# Patient Record
Sex: FEMALE | Race: WHITE | NOT HISPANIC OR LATINO | Employment: OTHER | URBAN - NONMETROPOLITAN AREA
[De-identification: names, ages, dates, MRNs, and addresses within clinical notes are randomized per-mention and may not be internally consistent; named-entity substitution may affect disease eponyms.]

---

## 2018-07-12 ENCOUNTER — OFFICE VISIT (OUTPATIENT)
Dept: CARDIOLOGY | Facility: CLINIC | Age: 73
End: 2018-07-12

## 2018-07-12 VITALS
OXYGEN SATURATION: 98 % | BODY MASS INDEX: 31.73 KG/M2 | DIASTOLIC BLOOD PRESSURE: 80 MMHG | HEART RATE: 86 BPM | WEIGHT: 172.4 LBS | SYSTOLIC BLOOD PRESSURE: 146 MMHG | HEIGHT: 62 IN

## 2018-07-12 DIAGNOSIS — B36.9 FUNGAL RASH OF TORSO: ICD-10-CM

## 2018-07-12 DIAGNOSIS — E78.2 MIXED HYPERLIPIDEMIA: ICD-10-CM

## 2018-07-12 DIAGNOSIS — R07.2 PRECORDIAL PAIN: ICD-10-CM

## 2018-07-12 DIAGNOSIS — R06.02 SHORTNESS OF BREATH: ICD-10-CM

## 2018-07-12 DIAGNOSIS — I10 ESSENTIAL HYPERTENSION: ICD-10-CM

## 2018-07-12 DIAGNOSIS — I25.119 CORONARY ARTERY DISEASE INVOLVING NATIVE CORONARY ARTERY OF NATIVE HEART WITH ANGINA PECTORIS (HCC): Primary | ICD-10-CM

## 2018-07-12 PROCEDURE — 93000 ELECTROCARDIOGRAM COMPLETE: CPT | Performed by: INTERNAL MEDICINE

## 2018-07-12 PROCEDURE — 99204 OFFICE O/P NEW MOD 45 MIN: CPT | Performed by: INTERNAL MEDICINE

## 2018-07-12 RX ORDER — ROSUVASTATIN CALCIUM 5 MG/1
5 TABLET, COATED ORAL EVERY OTHER DAY
COMMUNITY
End: 2018-07-12 | Stop reason: DRUGHIGH

## 2018-07-12 RX ORDER — AZITHROMYCIN 250 MG/1
TABLET, FILM COATED ORAL
Qty: 6 TABLET | Refills: 0 | Status: SHIPPED | OUTPATIENT
Start: 2018-07-12 | End: 2018-08-09

## 2018-07-12 RX ORDER — ROSUVASTATIN CALCIUM 20 MG/1
20 TABLET, COATED ORAL DAILY
Qty: 30 TABLET | Refills: 11 | Status: SHIPPED | OUTPATIENT
Start: 2018-07-12 | End: 2020-01-02 | Stop reason: SDUPTHER

## 2018-07-12 RX ORDER — AMLODIPINE BESYLATE 5 MG/1
5 TABLET ORAL DAILY
Qty: 30 TABLET | Refills: 11 | Status: SHIPPED | OUTPATIENT
Start: 2018-07-12 | End: 2019-05-21 | Stop reason: SDUPTHER

## 2018-07-12 RX ORDER — NYSTATIN 100000 [USP'U]/G
POWDER TOPICAL 4 TIMES DAILY
Qty: 1 EACH | Refills: 6 | Status: SHIPPED | OUTPATIENT
Start: 2018-07-12 | End: 2018-08-09

## 2018-07-12 RX ORDER — NITROGLYCERIN 0.4 MG/1
0.4 TABLET SUBLINGUAL
COMMUNITY
End: 2018-07-12 | Stop reason: SDUPTHER

## 2018-07-12 RX ORDER — LISINOPRIL 20 MG/1
20 TABLET ORAL DAILY
COMMUNITY
End: 2018-10-19 | Stop reason: SDUPTHER

## 2018-07-12 RX ORDER — NITROGLYCERIN 0.4 MG/1
0.4 TABLET SUBLINGUAL
Qty: 25 TABLET | Refills: 6 | Status: SHIPPED | OUTPATIENT
Start: 2018-07-12

## 2018-07-12 RX ORDER — DIMENHYDRINATE 50 MG
1 TABLET ORAL 2 TIMES DAILY
Qty: 60 CAPSULE | Refills: 0 | Status: SHIPPED | OUTPATIENT
Start: 2018-07-12 | End: 2021-01-08 | Stop reason: SDUPTHER

## 2018-07-12 RX ORDER — CLOPIDOGREL BISULFATE 75 MG/1
75 TABLET ORAL DAILY
COMMUNITY
Start: 2014-01-21 | End: 2019-02-25 | Stop reason: SDUPTHER

## 2018-07-12 NOTE — PATIENT INSTRUCTIONS
Steps to Quit Smoking  Smoking tobacco can be bad for your health. It can also affect almost every organ in your body. Smoking puts you and people around you at risk for many serious long-lasting (chronic) diseases. Quitting smoking is hard, but it is one of the best things that you can do for your health. It is never too late to quit.  What are the benefits of quitting smoking?  When you quit smoking, you lower your risk for getting serious diseases and conditions. They can include:  · Lung cancer or lung disease.  · Heart disease.  · Stroke.  · Heart attack.  · Not being able to have children (infertility).  · Weak bones (osteoporosis) and broken bones (fractures).    If you have coughing, wheezing, and shortness of breath, those symptoms may get better when you quit. You may also get sick less often. If you are pregnant, quitting smoking can help to lower your chances of having a baby of low birth weight.  What can I do to help me quit smoking?  Talk with your doctor about what can help you quit smoking. Some things you can do (strategies) include:  · Quitting smoking totally, instead of slowly cutting back how much you smoke over a period of time.  · Going to in-person counseling. You are more likely to quit if you go to many counseling sessions.  · Using resources and support systems, such as:  ? Online chats with a counselor.  ? Phone quitlines.  ? Printed self-help materials.  ? Support groups or group counseling.  ? Text messaging programs.  ? Mobile phone apps or applications.  · Taking medicines. Some of these medicines may have nicotine in them. If you are pregnant or breastfeeding, do not take any medicines to quit smoking unless your doctor says it is okay. Talk with your doctor about counseling or other things that can help you.    Talk with your doctor about using more than one strategy at the same time, such as taking medicines while you are also going to in-person counseling. This can help make  quitting easier.  What things can I do to make it easier to quit?  Quitting smoking might feel very hard at first, but there is a lot that you can do to make it easier. Take these steps:  · Talk to your family and friends. Ask them to support and encourage you.  · Call phone quitlines, reach out to support groups, or work with a counselor.  · Ask people who smoke to not smoke around you.  · Avoid places that make you want (trigger) to smoke, such as:  ? Bars.  ? Parties.  ? Smoke-break areas at work.  · Spend time with people who do not smoke.  · Lower the stress in your life. Stress can make you want to smoke. Try these things to help your stress:  ? Getting regular exercise.  ? Deep-breathing exercises.  ? Yoga.  ? Meditating.  ? Doing a body scan. To do this, close your eyes, focus on one area of your body at a time from head to toe, and notice which parts of your body are tense. Try to relax the muscles in those areas.  · Download or buy apps on your mobile phone or tablet that can help you stick to your quit plan. There are many free apps, such as QuitGuide from the CDC (Centers for Disease Control and Prevention). You can find more support from smokefree.gov and other websites.    This information is not intended to replace advice given to you by your health care provider. Make sure you discuss any questions you have with your health care provider.  Document Released: 10/14/2010 Document Revised: 08/15/2017 Document Reviewed: 05/03/2016  [employer] Interactive Patient Education © 2018 [employer]  Fat and Cholesterol Restricted Diet  Getting too much fat and cholesterol in your diet may cause health problems. Following this diet helps keep your fat and cholesterol at normal levels. This can keep you from getting sick.  What types of fat should I choose?  · Choose monosaturated and polyunsaturated fats. These are found in foods such as olive oil, canola oil, flaxseeds, walnuts, almonds, and seeds.  · Eat more  "omega-3 fats. Good choices include salmon, mackerel, sardines, tuna, flaxseed oil, and ground flaxseeds.  · Limit saturated fats. These are in animal products such as meats, butter, and cream. They can also be in plant products such as palm oil, palm kernel oil, and coconut oil.  · Avoid foods with partially hydrogenated oils in them. These contain trans fats. Examples of foods that have trans fats are stick margarine, some tub margarines, cookies, crackers, and other baked goods.  What general guidelines do I need to follow?  · Check food labels. Look for the words \"trans fat\" and \"saturated fat.\"  · When preparing a meal:  ? Fill half of your plate with vegetables and green salads.  ? Fill one fourth of your plate with whole grains. Look for the word \"whole\" as the first word in the ingredient list.  ? Fill one fourth of your plate with lean protein foods.  · Eat more foods that have fiber, like apples, carrots, beans, peas, and barley.  · Eat more home-cooked foods. Eat less at restaurants and buffets.  · Limit or avoid alcohol.  · Limit foods high in starch and sugar.  · Limit fried foods.  · Cook foods without frying them. Baking, boiling, grilling, and broiling are all great options.  · Lose weight if you are overweight. Losing even a small amount of weight can help your overall health. It can also help prevent diseases such as diabetes and heart disease.  What foods can I eat?  Grains  Whole grains, such as whole wheat or whole grain breads, crackers, cereals, and pasta. Unsweetened oatmeal, bulgur, barley, quinoa, or brown rice. Corn or whole wheat flour tortillas.  Vegetables  Fresh or frozen vegetables (raw, steamed, roasted, or grilled). Green salads.  Fruits  All fresh, canned (in natural juice), or frozen fruits.  Meat and Other Protein Products  Ground beef (85% or leaner), grass-fed beef, or beef trimmed of fat. Skinless chicken or turkey. Ground chicken or turkey. Pork trimmed of fat. All fish and " seafood. Eggs. Dried beans, peas, or lentils. Unsalted nuts or seeds. Unsalted canned or dry beans.  Dairy  Low-fat dairy products, such as skim or 1% milk, 2% or reduced-fat cheeses, low-fat ricotta or cottage cheese, or plain low-fat yogurt.  Fats and Oils  Tub margarines without trans fats. Light or reduced-fat mayonnaise and salad dressings. Avocado. Olive, canola, sesame, or safflower oils. Natural peanut or almond butter (choose ones without added sugar and oil).  The items listed above may not be a complete list of recommended foods or beverages. Contact your dietitian for more options.  What foods are not recommended?  Grains  White bread. White pasta. White rice. Cornbread. Bagels, pastries, and croissants. Crackers that contain trans fat.  Vegetables  White potatoes. Corn. Creamed or fried vegetables. Vegetables in a cheese sauce.  Fruits  Dried fruits. Canned fruit in light or heavy syrup. Fruit juice.  Meat and Other Protein Products  Fatty cuts of meat. Ribs, chicken wings, hartmann, sausage, bologna, salami, chitterlings, fatback, hot dogs, bratwurst, and packaged luncheon meats. Liver and organ meats.  Dairy  Whole or 2% milk, cream, half-and-half, and cream cheese. Whole milk cheeses. Whole-fat or sweetened yogurt. Full-fat cheeses. Nondairy creamers and whipped toppings. Processed cheese, cheese spreads, or cheese curds.  Sweets and Desserts  Corn syrup, sugars, honey, and molasses. Candy. Jam and jelly. Syrup. Sweetened cereals. Cookies, pies, cakes, donuts, muffins, and ice cream.  Fats and Oils  Butter, stick margarine, lard, shortening, ghee, or hartmann fat. Coconut, palm kernel, or palm oils.  Beverages  Alcohol. Sweetened drinks (such as sodas, lemonade, and fruit drinks or punches).  The items listed above may not be a complete list of foods and beverages to avoid. Contact your dietitian for more information.  This information is not intended to replace advice given to you by your health care  provider. Make sure you discuss any questions you have with your health care provider.  Document Released: 06/18/2013 Document Revised: 08/24/2017 Document Reviewed: 03/19/2015  [employer] Interactive Patient Education © 2018 [employer]  BMI for Adults  Body mass index (BMI) is a number that is calculated from a person's weight and height. In most adults, the number is used to find how much of an adult's weight is made up of fat. BMI is not as accurate as a direct measure of body fat.  How is BMI calculated?  BMI is calculated by dividing weight in kilograms by height in meters squared. It can also be calculated by dividing weight in pounds by height in inches squared, then multiplying the resulting number by 703. Charts are available to help you find your BMI quickly and easily without doing this calculation.  How is BMI interpreted?  Health care professionals use BMI charts to identify whether an adult is underweight, at a normal weight, or overweight based on the following guidelines:  · Underweight: BMI less than 18.5.  · Normal weight: BMI between 18.5 and 24.9.  · Overweight: BMI between 25 and 29.9.  · Obese: BMI of 30 and above.    BMI is usually interpreted the same for males and females.  Weight includes both fat and muscle, so someone with a muscular build, such as an athlete, may have a BMI that is higher than 24.9. In cases like these, BMI may not accurately depict body fat. To determine if excess body fat is the cause of a BMI of 25 or higher, further assessments may need to be done by a health care provider.  Why is BMI a useful tool?  BMI is used to identify a possible weight problem that may be related to a medical problem or may increase the risk for medical problems. BMI can also be used to promote changes to reach a healthy weight.  This information is not intended to replace advice given to you by your health care provider. Make sure you discuss any questions you have with your health care  provider.  Document Released: 08/29/2005 Document Revised: 04/27/2017 Document Reviewed: 05/15/2015  [employer] Interactive Patient Education © 2018 [employer]      Tonic Water 6 ounces daily

## 2018-07-12 NOTE — PROGRESS NOTES
"Tori Olvera is a 72 y.o. female     Chief Complaint   Patient presents with   • Establish Care     Presents to establish cardiac care.    • Coronary Artery Disease   • Shortness of Breath   • Hyperlipidemia   • Chest Pain       PROBLEM LIST:     Specialty Problems     None          1. CAD.  1.1 Cardiac Cath. \"Failed\" PCI of the RCA. [facility]. Winter 2014- Intraprocedural closure of vessel. ILUMIEN-1 stent mid-LAD, 01/20/2014. [place], [place].  2. Chest Pain.  3. Shortness of Breath.  4. Hyperlipidemia.     HPI:    Ms. Cassy Olvera is a 72-year-old white female referred by Dr. Joseph for evaluation of recurrent chest pain and progressive dyspnea.    Ms. Jackson has known atherosclerotic coronary artery disease and underwent stenting of the right coronary artery and a proximally 2014.  Apparently she had moderate disease in the LAD at that time which was not stented after FFR evaluation.    Since that time the patient has had mildly progressive exertional dyspnea until approximately 2-3 months ago when she began to notice recurrent chest discomfort.  She describes a deep retrosternal and left precordial pain radiates toward the back which occurs when she walks less than 100 yards.  She has a sense of increased shortness of breath when she has discomfort.  She never has rest chest pain, and her discomfort is not pleuritic.  It is not been progressive since that time.  However, Ms. Olvera is severely limited by exertional dyspnea.  Class III levels of activity produces clinically significant shortness of breath.  She has no orthopnea or PND.  She has severe nocturnal leg cramps but no claudication.  She describes no palpitations, dizziness, presyncope or syncope.  She describes no symptoms of arterial embolic events in particular, she has no symptoms of TIA or stroke.    CURRENT MEDICATION:    Current Outpatient Prescriptions   Medication Sig Dispense Refill   • clopidogrel (PLAVIX) 75 MG " tablet Take  by mouth Daily.     • lisinopril (PRINIVIL,ZESTRIL) 20 MG tablet Take 20 mg by mouth Daily.     • nitroglycerin (NITROSTAT) 0.4 MG SL tablet Place 1 tablet under the tongue Every 5 (Five) Minutes As Needed for Chest Pain. Take no more than 3 doses in 15 minutes. 25 tablet 6   • aclidinium bromide (TUDORZA PRESSAIR) 400 MCG/ACT aerosol powder  powder for inhalation Inhale 1 puff 2 (Two) Times a Day. 1 each 11   • amLODIPine (NORVASC) 5 MG tablet Take 1 tablet by mouth Daily. 30 tablet 11   • azithromycin (ZITHROMAX Z-LIZZIE) 250 MG tablet Take 2 tablets the first day, then 1 tablet daily for 4 days. 6 tablet 0   • Coenzyme Q10 (CO Q-10) 100 MG capsule Take 100 mg by mouth 2 (Two) Times a Day. 60 capsule 0   • Fluticasone Furoate-Vilanterol (BREO ELLIPTA) 100-25 MCG/INH aerosol powder  Inhale 1 puff Daily. 1 each 11   • nystatin (MYCOSTATIN) 494561 UNIT/GM powder Apply  topically 4 (Four) Times a Day. 1 each 6   • rosuvastatin (CRESTOR) 20 MG tablet Take 1 tablet by mouth Daily. 30 tablet 11     No current facility-administered medications for this visit.        ALLERGIES:    Patient has no known allergies.    PAST MEDICAL HISTORY:    Past Medical History:   Diagnosis Date   • Coronary artery disease    • Hyperlipidemia    • Hypertension        SURGICAL HISTORY:    Past Surgical History:   Procedure Laterality Date   • CARDIAC CATHETERIZATION     • COLONOSCOPY W/ BIOPSIES     • CORONARY STENT PLACEMENT  01/20/2014    Stent x1 01/20/14   • CORONARY STENT PLACEMENT  2014    Failed PCI to RCA       SOCIAL HISTORY:    Social History     Social History   • Marital status:      Spouse name: N/A   • Number of children: N/A   • Years of education: N/A     Occupational History   • Not on file.     Social History Main Topics   • Smoking status: Current Every Day Smoker     Packs/day: 1.50     Years: 50.00     Types: Cigarettes   • Smokeless tobacco: Never Used   • Alcohol use No   • Drug use: No   • Sexual  "activity: Defer     Other Topics Concern   • Not on file     Social History Narrative   • No narrative on file       FAMILY HISTORY:    Family History   Problem Relation Age of Onset   • Hypertension Mother    • Heart disease Mother    • Diabetes Mother    • Heart attack Father        Review of Systems   Constitutional: Positive for fatigue. Negative for chills, diaphoresis and fever.   HENT: Positive for sore throat.    Eyes: Positive for visual disturbance (Wears reading glasses).   Respiratory: Positive for shortness of breath (With minimal exertion).    Cardiovascular: Positive for chest pain (Intermittent with activity. \"pressure\". Midsternal, non-radiating. ). Negative for palpitations and leg swelling.   Gastrointestinal: Positive for constipation (Chronic). Negative for abdominal pain, blood in stool and diarrhea.   Endocrine: Negative.    Genitourinary: Negative.  Negative for difficulty urinating and hematuria.   Musculoskeletal: Positive for myalgias (To BLE). Negative for arthralgias.   Skin: Negative.    Allergic/Immunologic: Negative.    Neurological: Negative for dizziness, syncope, weakness, light-headedness and headaches.   Hematological: Bruises/bleeds easily (Both).   Psychiatric/Behavioral: Negative for sleep disturbance (Denies waking at night SOA. ).       VISIT VITALS:  Vitals:    07/12/18 1052   BP: 146/80   BP Location: Left arm   Patient Position: Sitting   Pulse: 86   SpO2: 98%   Weight: 78.2 kg (172 lb 6.4 oz)   Height: 157.5 cm (62\")      /80 (BP Location: Left arm, Patient Position: Sitting)   Pulse 86   Ht 157.5 cm (62\")   Wt 78.2 kg (172 lb 6.4 oz)   SpO2 98%   BMI 31.53 kg/m²     RECENT LABS:    Objective       Physical Exam   Constitutional: She is oriented to person, place, and time. She appears well-developed and well-nourished. She is cooperative.   HENT:   Head: Normocephalic and atraumatic.   Mouth/Throat: Uvula is midline and mucous membranes are normal. No oral " lesions.   Tonsillar and Palate redness noted   Eyes: Conjunctivae, EOM and lids are normal. Pupils are equal, round, and reactive to light.   Negative Lid Lag  Negative Ptosis   Neck: Normal range of motion and full passive range of motion without pain. Neck supple. Normal carotid pulses, no hepatojugular reflux and no JVD present. Carotid bruit is not present. No thyromegaly present.   Cardiovascular: Normal rate and regular rhythm.  Exam reveals gallop and S4 (Soft). Exam reveals no S3 and no friction rub.    No murmur heard.  Pulses:       Carotid pulses are 2+ on the right side, and 2+ on the left side.       Radial pulses are 2+ on the right side, and 2+ on the left side.        Dorsalis pedis pulses are 2+ on the left side. Right dorsalis pedis pulse not accessible.   Distant S1 and S2   Pulmonary/Chest: Effort normal. No respiratory distress. She has decreased breath sounds (Mildly decreased breath sounds throughout). She has wheezes in the left upper field. She has no rhonchi. She has no rales.   Abdominal: Soft. Normal appearance and bowel sounds are normal. She exhibits no distension, no abdominal bruit and no mass. There is no hepatomegaly. There is no tenderness. There is no rebound and no guarding.   Musculoskeletal: Normal range of motion. She exhibits edema (Trace edema to BLE. ).   Neurological: She is alert and oriented to person, place, and time.   Skin: Skin is warm, dry and intact. Rash (Fungal rash noted to skin fold of abdomen) noted.   Cap refill >6 seconds to RLE   Psychiatric: She has a normal mood and affect. Her behavior is normal. Judgment and thought content normal.   Nursing note and vitals reviewed.        ECG 12 Lead  Date/Time: 7/12/2018 11:09 AM  Performed by: DORYS SHAFFER  Authorized by: DORYS SHAFFER   Comments: Indications: CP. SOA. CAD.               Assessment/Plan      #1.  Recurrent low level angina in a patient with known coronary artery disease.  Risk  stratification needs to be pursued on an expedited basis.    #2.  Therefore, we will perform pharmacologic stress testing as an ischemia assessment, and echocardiography to assess LV systolic and diastolic performance, LV filling pressures, and pulmonary pressures given the patient's profound dyspnea.    #3.  As empiric therapy for ischemia we will start amlodipine 5 mg a day and avoid beta blocker at least temporarily because the patient's chronic lung disease.  We we'll also give the patient prescription for sublingual nitroglycerin and she was given instructions in its use.    #4.  The patient would benefit from high-dose statin therapy.  Therefore, we will increase rosuvastatin to 20 mg daily and start the patient on coenzyme Q10 100 mg twice daily and tonic water 6 ounces to mitigate arthralgias and myalgias.    #5.  Ms. Olvera describes purulent sputum which did not improve with a penicillin derivative prescribed by Dr. Joseph.  As she has marked pharyngeal injection today we will empirically start a Z-Kaiden and Ms. Olvera will follow-up with Dr. Joseph she does not have improvement in symptoms in several days.    #6.  I would like to see how much of the patient's exertional dyspnea is related to chronic lung disease.  Therefore we will start 2 doors and debris oh inhalers and the patient will continue to use her rescue albuterol.    #7.  Ms. Olvera will follow-up with Dr. Joseph as instructed, and in our office after testing, on a when necessary basis for symptoms, rest discussed for medication intolerance.  The patient understands to report to the emergency room are activated emergency medical services for any chest pain not rapidly relieved by rest and/or nitroglycerin.     Diagnosis Plan   1. Coronary artery disease involving native coronary artery of native heart with angina pectoris (CMS/[facility])  ECG 12 Lead    Adult Transthoracic Echo Complete W/ Cont if Necessary Per Protocol    Stress Test With  Myocardial Perfusion One Day    rosuvastatin (CRESTOR) 20 MG tablet    amLODIPine (NORVASC) 5 MG tablet    nitroglycerin (NITROSTAT) 0.4 MG SL tablet    Adult Transthoracic Echo Complete W/ Cont if Necessary Per Protocol    Stress Test With Myocardial Perfusion One Day   2. Precordial pain  ECG 12 Lead    Adult Transthoracic Echo Complete W/ Cont if Necessary Per Protocol    Stress Test With Myocardial Perfusion One Day    rosuvastatin (CRESTOR) 20 MG tablet    amLODIPine (NORVASC) 5 MG tablet    nitroglycerin (NITROSTAT) 0.4 MG SL tablet    Adult Transthoracic Echo Complete W/ Cont if Necessary Per Protocol    Stress Test With Myocardial Perfusion One Day   3. Shortness of breath  ECG 12 Lead    Adult Transthoracic Echo Complete W/ Cont if Necessary Per Protocol    Stress Test With Myocardial Perfusion One Day    rosuvastatin (CRESTOR) 20 MG tablet    amLODIPine (NORVASC) 5 MG tablet    aclidinium bromide (TUDORZA PRESSAIR) 400 MCG/ACT aerosol powder  powder for inhalation    Fluticasone Furoate-Vilanterol (BREO ELLIPTA) 100-25 MCG/INH aerosol powder     azithromycin (ZITHROMAX Z-LIZZIE) 250 MG tablet    nitroglycerin (NITROSTAT) 0.4 MG SL tablet    Adult Transthoracic Echo Complete W/ Cont if Necessary Per Protocol    Stress Test With Myocardial Perfusion One Day   4. Mixed hyperlipidemia  Adult Transthoracic Echo Complete W/ Cont if Necessary Per Protocol    Stress Test With Myocardial Perfusion One Day    rosuvastatin (CRESTOR) 20 MG tablet    amLODIPine (NORVASC) 5 MG tablet    Coenzyme Q10 (CO Q-10) 100 MG capsule    Adult Transthoracic Echo Complete W/ Cont if Necessary Per Protocol    Stress Test With Myocardial Perfusion One Day   5. Essential hypertension  amLODIPine (NORVASC) 5 MG tablet   6. Fungal rash of torso  nystatin (MYCOSTATIN) 438360 UNIT/GM powder       Return in about 6 weeks (around 8/23/2018).         I advised Cassy of the risks of continuing to use tobacco, and I provided her with tobacco  cessation educational materials in the After Visit Summary.     During this visit, I spent less than 3 minutes counseling the patient regarding tobacco cessation.      Shiraz Byrne MD        Electronically signed by:        Scribed for Dr. Shiraz Byrne by Pita Fortune LPN July 17, 2018 9:18 AM     This note is dictated utilizing voice recognition software.  Although this record has been proof read, transcriptional errors may still be present. If questions occur regarding the content of this record please do not hesitate to call our office.

## 2018-07-17 ENCOUNTER — DOCUMENTATION (OUTPATIENT)
Dept: CARDIOLOGY | Facility: CLINIC | Age: 73
End: 2018-07-17

## 2018-08-07 ENCOUNTER — HOSPITAL ENCOUNTER (OUTPATIENT)
Dept: CARDIOLOGY | Facility: HOSPITAL | Age: 73
Discharge: HOME | End: 2018-08-07
Attending: INTERNAL MEDICINE

## 2018-08-07 ENCOUNTER — OUTSIDE FACILITY SERVICE (OUTPATIENT)
Dept: CARDIOLOGY | Facility: CLINIC | Age: 73
End: 2018-08-07

## 2018-08-07 LAB
MAXIMAL PREDICTED HEART RATE: 148 BPM
MAXIMAL PREDICTED HEART RATE: 148 BPM
STRESS TARGET HR: 126 BPM
STRESS TARGET HR: 126 BPM

## 2018-08-07 PROCEDURE — 93306 TTE W/DOPPLER COMPLETE: CPT | Performed by: INTERNAL MEDICINE

## 2018-08-07 PROCEDURE — 93306 TTE W/DOPPLER COMPLETE: CPT

## 2018-08-07 PROCEDURE — 78452 HT MUSCLE IMAGE SPECT MULT: CPT | Performed by: INTERNAL MEDICINE

## 2018-08-07 PROCEDURE — 93018 CV STRESS TEST I&R ONLY: CPT | Performed by: INTERNAL MEDICINE

## 2018-08-07 PROCEDURE — 25010000002 REGADENOSON 0.4 MG/5ML SOLUTION: Performed by: INTERNAL MEDICINE

## 2018-08-07 PROCEDURE — 93017 CV STRESS TEST TRACING ONLY: CPT

## 2018-08-07 PROCEDURE — A9500 TC99M SESTAMIBI: HCPCS | Performed by: INTERNAL MEDICINE

## 2018-08-07 PROCEDURE — 78452 HT MUSCLE IMAGE SPECT MULT: CPT

## 2018-08-07 PROCEDURE — 0 TECHNETIUM SESTAMIBI: Performed by: INTERNAL MEDICINE

## 2018-08-07 RX ADMIN — TECHNETIUM TC 99M SESTAMIBI 1 DOSE: 1 INJECTION INTRAVENOUS at 08:25

## 2018-08-07 RX ADMIN — TECHNETIUM TC 99M SESTAMIBI 1 DOSE: 1 INJECTION INTRAVENOUS at 08:24

## 2018-08-07 RX ADMIN — REGADENOSON 0.4 MG: 0.08 INJECTION, SOLUTION INTRAVENOUS at 08:24

## 2018-08-09 ENCOUNTER — OFFICE VISIT (OUTPATIENT)
Dept: CARDIOLOGY | Facility: CLINIC | Age: 73
End: 2018-08-09

## 2018-08-09 VITALS
SYSTOLIC BLOOD PRESSURE: 152 MMHG | HEART RATE: 73 BPM | OXYGEN SATURATION: 95 % | HEIGHT: 62 IN | DIASTOLIC BLOOD PRESSURE: 70 MMHG | BODY MASS INDEX: 32.02 KG/M2 | WEIGHT: 174 LBS

## 2018-08-09 DIAGNOSIS — R06.02 SHORTNESS OF BREATH: ICD-10-CM

## 2018-08-09 DIAGNOSIS — R07.2 PRECORDIAL PAIN: ICD-10-CM

## 2018-08-09 DIAGNOSIS — I25.119 CORONARY ARTERY DISEASE INVOLVING NATIVE CORONARY ARTERY OF NATIVE HEART WITH ANGINA PECTORIS (HCC): Primary | ICD-10-CM

## 2018-08-09 DIAGNOSIS — E78.2 MIXED HYPERLIPIDEMIA: ICD-10-CM

## 2018-08-09 DIAGNOSIS — R94.39 ABNORMAL STRESS TEST: ICD-10-CM

## 2018-08-09 DIAGNOSIS — I10 ESSENTIAL HYPERTENSION: ICD-10-CM

## 2018-08-09 PROCEDURE — 99213 OFFICE O/P EST LOW 20 MIN: CPT | Performed by: INTERNAL MEDICINE

## 2018-08-09 NOTE — PROGRESS NOTES
"Subjective   Cassy SARA Olvera is a 72 y.o. female     Chief Complaint   Patient presents with   • Coronary Artery Disease     Here for f/u on testing   • Hyperlipidemia   • Hypertension       PROBLEM LIST:       1. CAD.  1.1 Cardiac Cath. \"Failed\" PCI of the RCA. [facility]. Winter 2014- Intraprocedural closure of vessel. ILUMIEN-1 stent mid-LAD, 01/20/2014. [place], [place].  2. Chest Pain.  2.1 Nuclear stress test 8-7-18 with lateral wall ischemia  2.2 Echo 8-7-18 with Ef > 65%, mind DD, trace MR, trace TR, pulm. Pressures 30-35 mmHg, no pericardial effusion  3. Shortness of Breath.  4. Hyperlipidemia.          Specialty Problems        Cardiology Problems    Coronary artery disease involving native coronary artery of native heart with angina pectoris (CMS/HCC)        Mixed hyperlipidemia                HPI:  Ms. Olvera returns for follow-up on test results and to assess response to therapy.    She has had several episodes of less of beer chest pain and that with her initial presentation but of a similar nature.  She continues to be without heart failure dysrhythmic symptoms.    Echo demonstrated preserved LV systolic function, mild diastolic dysfunction, high normal to mildly elevated pulmonary pressures, and no significant valve, pericardial, or great vessel pathology.    Stress test demonstrated a relatively large and dense lateral wall ischemic defect with preserved LV systolic function.          CURRENT MEDICATION:    Current Outpatient Prescriptions   Medication Sig Dispense Refill   • amLODIPine (NORVASC) 5 MG tablet Take 1 tablet by mouth Daily. 30 tablet 11   • clopidogrel (PLAVIX) 75 MG tablet Take  by mouth Daily.     • Coenzyme Q10 (CO Q-10) 100 MG capsule Take 100 mg by mouth 2 (Two) Times a Day. 60 capsule 0   • lisinopril (PRINIVIL,ZESTRIL) 20 MG tablet Take 20 mg by mouth Daily.     • rosuvastatin (CRESTOR) 20 MG tablet Take 1 tablet by mouth Daily. 30 tablet 11   • nitroglycerin (NITROSTAT) " 0.4 MG SL tablet Place 1 tablet under the tongue Every 5 (Five) Minutes As Needed for Chest Pain. Take no more than 3 doses in 15 minutes. 25 tablet 6     No current facility-administered medications for this visit.        ALLERGIES:    Patient has no known allergies.    PAST MEDICAL HISTORY:    Past Medical History:   Diagnosis Date   • Coronary artery disease    • Hyperlipidemia    • Hypertension        SURGICAL HISTORY:    Past Surgical History:   Procedure Laterality Date   • CARDIAC CATHETERIZATION     • COLONOSCOPY W/ BIOPSIES     • CORONARY STENT PLACEMENT  01/20/2014    Stent x1 01/20/14   • CORONARY STENT PLACEMENT  2014    Failed PCI to RCA       SOCIAL HISTORY:    Social History     Social History   • Marital status:      Spouse name: N/A   • Number of children: N/A   • Years of education: N/A     Occupational History   • Not on file.     Social History Main Topics   • Smoking status: Current Every Day Smoker     Packs/day: 1.50     Years: 50.00     Types: Cigarettes   • Smokeless tobacco: Never Used   • Alcohol use No   • Drug use: No   • Sexual activity: Defer     Other Topics Concern   • Not on file     Social History Narrative   • No narrative on file       FAMILY HISTORY:    Family History   Problem Relation Age of Onset   • Hypertension Mother    • Heart disease Mother    • Diabetes Mother    • Heart attack Father        Review of Systems   Constitutional: Positive for fatigue.   HENT: Negative.    Eyes: Positive for visual disturbance (glasses prn).   Respiratory: Positive for cough and shortness of breath.    Cardiovascular: Negative.    Gastrointestinal: Negative.  Negative for blood in stool (no melena, hematochezia,hematuria,hemoptysis).   Endocrine: Negative.    Genitourinary: Negative.    Musculoskeletal: Negative.    Skin: Negative.    Allergic/Immunologic: Negative.    Neurological: Positive for dizziness (occa.s with getting up).   Hematological: Bruises/bleeds easily (bruise).  "  Psychiatric/Behavioral: Negative.        VISIT VITALS:  Vitals:    08/09/18 1049   BP: 152/70   BP Location: Left arm   Patient Position: Sitting   Pulse: 73   SpO2: 95%   Weight: 78.9 kg (174 lb)   Height: 157.5 cm (62.01\")      /70 (BP Location: Left arm, Patient Position: Sitting)   Pulse 73   Ht 157.5 cm (62.01\")   Wt 78.9 kg (174 lb)   SpO2 95%   BMI 31.82 kg/m²     RECENT LABS:    Objective       Physical Exam    Procedures      Assessment/Plan   #1.  Persistent angina despite guideline directed therapy in a patient with known coronary artery disease, previously stented right coronary artery, moderate residual disease in the LAD at the time of cardiac catheterization, and now with a new lateral wall ischemic defect.  I feel that invasive assessment was is indicated for both diagnostic and prognostic as well as, potentially, therapeutic purposes.    #2.  In anticipation of cardiac catheterization we will add aspirin to the patient's current regimen.    #3.  Other medications will be continued.  The patient was given appropriate precautions about persistent chest pain unrelieved by nitroglycerin.  She understands to activate emergency medical services or report to the emergency room in that case.  Elvia #4.  Ms. Olvera will follow-up with Dr. naveed luu as instructed, with other recommendations from us pending findings at the time of cardiac catheterization.   Diagnosis Plan   1. Coronary artery disease involving native coronary artery of native heart with angina pectoris (CMS/HCC)     2. Mixed hyperlipidemia     3. Shortness of breath     4. Precordial pain     5. Abnormal stress test     6. Essential hypertension         No Follow-up on file.         I advised Cassy of the risks of continuing to use tobacco, and I provided her with tobacco cessation educational materials in the After Visit Summary.     During this visit, I spent <3 minutes counseling the patient regarding tobacco " cessation.     Patient's Body mass index is 31.82 kg/m². BMI is above normal parameters. Recommendations include: educational material and referral to primary care.       Maite Calle LPN    Scribed for Dr. Shiraz Byrne by Maite Calle LPN August 9, 2018 11:41 AM       Electronically signed by:            This note is dictated utilizing voice recognition software.  Although this record has been proof read, transcriptional errors may still be present. If questions occur regarding the content of this record please do not hesitate to call our office.

## 2018-08-14 ENCOUNTER — LAB (OUTPATIENT)
Dept: LAB | Facility: HOSPITAL | Age: 73
End: 2018-08-14
Attending: INTERNAL MEDICINE

## 2018-08-14 DIAGNOSIS — I25.119 CORONARY ARTERY DISEASE INVOLVING NATIVE CORONARY ARTERY OF NATIVE HEART WITH ANGINA PECTORIS (HCC): ICD-10-CM

## 2018-08-14 DIAGNOSIS — E78.2 MIXED HYPERLIPIDEMIA: ICD-10-CM

## 2018-08-14 DIAGNOSIS — I10 ESSENTIAL HYPERTENSION: ICD-10-CM

## 2018-08-14 DIAGNOSIS — R07.2 PRECORDIAL PAIN: ICD-10-CM

## 2018-08-14 DIAGNOSIS — R06.02 SHORTNESS OF BREATH: ICD-10-CM

## 2018-08-14 PROCEDURE — 80048 BASIC METABOLIC PNL TOTAL CA: CPT | Performed by: INTERNAL MEDICINE

## 2018-08-14 PROCEDURE — 36415 COLL VENOUS BLD VENIPUNCTURE: CPT

## 2018-08-15 LAB
ANION GAP SERPL CALCULATED.3IONS-SCNC: 8.9 MMOL/L (ref 3.6–11.2)
BUN BLD-MCNC: 7 MG/DL (ref 7–21)
BUN/CREAT SERPL: 9.1 (ref 7–25)
CALCIUM SPEC-SCNC: 9.7 MG/DL (ref 7.7–10)
CHLORIDE SERPL-SCNC: 100 MMOL/L (ref 99–112)
CO2 SERPL-SCNC: 30.1 MMOL/L (ref 24.3–31.9)
CREAT BLD-MCNC: 0.77 MG/DL (ref 0.43–1.29)
GFR SERPL CREATININE-BSD FRML MDRD: 74 ML/MIN/1.73
GLUCOSE BLD-MCNC: 101 MG/DL (ref 70–110)
OSMOLALITY SERPL CALC.SUM OF ELEC: 275.7 MOSM/KG (ref 273–305)
POTASSIUM BLD-SCNC: 4.7 MMOL/L (ref 3.5–5.3)
SODIUM BLD-SCNC: 139 MMOL/L (ref 135–153)

## 2018-10-18 ENCOUNTER — TELEPHONE (OUTPATIENT)
Dept: CARDIOLOGY | Facility: CLINIC | Age: 73
End: 2018-10-18

## 2018-10-18 NOTE — TELEPHONE ENCOUNTER
Patient was notified due to her not being seen since August we would need to see her in office and re-evaluate her s/s. NICKOLAS Sr scheduled patient for 10/19/2018 @ 0900 AM and patient was notified of date and time. -LATRICIA;KATIA

## 2018-10-18 NOTE — TELEPHONE ENCOUNTER
----- Message from Barak Campoverde Rep sent at 10/17/2018 12:17 PM EDT -----  PT WANTS TO R/S HER CATH

## 2018-10-19 ENCOUNTER — OFFICE VISIT (OUTPATIENT)
Dept: CARDIOLOGY | Facility: CLINIC | Age: 73
End: 2018-10-19

## 2018-10-19 VITALS
BODY MASS INDEX: 31.5 KG/M2 | HEART RATE: 84 BPM | HEIGHT: 62 IN | WEIGHT: 171.2 LBS | SYSTOLIC BLOOD PRESSURE: 167 MMHG | OXYGEN SATURATION: 96 % | DIASTOLIC BLOOD PRESSURE: 76 MMHG

## 2018-10-19 DIAGNOSIS — E78.2 MIXED HYPERLIPIDEMIA: ICD-10-CM

## 2018-10-19 DIAGNOSIS — F17.200 SMOKING: ICD-10-CM

## 2018-10-19 DIAGNOSIS — R07.2 PRECORDIAL PAIN: ICD-10-CM

## 2018-10-19 DIAGNOSIS — R94.39 ABNORMAL STRESS TEST: ICD-10-CM

## 2018-10-19 DIAGNOSIS — R06.02 SHORTNESS OF BREATH: ICD-10-CM

## 2018-10-19 DIAGNOSIS — I25.119 CORONARY ARTERY DISEASE INVOLVING NATIVE CORONARY ARTERY OF NATIVE HEART WITH ANGINA PECTORIS (HCC): Primary | ICD-10-CM

## 2018-10-19 DIAGNOSIS — Z00.00 HEALTHCARE MAINTENANCE: ICD-10-CM

## 2018-10-19 DIAGNOSIS — I10 ESSENTIAL HYPERTENSION: ICD-10-CM

## 2018-10-19 PROBLEM — IMO0001 SMOKING: Status: ACTIVE | Noted: 2018-10-19

## 2018-10-19 PROCEDURE — 99213 OFFICE O/P EST LOW 20 MIN: CPT | Performed by: NURSE PRACTITIONER

## 2018-10-19 RX ORDER — LISINOPRIL 40 MG/1
40 TABLET ORAL DAILY
Qty: 30 TABLET | Refills: 11 | Status: SHIPPED | OUTPATIENT
Start: 2018-10-19 | End: 2019-11-17 | Stop reason: SDUPTHER

## 2018-10-19 NOTE — PATIENT INSTRUCTIONS
BMI for Adults  Body mass index (BMI) is a number that is calculated from a person's weight and height. In most adults, the number is used to find how much of an adult's weight is made up of fat. BMI is not as accurate as a direct measure of body fat.  How is BMI calculated?  BMI is calculated by dividing weight in kilograms by height in meters squared. It can also be calculated by dividing weight in pounds by height in inches squared, then multiplying the resulting number by 703. Charts are available to help you find your BMI quickly and easily without doing this calculation.  How is BMI interpreted?  Health care professionals use BMI charts to identify whether an adult is underweight, at a normal weight, or overweight based on the following guidelines:  · Underweight: BMI less than 18.5.  · Normal weight: BMI between 18.5 and 24.9.  · Overweight: BMI between 25 and 29.9.  · Obese: BMI of 30 and above.    BMI is usually interpreted the same for males and females.  Weight includes both fat and muscle, so someone with a muscular build, such as an athlete, may have a BMI that is higher than 24.9. In cases like these, BMI may not accurately depict body fat. To determine if excess body fat is the cause of a BMI of 25 or higher, further assessments may need to be done by a health care provider.  Why is BMI a useful tool?  BMI is used to identify a possible weight problem that may be related to a medical problem or may increase the risk for medical problems. BMI can also be used to promote changes to reach a healthy weight.  This information is not intended to replace advice given to you by your health care provider. Make sure you discuss any questions you have with your health care provider.  Document Released: 08/29/2005 Document Revised: 04/27/2017 Document Reviewed: 05/15/2015  [employer] Interactive Patient Education © 2018 [employer]  For more information:    Quit Now  [place]  1-800-QUIT-NOW  https://[place].quitlogix.org/en-[place]/  Steps to Quit Smoking  Smoking tobacco can be harmful to your health and can affect almost every organ in your body. Smoking puts you, and those around you, at risk for developing many serious chronic diseases. Quitting smoking is difficult, but it is one of the best things that you can do for your health. It is never too late to quit.  What are the benefits of quitting smoking?  When you quit smoking, you lower your risk of developing serious diseases and conditions, such as:  · Lung cancer or lung disease, such as COPD.  · Heart disease.  · Stroke.  · Heart attack.  · Infertility.  · Osteoporosis and bone fractures.  Additionally, symptoms such as coughing, wheezing, and shortness of breath may get better when you quit. You may also find that you get sick less often because your body is stronger at fighting off colds and infections. If you are pregnant, quitting smoking can help to reduce your chances of having a baby of low birth weight.  How do I get ready to quit?  When you decide to quit smoking, create a plan to make sure that you are successful. Before you quit:  · Pick a date to quit. Set a date within the next two weeks to give you time to prepare.  · Write down the reasons why you are quitting. Keep this list in places where you will see it often, such as on your bathroom mirror or in your car or wallet.  · Identify the people, places, things, and activities that make you want to smoke (triggers) and avoid them. Make sure to take these actions:  ¨ Throw away all cigarettes at home, at work, and in your car.  ¨ Throw away smoking accessories, such as ashtrays and lighters.  ¨ Clean your car and make sure to empty the ashtray.  ¨ Clean your home, including curtains and carpets.  · Tell your family, friends, and coworkers that you are quitting. Support from your loved ones can make quitting easier.  · Talk with your health care provider about  your options for quitting smoking.  · Find out what treatment options are covered by your health insurance.  What strategies can I use to quit smoking?  Talk with your healthcare provider about different strategies to quit smoking. Some strategies include:  · Quitting smoking altogether instead of gradually lessening how much you smoke over a period of time. Research shows that quitting “cold turkey” is more successful than gradually quitting.  · Attending in-person counseling to help you build problem-solving skills. You are more likely to have success in quitting if you attend several counseling sessions. Even short sessions of 10 minutes can be effective.  · Finding resources and support systems that can help you to quit smoking and remain smoke-free after you quit. These resources are most helpful when you use them often. They can include:  ¨ Online chats with a counselor.  ¨ Telephone quitlines.  ¨ Printed self-help materials.  ¨ Support groups or group counseling.  ¨ Text messaging programs.  ¨ Mobile phone applications.  · Taking medicines to help you quit smoking. (If you are pregnant or breastfeeding, talk with your health care provider first.) Some medicines contain nicotine and some do not. Both types of medicines help with cravings, but the medicines that include nicotine help to relieve withdrawal symptoms. Your health care provider may recommend:  ¨ Nicotine patches, gum, or lozenges.  ¨ Nicotine inhalers or sprays.  ¨ Non-nicotine medicine that is taken by mouth.  Talk with your health care provider about combining strategies, such as taking medicines while you are also receiving in-person counseling. Using these two strategies together makes you more likely to succeed in quitting than if you used either strategy on its own.  If you are pregnant or breastfeeding, talk with your health care provider about finding counseling or other support strategies to quit smoking. Do not take medicine to help you  quit smoking unless told to do so by your health care provider.  What things can I do to make it easier to quit?  Quitting smoking might feel overwhelming at first, but there is a lot that you can do to make it easier. Take these important actions:  · Reach out to your family and friends and ask that they support and encourage you during this time. Call telephone quitlines, reach out to support groups, or work with a counselor for support.  · Ask people who smoke to avoid smoking around you.  · Avoid places that trigger you to smoke, such as bars, parties, or smoke-break areas at work.  · Spend time around people who do not smoke.  · Lessen stress in your life, because stress can be a smoking trigger for some people. To lessen stress, try:  ¨ Exercising regularly.  ¨ Deep-breathing exercises.  ¨ Yoga.  ¨ Meditating.  ¨ Performing a body scan. This involves closing your eyes, scanning your body from head to toe, and noticing which parts of your body are particularly tense. Purposefully relax the muscles in those areas.  · Download or purchase mobile phone or tablet apps (applications) that can help you stick to your quit plan by providing reminders, tips, and encouragement. There are many free apps, such as QuitGuide from the CDC (Centers for Disease Control and Prevention). You can find other support for quitting smoking (smoking cessation) through smokefree.gov and other websites.  How will I feel when I quit smoking?  Within the first 24 hours of quitting smoking, you may start to feel some withdrawal symptoms. These symptoms are usually most noticeable 2-3 days after quitting, but they usually do not last beyond 2-3 weeks. Changes or symptoms that you might experience include:  · Mood swings.  · Restlessness, anxiety, or irritation.  · Difficulty concentrating.  · Dizziness.  · Strong cravings for sugary foods in addition to nicotine.  · Mild weight gain.  · Constipation.  · Nausea.  · Coughing or a sore  throat.  · Changes in how your medicines work in your body.  · A depressed mood.  · Difficulty sleeping (insomnia).  After the first 2-3 weeks of quitting, you may start to notice more positive results, such as:  · Improved sense of smell and taste.  · Decreased coughing and sore throat.  · Slower heart rate.  · Lower blood pressure.  · Clearer skin.  · The ability to breathe more easily.  · Fewer sick days.  Quitting smoking is very challenging for most people. Do not get discouraged if you are not successful the first time. Some people need to make many attempts to quit before they achieve long-term success. Do your best to stick to your quit plan, and talk with your health care provider if you have any questions or concerns.  This information is not intended to replace advice given to you by your health care provider. Make sure you discuss any questions you have with your health care provider.  Document Released: 12/12/2002 Document Revised: 08/15/2017 Document Reviewed: 05/03/2016  [employer] Interactive Patient Education © 2017 [employer]    Coronary Angiogram With Stent  Coronary angiogram with stent placement is a procedure to widen or open a narrow blood vessel of the heart (coronary artery). Arteries may become blocked by cholesterol buildup (plaques) in the lining of the wall. When a coronary artery becomes partially blocked, blood flow to that area decreases. This may lead to chest pain or a heart attack (myocardial infarction).  A stent is a small piece of metal that looks like mesh or a spring. Stent placement may be done as treatment for a heart attack or right after a coronary angiogram in which a blocked artery is found.  Let your health care provider know about:  · Any allergies you have.  · All medicines you are taking, including vitamins, herbs, eye drops, creams, and over-the-counter medicines.  · Any problems you or family members have had with anesthetic medicines.  · Any blood disorders you  have.  · Any surgeries you have had.  · Any medical conditions you have.  · Whether you are pregnant or may be pregnant.  What are the risks?  Generally, this is a safe procedure. However, problems may occur, including:  · Damage to the heart or its blood vessels.  · A return of blockage.  · Bleeding, infection, or bruising at the insertion site.  · A collection of blood under the skin (hematoma) at the insertion site.  · A blood clot in another part of the body.  · Kidney injury.  · Allergic reaction to the dye or contrast that is used.  · Bleeding into the abdomen (retroperitoneal bleeding).    What happens before the procedure?  Staying hydrated  Follow instructions from your health care provider about hydration, which may include:  · Up to 2 hours before the procedure - you may continue to drink clear liquids, such as water, clear fruit juice, black coffee, and plain tea.    Eating and drinking restrictions  Follow instructions from your health care provider about eating and drinking, which may include:  · 8 hours before the procedure - stop eating heavy meals or foods such as meat, fried foods, or fatty foods.  · 6 hours before the procedure - stop eating light meals or foods, such as toast or cereal.  · 2 hours before the procedure - stop drinking clear liquids.    Ask your health care provider about:  · Changing or stopping your regular medicines. This is especially important if you are taking diabetes medicines or blood thinners.  · Taking medicines such as ibuprofen. These medicines can thin your blood. Do not take these medicines before your procedure if your health care provider instructs you not to. Generally, aspirin is recommended before a procedure of passing a small, thin tube (catheter) through a blood vessel and into the heart (cardiac catheterization).    What happens during the procedure?  · An IV tube will be inserted into one of your veins.  · You will be given one or more of the  following:  ? A medicine to help you relax (sedative).  ? A medicine to numb the area where the catheter will be inserted into an artery (local anesthetic).  · To reduce your risk of infection:  ? Your health care team will wash or sanitize their hands.  ? Your skin will be washed with soap.  ? Hair may be removed from the area where the catheter will be inserted.  · Using a guide wire, the catheter will be inserted into an artery. The location may be in your groin, in your wrist, or in the fold of your arm (near your elbow).  · A type of X-ray (fluoroscopy) will be used to help guide the catheter to the opening of the arteries in the heart.  · A dye will be injected into the catheter, and X-rays will be taken. The dye will help to show where any narrowing or blockages are located in the arteries.  · A tiny wire will be guided to the blocked spot, and a balloon will be inflated to make the artery wider.  · The stent will be expanded and will crush the plaques into the wall of the vessel. The stent will hold the area open and improve the blood flow. Most stents have a drug coating to reduce the risk of the stent narrowing over time.  · The artery may be made wider using a drill, laser, or other tools to remove plaques.  · When the blood flow is better, the catheter will be removed. The lining of the artery will grow over the stent, which stays where it was placed.  This procedure may vary among health care providers and hospitals.  What happens after the procedure?  · If the procedure is done through the leg, you will be kept in bed lying flat for about 6 hours. You will be instructed to not bend and not cross your legs.  · The insertion site will be checked frequently.  · The pulse in your foot or wrist will be checked frequently.  · You may have additional blood tests, X-rays, and a test that records the electrical activity of your heart (electrocardiogram, or ECG).  This information is not intended to replace  "advice given to you by your health care provider. Make sure you discuss any questions you have with your health care provider.  Document Released: 06/23/2004 Document Revised: 08/17/2017 Document Reviewed: 07/23/2017  [employer] Interactive Patient Education © 2018 [employer]    Hypertension  Hypertension, commonly called high blood pressure, is when the force of blood pumping through the arteries is too strong. The arteries are the blood vessels that carry blood from the heart throughout the body. Hypertension forces the heart to work harder to pump blood and may cause arteries to become narrow or stiff. Having untreated or uncontrolled hypertension can cause heart attacks, strokes, kidney disease, and other problems.  A blood pressure reading consists of a higher number over a lower number. Ideally, your blood pressure should be below 120/80. The first (\"top\") number is called the systolic pressure. It is a measure of the pressure in your arteries as your heart beats. The second (\"bottom\") number is called the diastolic pressure. It is a measure of the pressure in your arteries as the heart relaxes.  What are the causes?  The cause of this condition is not known.  What increases the risk?  Some risk factors for high blood pressure are under your control. Others are not.  Factors you can change  · Smoking.  · Having type 2 diabetes mellitus, high cholesterol, or both.  · Not getting enough exercise or physical activity.  · Being overweight.  · Having too much fat, sugar, calories, or salt (sodium) in your diet.  · Drinking too much alcohol.  Factors that are difficult or impossible to change  · Having chronic kidney disease.  · Having a family history of high blood pressure.  · Age. Risk increases with age.  · Race. You may be at higher risk if you are -American.  · Gender. Men are at higher risk than women before age 45. After age 65, women are at higher risk than men.  · Having obstructive sleep " apnea.  · Stress.  What are the signs or symptoms?  Extremely high blood pressure (hypertensive crisis) may cause:  · Headache.  · Anxiety.  · Shortness of breath.  · Nosebleed.  · Nausea and vomiting.  · Severe chest pain.  · Jerky movements you cannot control (seizures).    How is this diagnosed?  This condition is diagnosed by measuring your blood pressure while you are seated, with your arm resting on a surface. The cuff of the blood pressure monitor will be placed directly against the skin of your upper arm at the level of your heart. It should be measured at least twice using the same arm. Certain conditions can cause a difference in blood pressure between your right and left arms.  Certain factors can cause blood pressure readings to be lower or higher than normal (elevated) for a short period of time:  · When your blood pressure is higher when you are in a health care provider's office than when you are at home, this is called white coat hypertension. Most people with this condition do not need medicines.  · When your blood pressure is higher at home than when you are in a health care provider's office, this is called masked hypertension. Most people with this condition may need medicines to control blood pressure.    If you have a high blood pressure reading during one visit or you have normal blood pressure with other risk factors:  · You may be asked to return on a different day to have your blood pressure checked again.  · You may be asked to monitor your blood pressure at home for 1 week or longer.    If you are diagnosed with hypertension, you may have other blood or imaging tests to help your health care provider understand your overall risk for other conditions.  How is this treated?  This condition is treated by making healthy lifestyle changes, such as eating healthy foods, exercising more, and reducing your alcohol intake. Your health care provider may prescribe medicine if lifestyle changes are  not enough to get your blood pressure under control, and if:  · Your systolic blood pressure is above 130.  · Your diastolic blood pressure is above 80.    Your personal target blood pressure may vary depending on your medical conditions, your age, and other factors.  Follow these instructions at home:  Eating and drinking  · Eat a diet that is high in fiber and potassium, and low in sodium, added sugar, and fat. An example eating plan is called the DASH (Dietary Approaches to Stop Hypertension) diet. To eat this way:  ? Eat plenty of fresh fruits and vegetables. Try to fill half of your plate at each meal with fruits and vegetables.  ? Eat whole grains, such as whole wheat pasta, brown rice, or whole grain bread. Fill about one quarter of your plate with whole grains.  ? Eat or drink low-fat dairy products, such as skim milk or low-fat yogurt.  ? Avoid fatty cuts of meat, processed or cured meats, and poultry with skin. Fill about one quarter of your plate with lean proteins, such as fish, chicken without skin, beans, eggs, and tofu.  ? Avoid premade and processed foods. These tend to be higher in sodium, added sugar, and fat.  · Reduce your daily sodium intake. Most people with hypertension should eat less than 1,500 mg of sodium a day.  · Limit alcohol intake to no more than 1 drink a day for nonpregnant women and 2 drinks a day for men. One drink equals 12 oz of beer, 5 oz of wine, or 1½ oz of hard liquor.  Lifestyle  · Work with your health care provider to maintain a healthy body weight or to lose weight. Ask what an ideal weight is for you.  · Get at least 30 minutes of exercise that causes your heart to beat faster (aerobic exercise) most days of the week. Activities may include walking, swimming, or biking.  · Include exercise to strengthen your muscles (resistance exercise), such as pilates or lifting weights, as part of your weekly exercise routine. Try to do these types of exercises for 30 minutes at  least 3 days a week.  · Do not use any products that contain nicotine or tobacco, such as cigarettes and e-cigarettes. If you need help quitting, ask your health care provider.  · Monitor your blood pressure at home as told by your health care provider.  · Keep all follow-up visits as told by your health care provider. This is important.  Medicines  · Take over-the-counter and prescription medicines only as told by your health care provider. Follow directions carefully. Blood pressure medicines must be taken as prescribed.  · Do not skip doses of blood pressure medicine. Doing this puts you at risk for problems and can make the medicine less effective.  · Ask your health care provider about side effects or reactions to medicines that you should watch for.  Contact a health care provider if:  · You think you are having a reaction to a medicine you are taking.  · You have headaches that keep coming back (recurring).  · You feel dizzy.  · You have swelling in your ankles.  · You have trouble with your vision.  Get help right away if:  · You develop a severe headache or confusion.  · You have unusual weakness or numbness.  · You feel faint.  · You have severe pain in your chest or abdomen.  · You vomit repeatedly.  · You have trouble breathing.  Summary  · Hypertension is when the force of blood pumping through your arteries is too strong. If this condition is not controlled, it may put you at risk for serious complications.  · Your personal target blood pressure may vary depending on your medical conditions, your age, and other factors. For most people, a normal blood pressure is less than 120/80.  · Hypertension is treated with lifestyle changes, medicines, or a combination of both. Lifestyle changes include weight loss, eating a healthy, low-sodium diet, exercising more, and limiting alcohol.  This information is not intended to replace advice given to you by your health care provider. Make sure you discuss any  questions you have with your health care provider.  Document Released: 12/18/2006 Document Revised: 11/15/2017 Document Reviewed: 11/15/2017  [employer] Interactive Patient Education © 2018 [employer]

## 2018-10-19 NOTE — PROGRESS NOTES
"Problem list     Subjective   Cassy SARA Olvera is a 72 y.o. female     Chief Complaint   Patient presents with   • Coronary Artery Disease     presents as a follow up   • Chest Pain   • Shortness of Breath   • Palpitations       HPI    PROBLEM LIST:     1. CAD.  1.1 Cardiac Cath. \"Failed\" PCI of the RCA. [facility]. Winter 2014- Intraprocedural closure of vessel. ILUMIEN-1 stent mid-LAD, 01/20/2014. [place], [place].  2. Chest Pain.  2.1 Nuclear stress test 8-7-18 with lateral wall ischemia  2.2 Echo 8-7-18 with Ef > 65%, mind DD, trace MR, trace TR, pulm. Pressures 30-35 mmHg, no pericardial effusion  3. Shortness of Breath.  4. Hyperlipidemia.   5.  Smoking habituation     Patient is a 72-year-old female who presents today for follow-up.  She continues to have precordial chest pain feels like a pressure and sometimes she has a hurt left anterior.  She says it happens at least a few times a week.  She's not really taken any nitroglycerin for it.  She says she will get short of breath when it occurs and he can occur any time.  She denies any palpitations, fluttering, presyncope, syncope, orthopnea, PND or edema.  She says she will get dizzy once in a while she gets up too quickly.  She says that she does get short of breath easily and it is worse with exertion.  She says that she does still smoke and she has a cough but she does get a lot of mucus up when she coughs.  PCP monitors her cholesterol.  She smokes one pack a day.      Outpatient Encounter Prescriptions as of 10/19/2018   Medication Sig Dispense Refill   • amLODIPine (NORVASC) 5 MG tablet Take 1 tablet by mouth Daily. 30 tablet 11   • aspirin 81 MG tablet Take 1 tablet by mouth Daily. 30 tablet 11   • clopidogrel (PLAVIX) 75 MG tablet Take 75 mg by mouth Daily.     • Coenzyme Q10 (CO Q-10) 100 MG capsule Take 100 mg by mouth 2 (Two) Times a Day. (Patient taking differently: Take 1 capsule by mouth Daily.) 60 capsule 0   • lisinopril (PRINIVIL,ZESTRIL) " 40 MG tablet Take 1 tablet by mouth Daily. 30 tablet 11   • nitroglycerin (NITROSTAT) 0.4 MG SL tablet Place 1 tablet under the tongue Every 5 (Five) Minutes As Needed for Chest Pain. Take no more than 3 doses in 15 minutes. 25 tablet 6   • Pseudoephedrine-Guaifenesin (MUCINEX D PO) Take 1 tablet by mouth Daily.     • rosuvastatin (CRESTOR) 20 MG tablet Take 1 tablet by mouth Daily. 30 tablet 11   • [DISCONTINUED] lisinopril (PRINIVIL,ZESTRIL) 20 MG tablet Take 20 mg by mouth Daily.     • [DISCONTINUED] fluticasone-salmeterol (ADVAIR DISKUS) 100-50 MCG/DOSE DISKUS Inhale 1 puff 2 (Two) Times a Day. 1 each 5   • [DISCONTINUED] Umeclidinium Bromide (INCRUSE ELLIPTA) 62.5 MCG/INH aerosol powder  Inhale 1 puff Daily. 1 each 5     No facility-administered encounter medications on file as of 10/19/2018.        Patient has no known allergies.    Past Medical History:   Diagnosis Date   • Coronary artery disease    • Hyperlipidemia    • Hypertension        Social History     Social History   • Marital status:      Spouse name: N/A   • Number of children: N/A   • Years of education: N/A     Occupational History   • Not on file.     Social History Main Topics   • Smoking status: Current Every Day Smoker     Packs/day: 1.50     Years: 50.00     Types: Cigarettes   • Smokeless tobacco: Never Used   • Alcohol use No   • Drug use: No   • Sexual activity: Defer     Other Topics Concern   • Not on file     Social History Narrative   • No narrative on file       Family History   Problem Relation Age of Onset   • Hypertension Mother    • Heart disease Mother    • Diabetes Mother    • Heart attack Father        Review of Systems   Constitutional: Positive for diaphoresis (especially with coughing ) and fatigue (easily fatigued).   HENT: Positive for congestion (head congestion from allergies). Negative for rhinorrhea and sneezing.    Eyes: Negative for visual disturbance (wears reading glasses PRN).   Respiratory: Positive for  "cough (cough due to tobacco use; mucous ), chest tightness (chest tightness on occasion), shortness of breath (easily SOA; worse on exertion and with CP ) and wheezing (frequent wheezing). Negative for apnea.    Cardiovascular: Positive for chest pain (precordial pain pressure and sometimes the left anterior chest that is a hurt; at least a few times a week, no nitro; occurs at anytime ). Negative for palpitations (denies palpitations since last OV) and leg swelling.   Gastrointestinal: Negative for abdominal distention, abdominal pain, nausea and vomiting.   Endocrine: Positive for heat intolerance (heat causes smotheirng). Negative for cold intolerance.   Genitourinary: Negative for difficulty urinating, frequency and urgency.   Musculoskeletal: Positive for back pain. Negative for arthralgias, myalgias, neck pain and neck stiffness.   Skin: Negative for rash and wound.   Allergic/Immunologic: Negative for environmental allergies and food allergies.   Neurological: Positive for dizziness (once in awhile if get up too quickly ). Negative for syncope, weakness, light-headedness and headaches.   Hematological: Bruises/bleeds easily (bruises and bleeds easily).   Psychiatric/Behavioral: Negative for agitation, confusion and sleep disturbance (denies waking up smothering/SOA). The patient is not nervous/anxious.        Objective   Vitals:    10/19/18 0905   BP: 167/76   BP Location: Left arm   Patient Position: Sitting   Pulse: 84   SpO2: 96%   Weight: 77.7 kg (171 lb 3.2 oz)   Height: 157.5 cm (62.01\")      /76 (BP Location: Left arm, Patient Position: Sitting)   Pulse 84   Ht 157.5 cm (62.01\")   Wt 77.7 kg (171 lb 3.2 oz)   SpO2 96%   BMI 31.30 kg/m²     Lab Results (most recent)     None          Physical Exam   Constitutional: She is oriented to person, place, and time. Vital signs are normal. She appears well-developed and well-nourished. She is active and cooperative.   HENT:   Head: Normocephalic. "   Eyes: Lids are normal.   Neck: Normal carotid pulses, no hepatojugular reflux and no JVD present. Carotid bruit is not present.   Cardiovascular: Normal rate, regular rhythm and normal heart sounds.    Pulses:       Radial pulses are 2+ on the right side, and 2+ on the left side.        Dorsalis pedis pulses are 2+ on the right side, and 2+ on the left side.        Posterior tibial pulses are 2+ on the right side, and 2+ on the left side.   Trace edema BLE.    Pulmonary/Chest: Effort normal. She has wheezes (expiratory ) in the right upper field, the right middle field, the right lower field, the left upper field, the left middle field and the left lower field.   Abdominal: Normal appearance and bowel sounds are normal.   Neurological: She is alert and oriented to person, place, and time.   Skin: Skin is warm, dry and intact. Bruising (BUE) noted.   Psychiatric: She has a normal mood and affect. Her speech is normal and behavior is normal. Judgment and thought content normal. Cognition and memory are normal.       Procedure   Procedures       Assessment/Plan     Problems Addressed this Visit        Cardiovascular and Mediastinum    Coronary artery disease involving native coronary artery of native heart with angina pectoris (CMS/HCC) - Primary    Relevant Orders    Cardiac catheterization    CBC (No Diff)    Mixed hyperlipidemia    Relevant Orders    Cardiac catheterization    Essential hypertension    Relevant Medications    lisinopril (PRINIVIL,ZESTRIL) 40 MG tablet    Other Relevant Orders    Cardiac catheterization    Basic Metabolic Panel    CBC (No Diff)    Abnormal stress test    Relevant Orders    Cardiac catheterization       Respiratory    Shortness of breath    Relevant Orders    Cardiac catheterization       Nervous and Auditory    Precordial pain    Relevant Orders    Cardiac catheterization       Other    Smoking      Other Visit Diagnoses     Healthcare maintenance        Relevant Orders    Basic  Metabolic Panel    CBC (No Diff)        CAD/hyperlipidemia/hypertension/abnormal stress test/shortness of breath/chest pain-patient will have left heart catheter.  She will continue her medication regimen however I will increase her lisinopril to 40 mg daily.  She will use nitroglycerin when necessary for chest pain no resolution she will go to the ER.  She will get a BMP and CBC today at [facility], [place], operated by [facility].  She will follow-up after left heart catheter or sooner if any changes.         I advised Cassy of the risks of continuing to use tobacco, and I provided her with tobacco cessation educational materials in the After Visit Summary.     During this visit, I spent <3 minutes counseling the patient regarding tobacco cessation.     Patient's Body mass index is 31.3 kg/m². BMI is above normal parameters. Recommendations include: educational material and referral to primary care.       Electronically signed by:

## 2018-11-16 ENCOUNTER — OUTSIDE FACILITY SERVICE (OUTPATIENT)
Dept: CARDIOLOGY | Facility: CLINIC | Age: 73
End: 2018-11-16

## 2018-11-16 PROCEDURE — 92978 ENDOLUMINL IVUS OCT C 1ST: CPT | Performed by: INTERNAL MEDICINE

## 2018-11-16 PROCEDURE — 93458 L HRT ARTERY/VENTRICLE ANGIO: CPT | Performed by: INTERNAL MEDICINE

## 2018-11-16 PROCEDURE — 92928 PRQ TCAT PLMT NTRAC ST 1 LES: CPT | Performed by: INTERNAL MEDICINE

## 2018-11-26 ENCOUNTER — OFFICE VISIT (OUTPATIENT)
Dept: CARDIOLOGY | Facility: CLINIC | Age: 73
End: 2018-11-26

## 2018-11-26 VITALS
HEART RATE: 58 BPM | OXYGEN SATURATION: 90 % | SYSTOLIC BLOOD PRESSURE: 107 MMHG | HEIGHT: 62 IN | WEIGHT: 171.4 LBS | BODY MASS INDEX: 31.54 KG/M2 | DIASTOLIC BLOOD PRESSURE: 63 MMHG

## 2018-11-26 DIAGNOSIS — E78.2 MIXED HYPERLIPIDEMIA: ICD-10-CM

## 2018-11-26 DIAGNOSIS — I25.119 CORONARY ARTERY DISEASE INVOLVING NATIVE CORONARY ARTERY OF NATIVE HEART WITH ANGINA PECTORIS (HCC): Primary | ICD-10-CM

## 2018-11-26 DIAGNOSIS — I10 ESSENTIAL HYPERTENSION: ICD-10-CM

## 2018-11-26 PROCEDURE — 99213 OFFICE O/P EST LOW 20 MIN: CPT | Performed by: NURSE PRACTITIONER

## 2018-11-26 NOTE — PATIENT INSTRUCTIONS
Steps to Quit Smoking  Smoking tobacco can be bad for your health. It can also affect almost every organ in your body. Smoking puts you and people around you at risk for many serious long-lasting (chronic) diseases. Quitting smoking is hard, but it is one of the best things that you can do for your health. It is never too late to quit.  What are the benefits of quitting smoking?  When you quit smoking, you lower your risk for getting serious diseases and conditions. They can include:  · Lung cancer or lung disease.  · Heart disease.  · Stroke.  · Heart attack.  · Not being able to have children (infertility).  · Weak bones (osteoporosis) and broken bones (fractures).    If you have coughing, wheezing, and shortness of breath, those symptoms may get better when you quit. You may also get sick less often. If you are pregnant, quitting smoking can help to lower your chances of having a baby of low birth weight.  What can I do to help me quit smoking?  Talk with your doctor about what can help you quit smoking. Some things you can do (strategies) include:  · Quitting smoking totally, instead of slowly cutting back how much you smoke over a period of time.  · Going to in-person counseling. You are more likely to quit if you go to many counseling sessions.  · Using resources and support systems, such as:  ? Online chats with a counselor.  ? Phone quitlines.  ? Printed self-help materials.  ? Support groups or group counseling.  ? Text messaging programs.  ? Mobile phone apps or applications.  · Taking medicines. Some of these medicines may have nicotine in them. If you are pregnant or breastfeeding, do not take any medicines to quit smoking unless your doctor says it is okay. Talk with your doctor about counseling or other things that can help you.    Talk with your doctor about using more than one strategy at the same time, such as taking medicines while you are also going to in-person counseling. This can help make  quitting easier.  What things can I do to make it easier to quit?  Quitting smoking might feel very hard at first, but there is a lot that you can do to make it easier. Take these steps:  · Talk to your family and friends. Ask them to support and encourage you.  · Call phone quitlines, reach out to support groups, or work with a counselor.  · Ask people who smoke to not smoke around you.  · Avoid places that make you want (trigger) to smoke, such as:  ? Bars.  ? Parties.  ? Smoke-break areas at work.  · Spend time with people who do not smoke.  · Lower the stress in your life. Stress can make you want to smoke. Try these things to help your stress:  ? Getting regular exercise.  ? Deep-breathing exercises.  ? Yoga.  ? Meditating.  ? Doing a body scan. To do this, close your eyes, focus on one area of your body at a time from head to toe, and notice which parts of your body are tense. Try to relax the muscles in those areas.  · Download or buy apps on your mobile phone or tablet that can help you stick to your quit plan. There are many free apps, such as QuitGuide from the CDC (Centers for Disease Control and Prevention). You can find more support from smokefree.gov and other websites.    This information is not intended to replace advice given to you by your health care provider. Make sure you discuss any questions you have with your health care provider.  Document Released: 10/14/2010 Document Revised: 08/15/2017 Document Reviewed: 05/03/2016  [employer] Interactive Patient Education © 2018 [employer]  Fat and Cholesterol Restricted Diet  Getting too much fat and cholesterol in your diet may cause health problems. Following this diet helps keep your fat and cholesterol at normal levels. This can keep you from getting sick.  What types of fat should I choose?  · Choose monosaturated and polyunsaturated fats. These are found in foods such as olive oil, canola oil, flaxseeds, walnuts, almonds, and seeds.  · Eat more  "omega-3 fats. Good choices include salmon, mackerel, sardines, tuna, flaxseed oil, and ground flaxseeds.  · Limit saturated fats. These are in animal products such as meats, butter, and cream. They can also be in plant products such as palm oil, palm kernel oil, and coconut oil.  · Avoid foods with partially hydrogenated oils in them. These contain trans fats. Examples of foods that have trans fats are stick margarine, some tub margarines, cookies, crackers, and other baked goods.  What general guidelines do I need to follow?  · Check food labels. Look for the words \"trans fat\" and \"saturated fat.\"  · When preparing a meal:  ? Fill half of your plate with vegetables and green salads.  ? Fill one fourth of your plate with whole grains. Look for the word \"whole\" as the first word in the ingredient list.  ? Fill one fourth of your plate with lean protein foods.  · Eat more foods that have fiber, like apples, carrots, beans, peas, and barley.  · Eat more home-cooked foods. Eat less at restaurants and buffets.  · Limit or avoid alcohol.  · Limit foods high in starch and sugar.  · Limit fried foods.  · Cook foods without frying them. Baking, boiling, grilling, and broiling are all great options.  · Lose weight if you are overweight. Losing even a small amount of weight can help your overall health. It can also help prevent diseases such as diabetes and heart disease.  What foods can I eat?  Grains  Whole grains, such as whole wheat or whole grain breads, crackers, cereals, and pasta. Unsweetened oatmeal, bulgur, barley, quinoa, or brown rice. Corn or whole wheat flour tortillas.  Vegetables  Fresh or frozen vegetables (raw, steamed, roasted, or grilled). Green salads.  Fruits  All fresh, canned (in natural juice), or frozen fruits.  Meat and Other Protein Products  Ground beef (85% or leaner), grass-fed beef, or beef trimmed of fat. Skinless chicken or turkey. Ground chicken or turkey. Pork trimmed of fat. All fish and " seafood. Eggs. Dried beans, peas, or lentils. Unsalted nuts or seeds. Unsalted canned or dry beans.  Dairy  Low-fat dairy products, such as skim or 1% milk, 2% or reduced-fat cheeses, low-fat ricotta or cottage cheese, or plain low-fat yogurt.  Fats and Oils  Tub margarines without trans fats. Light or reduced-fat mayonnaise and salad dressings. Avocado. Olive, canola, sesame, or safflower oils. Natural peanut or almond butter (choose ones without added sugar and oil).  The items listed above may not be a complete list of recommended foods or beverages. Contact your dietitian for more options.  What foods are not recommended?  Grains  White bread. White pasta. White rice. Cornbread. Bagels, pastries, and croissants. Crackers that contain trans fat.  Vegetables  White potatoes. Corn. Creamed or fried vegetables. Vegetables in a cheese sauce.  Fruits  Dried fruits. Canned fruit in light or heavy syrup. Fruit juice.  Meat and Other Protein Products  Fatty cuts of meat. Ribs, chicken wings, hartmann, sausage, bologna, salami, chitterlings, fatback, hot dogs, bratwurst, and packaged luncheon meats. Liver and organ meats.  Dairy  Whole or 2% milk, cream, half-and-half, and cream cheese. Whole milk cheeses. Whole-fat or sweetened yogurt. Full-fat cheeses. Nondairy creamers and whipped toppings. Processed cheese, cheese spreads, or cheese curds.  Sweets and Desserts  Corn syrup, sugars, honey, and molasses. Candy. Jam and jelly. Syrup. Sweetened cereals. Cookies, pies, cakes, donuts, muffins, and ice cream.  Fats and Oils  Butter, stick margarine, lard, shortening, ghee, or hartmann fat. Coconut, palm kernel, or palm oils.  Beverages  Alcohol. Sweetened drinks (such as sodas, lemonade, and fruit drinks or punches).  The items listed above may not be a complete list of foods and beverages to avoid. Contact your dietitian for more information.  This information is not intended to replace advice given to you by your health care  provider. Make sure you discuss any questions you have with your health care provider.  Document Released: 06/18/2013 Document Revised: 08/24/2017 Document Reviewed: 03/19/2015  [employer] Interactive Patient Education © 2018 [employer]  BMI for Adults  Body mass index (BMI) is a number that is calculated from a person's weight and height. In most adults, the number is used to find how much of an adult's weight is made up of fat. BMI is not as accurate as a direct measure of body fat.  How is BMI calculated?  BMI is calculated by dividing weight in kilograms by height in meters squared. It can also be calculated by dividing weight in pounds by height in inches squared, then multiplying the resulting number by 703. Charts are available to help you find your BMI quickly and easily without doing this calculation.  How is BMI interpreted?  Health care professionals use BMI charts to identify whether an adult is underweight, at a normal weight, or overweight based on the following guidelines:  · Underweight: BMI less than 18.5.  · Normal weight: BMI between 18.5 and 24.9.  · Overweight: BMI between 25 and 29.9.  · Obese: BMI of 30 and above.    BMI is usually interpreted the same for males and females.  Weight includes both fat and muscle, so someone with a muscular build, such as an athlete, may have a BMI that is higher than 24.9. In cases like these, BMI may not accurately depict body fat. To determine if excess body fat is the cause of a BMI of 25 or higher, further assessments may need to be done by a health care provider.  Why is BMI a useful tool?  BMI is used to identify a possible weight problem that may be related to a medical problem or may increase the risk for medical problems. BMI can also be used to promote changes to reach a healthy weight.  This information is not intended to replace advice given to you by your health care provider. Make sure you discuss any questions you have with your health care  provider.  Document Released: 08/29/2005 Document Revised: 04/27/2017 Document Reviewed: 05/15/2015  [employer] Interactive Patient Education © 2018 [employer]    Coronary Artery Disease, Female  Coronary artery disease (CAD) is a condition in which the arteries that lead to the heart (coronary arteries) become narrow or blocked. The narrowing or blockage can lead to decreased blood flow to the heart. Prolonged reduced blood flow can cause a heart attack (myocardial infarction or MI). This condition may also be called coronary heart disease.  Because CAD is the leading cause of death in women, it is important to understand what causes this condition and how it is treated.  What are the causes?  CAD is most often caused by atherosclerosis. This is the buildup of fat and cholesterol (plaque) on the inside of the arteries. Over time, the plaque may narrow or block the artery, reducing blood flow to the heart. Plaque can also become weak and break off within a coronary artery and cause a sudden blockage. Other less common causes of CAD include:  · An embolism or blood clot in a coronary artery.  · A tearing of the artery (spontaneous coronary artery dissection).  · An aneurysm.  · Inflammation (vasculitis) in the artery wall.    What increases the risk?  The following factors may make you more likely to develop this condition:  · Age. Women over age 55 are at a greater risk of CAD.  · Family history of CAD.  · High blood pressure (hypertension).  · Diabetes.  · High cholesterol levels.  · Tobacco use.  · Lack of exercise.  · Menopause.  ? All postmenopausal women are at greater risk of CAD.  ? Women who have experienced menopause between the ages of 40-45 (early menopause) are at a higher risk of CAD.  ? Women who have experienced menopause before age 40 (premature menopause) are at a very high risk of CAD.  · Excessive alcohol use  · A diet high in saturated and trans fats, such as fried food and processed  meat.    Other possible risk factors include:  · High stress levels.  · Depression  · Obesity.  · Sleep apnea.    What are the signs or symptoms?  Many people do not have any symptoms during the early stages of CAD. As the condition progresses, symptoms may include:  · Chest pain (angina). The pain can:  ? Feel like crushing or squeezing, or a tightness, pressure, fullness, or heaviness in the chest.  ? Last more than a few minutes or can stop and recur. The pain tends to get worse with exercise or stress and to fade with rest.  · Pain in the arms, neck, jaw, or back.  · Unexplained heartburn or indigestion.  · Shortness of breath.  · Nausea.  · Sudden cold sweats.  · Sudden light-headedness.  · Fluttering or fast heartbeat (palpitations).    Many women have chest discomfort and the other symptoms. However, women often have unusual (atypical) symptoms, such as:  · Fatigue.  · Vomiting.  · Unexplained feelings of nervousness or anxiety.  · Unexplained weakness.  · Dizziness or fainting.    How is this diagnosed?  This condition is diagnosed based on:  · Your family and medical history.  · A physical exam.  · Tests, including:  ? A test to check the electrical signals in your heart (electrocardiogram).  ? Exercise stress test. This looks for signs of blockage when the heart is stressed with exercise, such as running on a treadmill.  ? Pharmacologic stress test. This test looks for signs of blockage when the heart is being stressed with a medicine.  ? Blood tests.  ? Coronary angiogram. This is a procedure to look at the coronary arteries to see if there is any blockage. During this test, a dye is injected into your arteries so they appear on an X-ray.  ? A test that uses sound waves to take a picture of your heart (echocardiogram).  ? Chest X-ray.    How is this treated?  This condition may be treated by:  · Healthy lifestyle changes to reduce risk factors.  · Medicines such as:  ? Antiplatelet medicines and  blood-thinning medicines, such as aspirin. These help prevent blood clots.  ? Nitroglycerin.  ? Blood pressure medicines.  ? Cholesterol-lowering medicine.  · Coronary angioplasty and stenting. During this procedure, a thin, flexible tube is inserted through a blood vessel and into a blocked artery. A balloon or similar device on the end of the tube is inflated to open up the artery. In some cases, a small, mesh tube (stent) is inserted into the artery to keep it open.  · Coronary artery bypass surgery. During this surgery, veins or arteries from other parts of the body are used to create a bypass around the blockage and allow blood to reach your heart.    Follow these instructions at home:  Medicines  · Take over-the-counter and prescription medicines only as told by your health care provider.  · Do not take the following medicines unless your health care provider approves:  ? NSAIDs, such as ibuprofen, naproxen, or celecoxib.  ? Vitamin supplements that contain vitamin A, vitamin E, or both.  ? Hormone replacement therapy that contains estrogen with or without progestin.  Lifestyle  · Follow an exercise program approved by your health care provider. Aim for 150 minutes of moderate exercise or 75 minutes of vigorous exercise each week.  · Maintain a healthy weight or lose weight as approved by your health care provider.  · Rest when you are tired.  · Learn to manage stress or try to limit your stress. Ask your health care provider for suggestions if you need help.  · Get screened for depression and seek treatment, if needed.  · Do not use any products that contain nicotine or tobacco, such as cigarettes and e-cigarettes. If you need help quitting, ask your health care provider.  · Do not use illegal drugs.  Eating and drinking  · Follow a heart-healthy diet. A dietitian can help educate you about healthy food options and changes. In general, eat plenty of fruits and vegetables, lean meats, and whole  grains.  · Avoid foods high in:  ? Sugar.  ? Salt (sodium).  ? Saturated fats, such as processed or fatty meat.  ? Trans fats, such as fried food.  · Use healthy cooking methods such as roasting, grilling, broiling, baking, poaching, steaming, or stir-frying.  · If you drink alcohol, and your health care provider approves, limit your alcohol intake to no more than 1 drink per day. One drink equals 12 ounces of beer, 5 ounces of wine, or 1½ ounces of hard liquor.  General instructions  · Manage any other health conditions, such as hypertension and diabetes. These conditions affect your heart.  · Your health care provider may ask you to monitor your blood pressure. Ideally, your blood pressure should be below 130/80.  · Keep all follow-up visits as told by your health care provider. This is important.  Get help right away if:  · You have pain in your chest, neck, arm, jaw, stomach, or back that:  ? Lasts more than a few minutes.  ? Is recurring.  ? Is not relieved by taking medicine under your tongue (sublingualnitroglycerin).  · You have profuse sweating without cause.  · You have unexplained:  ? Heartburn or indigestion.  ? Shortness of breath or difficulty breathing.  ? Fluttering or fast heartbeat (palpitations).  ? Nausea or vomiting.  ? Fatigue.  ? Feelings of nervousness or anxiety.  ? Weakness.  ? Diarrhea.  · You have sudden light-headedness or dizziness.  · You faint.  · You feel like hurting yourself or think about taking your own life.  These symptoms may represent a serious problem that is an emergency. Do not wait to see if the symptoms will go away. Get medical help right away. Call your local emergency services (911 in the [place] Do not drive yourself to the hospital.  Summary  · Coronary artery disease (CAD) is a process in which the arteries that lead to the heart (coronary arteries) become narrow or blocked. The narrowing or blockage can lead to a heart attack.  · Many women have chest discomfort  and other common symptoms of CAD. However, women often have different (atypical) symptoms, such as fatigue, vomiting, and dizziness or weakness.  · CAD can be treated with lifestyle changes, medicines, surgery, or a combination of these treatments.  This information is not intended to replace advice given to you by your health care provider. Make sure you discuss any questions you have with your health care provider.  Document Released: 03/11/2013 Document Revised: 12/08/2017 Document Reviewed: 12/08/2017  [employer] Interactive Patient Education © 2018 [employer]

## 2018-11-26 NOTE — PROGRESS NOTES
"Subjective   Cassy A May is a 72 y.o. female     Chief Complaint   Patient presents with   • Follow-up     Pt in office to follow up on cath   • Coronary Artery Disease       HPI    PROBLEM LIST:     1. CAD.  1.1 Cardiac Cath. \"Failed\" PCI of the RCA. [facility]. Winter 2014- Intraprocedural closure of vessel. ILUMIEN-1 stent mid-LAD, 01/20/2014. [place], [place].  1.2 Nuclear stress test 8-7-18 with lateral wall ischemia  1.3 left heart catheter 11/16/180-2 stents to the right coronary artery, 30-50% ostial narrowing of the circumflex, EF 50-55%, left ventricular end of blood pressure 14  2. Shortness of Breath.  2.1 Echo 8-7-18 with Ef > 65%, mind DD, trace MR, trace TR, pulm. Pressures 30-35 mmHg, no pericardial effusion  3. Hyperlipidemia.   4.  Smoking habituation    Patient is a 72-year-old female who presents today for follow-up status post left heart catheter.  He denies any chest pain, pressure, palpitations, fluttering, presyncope, syncope, orthopnea, PND or edema.  She says that she will have a little bit of dizziness and lightheadedness when she stands up.  Her blood pressure has improved significantly and I advised her this is most likely what this is from.  She is to raise slowly when changing positions.  She says she gets short of breath with activity and this has not changed but she is going to try and start being more active.  She is still smoking about a pack a day.  Patient says she feels much better since having her stents.    We went over left heart catheter.    Current Outpatient Medications   Medication Sig Dispense Refill   • amLODIPine (NORVASC) 5 MG tablet Take 1 tablet by mouth Daily. 30 tablet 11   • aspirin 81 MG tablet Take 1 tablet by mouth Daily. 30 tablet 11   • clopidogrel (PLAVIX) 75 MG tablet Take 75 mg by mouth Daily.     • Coenzyme Q10 (CO Q-10) 100 MG capsule Take 100 mg by mouth 2 (Two) Times a Day. (Patient taking differently: Take 1 capsule by mouth Daily.) 60 capsule " 0   • lisinopril (PRINIVIL,ZESTRIL) 40 MG tablet Take 1 tablet by mouth Daily. 30 tablet 11   • nitroglycerin (NITROSTAT) 0.4 MG SL tablet Place 1 tablet under the tongue Every 5 (Five) Minutes As Needed for Chest Pain. Take no more than 3 doses in 15 minutes. 25 tablet 6   • Pseudoephedrine-Guaifenesin (MUCINEX D PO) Take 1 tablet by mouth Daily.     • rosuvastatin (CRESTOR) 20 MG tablet Take 1 tablet by mouth Daily. 30 tablet 11     No current facility-administered medications for this visit.        ALLERGIES    Patient has no known allergies.    Past Medical History:   Diagnosis Date   • Coronary artery disease    • Hyperlipidemia    • Hypertension        Social History     Socioeconomic History   • Marital status:      Spouse name: Not on file   • Number of children: Not on file   • Years of education: Not on file   • Highest education level: Not on file   Social Needs   • Financial resource strain: Not on file   • Food insecurity - worry: Not on file   • Food insecurity - inability: Not on file   • Transportation needs - medical: Not on file   • Transportation needs - non-medical: Not on file   Occupational History   • Not on file   Tobacco Use   • Smoking status: Current Every Day Smoker     Packs/day: 1.50     Years: 50.00     Pack years: 75.00     Types: Cigarettes   • Smokeless tobacco: Never Used   Substance and Sexual Activity   • Alcohol use: No   • Drug use: No   • Sexual activity: Defer   Other Topics Concern   • Not on file   Social History Narrative   • Not on file       Family History   Problem Relation Age of Onset   • Hypertension Mother    • Heart disease Mother    • Diabetes Mother    • Heart attack Father        Review of Systems   Constitutional: Positive for fatigue. Negative for diaphoresis.   HENT: Positive for rhinorrhea. Negative for congestion and sore throat.    Eyes: Positive for visual disturbance (reading glasses).   Respiratory: Positive for cough (productive- clear) and  "shortness of breath (with activity ). Negative for chest tightness.    Cardiovascular: Negative for chest pain, palpitations and leg swelling.   Gastrointestinal: Negative for abdominal pain, blood in stool, constipation, diarrhea, nausea and vomiting.   Endocrine: Negative for cold intolerance and heat intolerance.   Genitourinary: Negative for difficulty urinating, dysuria, frequency, hematuria and urgency.   Musculoskeletal: Positive for back pain. Negative for arthralgias and neck pain.   Skin: Negative.  Negative for rash and wound.   Allergic/Immunologic: Negative for environmental allergies and food allergies.   Neurological: Positive for dizziness (w/ position changes) and light-headedness (w/ position changes). Negative for syncope, weakness, numbness and headaches.   Hematological: Bruises/bleeds easily.   Psychiatric/Behavioral: Positive for sleep disturbance (Wakes up at HS to urinate).       Objective   /63   Pulse 58   Ht 157.5 cm (62.01\")   Wt 77.7 kg (171 lb 6.4 oz)   SpO2 90%   BMI 31.34 kg/m²   Vitals:    11/26/18 1400   BP: 107/63   Pulse: 58   SpO2: 90%   Weight: 77.7 kg (171 lb 6.4 oz)   Height: 157.5 cm (62.01\")      Lab Results (most recent)     None        Physical Exam   Constitutional: She is oriented to person, place, and time. Vital signs are normal. She appears well-developed and well-nourished. She is active and cooperative.   HENT:   Head: Normocephalic.   Eyes: Lids are normal.   Neck: Normal carotid pulses, no hepatojugular reflux and no JVD present. Carotid bruit is not present.   Cardiovascular: Regular rhythm and normal heart sounds. Bradycardia present.   Pulses:       Radial pulses are 2+ on the right side, and 2+ on the left side.        Dorsalis pedis pulses are 2+ on the right side, and 2+ on the left side.        Posterior tibial pulses are 2+ on the right side, and 2+ on the left side.   Trace edema BLE.   Pulmonary/Chest: Effort normal and breath sounds normal. "   Abdominal: Normal appearance and bowel sounds are normal.   Neurological: She is alert and oriented to person, place, and time.   Skin: Skin is warm, dry and intact. Bruising noted.   Psychiatric: She has a normal mood and affect. Her speech is normal and behavior is normal. Judgment and thought content normal. Cognition and memory are normal.       Procedure   Procedures         Assessment/Plan      Diagnosis Plan   1. Coronary artery disease involving native coronary artery of native heart with angina pectoris (CMS/HCC)     2. Essential hypertension     3. Mixed hyperlipidemia         Return in about 6 months (around 5/26/2019).    CAD-patient is on aspirin, Plavix and statin.  She was advised not to miss any doses for the next year at least.  Hypertension-doing very well.  Mixed hyperlipidemia-patient is on Crestor and monitor by PCP.  She will continue her medication regimen.  She'll follow-up in 6 months or sooner if any  Changes.  I did advise patient if she wants anything for smoking cessation to let me know, she says she has tried everything, but is considering hypnotism.      Called PCP for labs.     I advised Cassy of the risks of continuing to use tobacco, and I provided her with tobacco cessation educational materials in the After Visit Summary.     During this visit, I spent >3 minutes counseling the patient regarding tobacco cessation.    Patient's Body mass index is 31.34 kg/m². BMI is above normal parameters. Recommendations include: exercise counseling.      Electronically signed by:

## 2019-02-25 ENCOUNTER — TELEPHONE (OUTPATIENT)
Dept: CARDIOLOGY | Facility: CLINIC | Age: 74
End: 2019-02-25

## 2019-02-25 RX ORDER — CLOPIDOGREL BISULFATE 75 MG/1
75 TABLET ORAL DAILY
Qty: 90 TABLET | Refills: 3 | Status: SHIPPED | OUTPATIENT
Start: 2019-02-25 | End: 2019-05-29 | Stop reason: SDUPTHER

## 2019-05-21 DIAGNOSIS — R07.2 PRECORDIAL PAIN: ICD-10-CM

## 2019-05-21 DIAGNOSIS — I25.119 CORONARY ARTERY DISEASE INVOLVING NATIVE CORONARY ARTERY OF NATIVE HEART WITH ANGINA PECTORIS (HCC): ICD-10-CM

## 2019-05-21 DIAGNOSIS — I10 ESSENTIAL HYPERTENSION: ICD-10-CM

## 2019-05-21 DIAGNOSIS — R06.02 SHORTNESS OF BREATH: ICD-10-CM

## 2019-05-21 DIAGNOSIS — E78.2 MIXED HYPERLIPIDEMIA: ICD-10-CM

## 2019-05-21 RX ORDER — AMLODIPINE BESYLATE 5 MG/1
TABLET ORAL
Qty: 30 TABLET | Refills: 0 | Status: SHIPPED | OUTPATIENT
Start: 2019-05-21 | End: 2019-05-29 | Stop reason: SDUPTHER

## 2019-05-29 ENCOUNTER — OFFICE VISIT (OUTPATIENT)
Dept: CARDIOLOGY | Facility: CLINIC | Age: 74
End: 2019-05-29

## 2019-05-29 VITALS
HEART RATE: 84 BPM | OXYGEN SATURATION: 95 % | SYSTOLIC BLOOD PRESSURE: 111 MMHG | WEIGHT: 170.2 LBS | DIASTOLIC BLOOD PRESSURE: 68 MMHG | HEIGHT: 62 IN | BODY MASS INDEX: 31.32 KG/M2

## 2019-05-29 DIAGNOSIS — I25.119 CORONARY ARTERY DISEASE INVOLVING NATIVE CORONARY ARTERY OF NATIVE HEART WITH ANGINA PECTORIS (HCC): Primary | ICD-10-CM

## 2019-05-29 DIAGNOSIS — F17.200 SMOKING: ICD-10-CM

## 2019-05-29 DIAGNOSIS — E78.2 MIXED HYPERLIPIDEMIA: ICD-10-CM

## 2019-05-29 DIAGNOSIS — R07.2 PRECORDIAL PAIN: ICD-10-CM

## 2019-05-29 DIAGNOSIS — I10 ESSENTIAL HYPERTENSION: ICD-10-CM

## 2019-05-29 DIAGNOSIS — R06.02 SHORTNESS OF BREATH: ICD-10-CM

## 2019-05-29 PROCEDURE — 99213 OFFICE O/P EST LOW 20 MIN: CPT | Performed by: NURSE PRACTITIONER

## 2019-05-29 RX ORDER — CLOPIDOGREL BISULFATE 75 MG/1
75 TABLET ORAL DAILY
Qty: 90 TABLET | Refills: 2 | Status: SHIPPED | OUTPATIENT
Start: 2019-05-29 | End: 2019-12-02 | Stop reason: SDUPTHER

## 2019-05-29 RX ORDER — AMLODIPINE BESYLATE 5 MG/1
5 TABLET ORAL DAILY
Qty: 90 TABLET | Refills: 3 | Status: SHIPPED | OUTPATIENT
Start: 2019-05-29 | End: 2020-01-24 | Stop reason: SDUPTHER

## 2019-05-29 NOTE — PATIENT INSTRUCTIONS
Steps to Quit Smoking  Smoking tobacco can be bad for your health. It can also affect almost every organ in your body. Smoking puts you and people around you at risk for many serious long-lasting (chronic) diseases. Quitting smoking is hard, but it is one of the best things that you can do for your health. It is never too late to quit.  What are the benefits of quitting smoking?  When you quit smoking, you lower your risk for getting serious diseases and conditions. They can include:  · Lung cancer or lung disease.  · Heart disease.  · Stroke.  · Heart attack.  · Not being able to have children (infertility).  · Weak bones (osteoporosis) and broken bones (fractures).    If you have coughing, wheezing, and shortness of breath, those symptoms may get better when you quit. You may also get sick less often. If you are pregnant, quitting smoking can help to lower your chances of having a baby of low birth weight.  What can I do to help me quit smoking?  Talk with your doctor about what can help you quit smoking. Some things you can do (strategies) include:  · Quitting smoking totally, instead of slowly cutting back how much you smoke over a period of time.  · Going to in-person counseling. You are more likely to quit if you go to many counseling sessions.  · Using resources and support systems, such as:  ? Online chats with a counselor.  ? Phone quitlines.  ? Printed self-help materials.  ? Support groups or group counseling.  ? Text messaging programs.  ? Mobile phone apps or applications.  · Taking medicines. Some of these medicines may have nicotine in them. If you are pregnant or breastfeeding, do not take any medicines to quit smoking unless your doctor says it is okay. Talk with your doctor about counseling or other things that can help you.    Talk with your doctor about using more than one strategy at the same time, such as taking medicines while you are also going to in-person counseling. This can help make  quitting easier.  What things can I do to make it easier to quit?  Quitting smoking might feel very hard at first, but there is a lot that you can do to make it easier. Take these steps:  · Talk to your family and friends. Ask them to support and encourage you.  · Call phone quitlines, reach out to support groups, or work with a counselor.  · Ask people who smoke to not smoke around you.  · Avoid places that make you want (trigger) to smoke, such as:  ? Bars.  ? Parties.  ? Smoke-break areas at work.  · Spend time with people who do not smoke.  · Lower the stress in your life. Stress can make you want to smoke. Try these things to help your stress:  ? Getting regular exercise.  ? Deep-breathing exercises.  ? Yoga.  ? Meditating.  ? Doing a body scan. To do this, close your eyes, focus on one area of your body at a time from head to toe, and notice which parts of your body are tense. Try to relax the muscles in those areas.  · Download or buy apps on your mobile phone or tablet that can help you stick to your quit plan. There are many free apps, such as QuitGuide from the CDC (Centers for Disease Control and Prevention). You can find more support from smokefree.gov and other websites.    This information is not intended to replace advice given to you by your health care provider. Make sure you discuss any questions you have with your health care provider.  Document Released: 10/14/2010 Document Revised: 08/15/2017 Document Reviewed: 05/03/2016  [employer] Interactive Patient Education © 2019 [employer]  Fat and Cholesterol Restricted Eating Plan  Getting too much fat and cholesterol in your diet may cause health problems. Choosing the right foods helps keep your fat and cholesterol at normal levels. This can keep you from getting certain diseases.  Your doctor may recommend an eating plan that includes:  · Total fat: ______% or less of total calories a day.  · Saturated fat: ______% or less of total calories a  "day.  · Cholesterol: less than _________mg a day.  · Fiber: ______g a day.    What are tips for following this plan?  General tips  · Work with your doctor to lose weight if you need to.  · Avoid:  ? Foods with added sugar.  ? Fried foods.  ? Foods with partially hydrogenated oils.  · Limit alcohol intake to no more than 1 drink a day for nonpregnant women and 2 drinks a day for men. One drink equals 12 oz of beer, 5 oz of wine, or 1½ oz of hard liquor.  Reading food labels  · Check food labels for:  ? Trans fats.  ? Partially hydrogenated oils.  ? Saturated fat (g) in each serving.  ? Cholesterol (mg) in each serving.  ? Fiber (g) in each serving.  · Choose foods with healthy fats, such as:  ? Monounsaturated fats.  ? Polyunsaturated fats.  ? Omega-3 fats.  · Choose grain products that have whole grains. Look for the word \"whole\" as the first word in the ingredient list.  Cooking  · Cook foods using low-fat methods. These include baking, boiling, grilling, and broiling.  · Eat more home-cooked foods. Eat at restaurants and buffets less often.  · Avoid cooking using saturated fats, such as butter, cream, palm oil, palm kernel oil, and coconut oil.  Meal planning  · At meals, divide your plate into four equal parts:  ? Fill one-half of your plate with vegetables and green salads.  ? Fill one-fourth of your plate with whole grains.  ? Fill one-fourth of your plate with low-fat (lean) protein foods.  · Eat fish that is high in omega-3 fats at least two times a week. This includes mackerel, tuna, sardines, and salmon.  · Eat foods that are high in fiber, such as whole grains, beans, apples, broccoli, carrots, peas, and barley.  Recommended foods  Grains  · Whole grains, such as whole wheat or whole grain breads, crackers, cereals, and pasta. Unsweetened oatmeal, bulgur, barley, quinoa, or brown rice. Corn or whole wheat flour tortillas.  Vegetables  · Fresh or frozen vegetables (raw, steamed, roasted, or grilled). Green " salads.  Fruits  · All fresh, canned (in natural juice), or frozen fruits.  Meats and other protein foods  · Ground beef (85% or leaner), grass-fed beef, or beef trimmed of fat. Skinless chicken or turkey. Ground chicken or turkey. Pork trimmed of fat. All fish and seafood. Egg whites. Dried beans, peas, or lentils. Unsalted nuts or seeds. Unsalted canned beans. Nut butters without added sugar or oil.  Dairy  · Low-fat or nonfat dairy products, such as skim or 1% milk, 2% or reduced-fat cheeses, low-fat and fat-free ricotta or cottage cheese, or plain low-fat and nonfat yogurt.  Fats and oils  · Tub margarine without trans fats. Light or reduced-fat mayonnaise and salad dressings. Avocado. Olive, canola, sesame, or safflower oils.  The items listed above may not be a complete list of recommended foods or beverages. Contact your dietitian for more options.  Foods to avoid  Grains  · White bread. White pasta. White rice. Cornbread. Bagels, pastries, and croissants. Crackers and snack foods that contain trans fat and hydrogenated oils.  Vegetables  · Vegetables cooked in cheese, cream, or butter sauce. Fried vegetables.  Fruits  · Canned fruit in heavy syrup. Fruit in cream or butter sauce. Fried fruit.  Meats and other protein foods  · Fatty cuts of meat. Ribs, chicken wings, hatrmann, sausage, bologna, salami, chitterlings, fatback, hot dogs, bratwurst, and packaged lunch meats. Liver and organ meats. Whole eggs and egg yolks. Chicken and turkey with skin. Fried meat.  Dairy  · Whole or 2% milk, cream, half-and-half, and cream cheese. Whole milk cheeses. Whole-fat or sweetened yogurt. Full-fat cheeses. Nondairy creamers and whipped toppings. Processed cheese, cheese spreads, and cheese curds.  Beverages  · Alcohol. Sugar-sweetened drinks such as sodas, lemonade, and fruit drinks.  Fats and oils  · Butter, stick margarine, lard, shortening, ghee, or hartmann fat. Coconut, palm kernel, and palm oils.  Sweets and  desserts  · Corn syrup, sugars, honey, and molasses. Candy. Jam and jelly. Syrup. Sweetened cereals. Cookies, pies, cakes, donuts, muffins, and ice cream.  The items listed above may not be a complete list of foods and beverages to avoid. Contact your dietitian for more information.  Summary  · Choosing the right foods helps keep your fat and cholesterol at normal levels. This can keep you from getting certain diseases.  · At meals, fill one-half of your plate with vegetables and green salads.  · Eat high-fiber foods, like whole grains, beans, apples, carrots, peas, and barley.  · Limit added sugar, saturated fats, alcohol, and fried foods.  This information is not intended to replace advice given to you by your health care provider. Make sure you discuss any questions you have with your health care provider.  Document Released: 06/18/2013 Document Revised: 09/04/2018 Document Reviewed: 09/04/2018  [employer] Interactive Patient Education © 2019 [employer]  BMI for Adults  Body mass index (BMI) is a number that is calculated from a person's weight and height. In most adults, the number is used to find how much of an adult's weight is made up of fat. BMI is not as accurate as a direct measure of body fat.  How is BMI calculated?  BMI is calculated by dividing weight in kilograms by height in meters squared. It can also be calculated by dividing weight in pounds by height in inches squared, then multiplying the resulting number by 703. Charts are available to help you find your BMI quickly and easily without doing this calculation.  How is BMI interpreted?  Health care professionals use BMI charts to identify whether an adult is underweight, at a normal weight, or overweight based on the following guidelines:  · Underweight: BMI less than 18.5.  · Normal weight: BMI between 18.5 and 24.9.  · Overweight: BMI between 25 and 29.9.  · Obese: BMI of 30 and above.    BMI is usually interpreted the same for males and  females.  Weight includes both fat and muscle, so someone with a muscular build, such as an athlete, may have a BMI that is higher than 24.9. In cases like these, BMI may not accurately depict body fat. To determine if excess body fat is the cause of a BMI of 25 or higher, further assessments may need to be done by a health care provider.  Why is BMI a useful tool?  BMI is used to identify a possible weight problem that may be related to a medical problem or may increase the risk for medical problems. BMI can also be used to promote changes to reach a healthy weight.  This information is not intended to replace advice given to you by your health care provider. Make sure you discuss any questions you have with your health care provider.  Document Released: 08/29/2005 Document Revised: 04/27/2017 Document Reviewed: 05/15/2015  [employer] Interactive Patient Education © 2018 [employer]

## 2019-05-29 NOTE — PROGRESS NOTES
"Subjective   Cassy A May is a 73 y.o. female     Chief Complaint   Patient presents with   • Follow-up     Here for a 6 month follow up        HPI    PROBLEM LIST:     1. CAD.  1.1 Cardiac Cath. \"Failed\" PCI of the RCA. [facility]. Winter 2014- Intraprocedural closure of vessel. ILUMIEN-1 stent mid-LAD, 01/20/2014. [place], [place].  1.2 Nuclear stress test 8-7-18 with lateral wall ischemia  1.3 left heart catheter 11/16/18-2 stents to the right coronary artery, 30-50% ostial narrowing of the circumflex, EF 50-55%, left ventricular end of blood pressure 14  2. Shortness of Breath.  2.1 Echo 8-7-18 with Ef > 65%, mind DD, trace MR, trace TR, pulm. Pressures 30-35 mmHg, no pericardial effusion  3. Hyperlipidemia.   4.  Smoking habituation    Patient is a 73-year-old female who presents today for a follow-up.  She denies any chest pain, pressure, dizziness, presyncope, syncope, orthopnea, PND or edema.  She says she does get lightheaded when she bends over.  She says she has an occasional flutter but she says that it is really hard for her describe is not any type of discomfort but she says it only happened maybe once or twice in the last month since she was here.  She says it usually occurs when she exerts more.  She says she does get short of breath with increased activity and this is not changed.  PCP monitors her cholesterol.  Patient still smokes about a pack a day.    Current Outpatient Medications   Medication Sig Dispense Refill   • amLODIPine (NORVASC) 5 MG tablet Take 1 tablet by mouth Daily. 90 tablet 3   • aspirin 81 MG tablet Take 1 tablet by mouth Daily. 30 tablet 11   • clopidogrel (PLAVIX) 75 MG tablet Take 1 tablet by mouth Daily. 90 tablet 2   • Coenzyme Q10 (CO Q-10) 100 MG capsule Take 100 mg by mouth 2 (Two) Times a Day. (Patient taking differently: Take 1 capsule by mouth Daily.) 60 capsule 0   • lisinopril (PRINIVIL,ZESTRIL) 40 MG tablet Take 1 tablet by mouth Daily. 30 tablet 11   • " nitroglycerin (NITROSTAT) 0.4 MG SL tablet Place 1 tablet under the tongue Every 5 (Five) Minutes As Needed for Chest Pain. Take no more than 3 doses in 15 minutes. 25 tablet 6   • Pseudoephedrine-Guaifenesin (MUCINEX D PO) Take 1 tablet by mouth Daily.     • rosuvastatin (CRESTOR) 20 MG tablet Take 1 tablet by mouth Daily. 30 tablet 11     No current facility-administered medications for this visit.        ALLERGIES    Patient has no known allergies.    Past Medical History:   Diagnosis Date   • Coronary artery disease    • Hyperlipidemia    • Hypertension        Social History     Socioeconomic History   • Marital status:      Spouse name: Not on file   • Number of children: Not on file   • Years of education: Not on file   • Highest education level: Not on file   Tobacco Use   • Smoking status: Current Every Day Smoker     Packs/day: 1.50     Years: 50.00     Pack years: 75.00     Types: Cigarettes   • Smokeless tobacco: Never Used   Substance and Sexual Activity   • Alcohol use: No   • Drug use: No   • Sexual activity: Defer       Family History   Problem Relation Age of Onset   • Hypertension Mother    • Heart disease Mother    • Diabetes Mother    • Heart attack Father        Review of Systems   Constitutional: Negative for chills, fatigue and fever.   HENT: Positive for postnasal drip. Negative for congestion, rhinorrhea and sore throat.    Eyes: Negative for visual disturbance.   Respiratory: Positive for cough (Productive cough ) and shortness of breath (with increased activity ). Negative for chest tightness.    Cardiovascular: Positive for palpitations (Occasional flutters; just feel something going on, maybe once or twice in months; when exert self more). Negative for chest pain and leg swelling.   Gastrointestinal: Negative for abdominal pain, blood in stool, nausea and vomiting.   Endocrine: Negative for cold intolerance and heat intolerance.   Genitourinary: Negative for dysuria, frequency,  "hematuria and urgency.   Musculoskeletal: Positive for back pain (Lower back pain ). Negative for arthralgias and neck pain.   Skin: Negative for rash and wound.   Allergic/Immunologic: Negative for environmental allergies and food allergies.   Neurological: Positive for light-headedness (when bending over ). Negative for dizziness and weakness.   Hematological: Bruises/bleeds easily (Bruises and bleeds easily ).   Psychiatric/Behavioral: Negative for sleep disturbance (Denies waking with smothering or SOA).       Objective   /68 (BP Location: Left arm, Patient Position: Sitting)   Pulse 84   Ht 157.5 cm (62\")   Wt 77.2 kg (170 lb 3.2 oz)   SpO2 95%   BMI 31.13 kg/m²   Vitals:    05/29/19 1330   BP: 111/68   BP Location: Left arm   Patient Position: Sitting   Pulse: 84   SpO2: 95%   Weight: 77.2 kg (170 lb 3.2 oz)   Height: 157.5 cm (62\")      Lab Results (most recent)     None        Physical Exam   Constitutional: She is oriented to person, place, and time. Vital signs are normal. She appears well-developed and well-nourished. She is active and cooperative.   HENT:   Head: Normocephalic.   Eyes: Lids are normal.   Neck: Normal carotid pulses, no hepatojugular reflux and no JVD present. Carotid bruit is not present.   Cardiovascular: Normal rate, regular rhythm and normal heart sounds.   Pulses:       Radial pulses are 2+ on the right side, and 2+ on the left side.        Dorsalis pedis pulses are 2+ on the right side, and 2+ on the left side.        Posterior tibial pulses are 2+ on the right side, and 2+ on the left side.   No edema BLE.    Pulmonary/Chest: Effort normal and breath sounds normal.   Abdominal: Normal appearance and bowel sounds are normal.   Neurological: She is alert and oriented to person, place, and time.   Skin: Skin is warm, dry and intact. Bruising noted.   Psychiatric: She has a normal mood and affect. Her speech is normal and behavior is normal. Judgment and thought content " normal. Cognition and memory are normal.       Procedure   Procedures         Assessment/Plan      Diagnosis Plan   1. Coronary artery disease involving native coronary artery of native heart with angina pectoris (CMS/HCC)  clopidogrel (PLAVIX) 75 MG tablet    amLODIPine (NORVASC) 5 MG tablet   2. Essential hypertension  amLODIPine (NORVASC) 5 MG tablet   3. Mixed hyperlipidemia  amLODIPine (NORVASC) 5 MG tablet   4. Shortness of breath  amLODIPine (NORVASC) 5 MG tablet   5. Smoking     6. Precordial pain  amLODIPine (NORVASC) 5 MG tablet       Return in about 6 months (around 11/29/2019).  CAD-patient is on aspirin, Plavix and statin.  Hypertension-patient doing well on amlodipine.  Hyperlipidemia-patient is on Crestor monitored by PCP.  Shortness of breath-stable.  Chest pain-again just refill patient's amlodipine.  She will continue her medication regimen.  She will follow-up in 6 months or sooner if any changes.         I advised Cassy of the risks of continuing to use tobacco, and I provided her with tobacco cessation educational materials in the After Visit Summary.     During this visit, I spent >3 minutes counseling the patient regarding tobacco cessation.    Patient's Body mass index is 31.13 kg/m². BMI is above normal parameters. Recommendations include: educational material and referral to primary care.      Electronically signed by:

## 2019-11-17 DIAGNOSIS — I10 ESSENTIAL HYPERTENSION: ICD-10-CM

## 2019-11-18 RX ORDER — LISINOPRIL 40 MG/1
TABLET ORAL
Qty: 30 TABLET | Refills: 6 | Status: SHIPPED | OUTPATIENT
Start: 2019-11-18 | End: 2020-05-15

## 2019-12-02 ENCOUNTER — TELEPHONE (OUTPATIENT)
Dept: CARDIOLOGY | Facility: CLINIC | Age: 74
End: 2019-12-02

## 2019-12-02 DIAGNOSIS — I25.119 CORONARY ARTERY DISEASE INVOLVING NATIVE CORONARY ARTERY OF NATIVE HEART WITH ANGINA PECTORIS (HCC): ICD-10-CM

## 2019-12-02 RX ORDER — CLOPIDOGREL BISULFATE 75 MG/1
75 TABLET ORAL DAILY
Qty: 90 TABLET | Refills: 1 | Status: SHIPPED | OUTPATIENT
Start: 2019-12-02 | End: 2020-01-24

## 2019-12-02 NOTE — TELEPHONE ENCOUNTER
REFILLS ELECTRO'D AS REQUESTED. CANDIS BUTTERFIELD          ----- Message from Barak Brooks sent at 12/2/2019  9:18 AM EST -----  Pt needs refills.    Medication: CLOPIDOGREL 75 MG 90 DAY SUPPLY    Pharmacy:[facility]

## 2020-01-02 DIAGNOSIS — R06.02 SHORTNESS OF BREATH: ICD-10-CM

## 2020-01-02 DIAGNOSIS — E78.2 MIXED HYPERLIPIDEMIA: ICD-10-CM

## 2020-01-02 DIAGNOSIS — I25.119 CORONARY ARTERY DISEASE INVOLVING NATIVE CORONARY ARTERY OF NATIVE HEART WITH ANGINA PECTORIS (HCC): ICD-10-CM

## 2020-01-02 DIAGNOSIS — R07.2 PRECORDIAL PAIN: ICD-10-CM

## 2020-01-02 RX ORDER — ROSUVASTATIN CALCIUM 20 MG/1
20 TABLET, COATED ORAL DAILY
Qty: 30 TABLET | Refills: 11 | Status: SHIPPED | OUTPATIENT
Start: 2020-01-02 | End: 2020-01-24 | Stop reason: SDUPTHER

## 2020-01-24 ENCOUNTER — OFFICE VISIT (OUTPATIENT)
Dept: CARDIOLOGY | Facility: CLINIC | Age: 75
End: 2020-01-24

## 2020-01-24 VITALS
HEART RATE: 75 BPM | HEIGHT: 62 IN | DIASTOLIC BLOOD PRESSURE: 92 MMHG | OXYGEN SATURATION: 95 % | BODY MASS INDEX: 30.55 KG/M2 | SYSTOLIC BLOOD PRESSURE: 150 MMHG | WEIGHT: 166 LBS

## 2020-01-24 DIAGNOSIS — I25.119 CORONARY ARTERY DISEASE INVOLVING NATIVE CORONARY ARTERY OF NATIVE HEART WITH ANGINA PECTORIS (HCC): ICD-10-CM

## 2020-01-24 DIAGNOSIS — R60.9 PERIPHERAL EDEMA: ICD-10-CM

## 2020-01-24 DIAGNOSIS — R94.39 ABNORMAL STRESS TEST: ICD-10-CM

## 2020-01-24 DIAGNOSIS — R07.2 PRECORDIAL PAIN: ICD-10-CM

## 2020-01-24 DIAGNOSIS — E78.2 MIXED HYPERLIPIDEMIA: ICD-10-CM

## 2020-01-24 DIAGNOSIS — I10 ESSENTIAL HYPERTENSION: Primary | ICD-10-CM

## 2020-01-24 DIAGNOSIS — F17.200 SMOKING: ICD-10-CM

## 2020-01-24 DIAGNOSIS — R09.89 DECREASED PEDAL PULSES: ICD-10-CM

## 2020-01-24 DIAGNOSIS — Z00.00 HEALTHCARE MAINTENANCE: ICD-10-CM

## 2020-01-24 DIAGNOSIS — R06.02 SHORTNESS OF BREATH: ICD-10-CM

## 2020-01-24 PROBLEM — S42.402A ELBOW FRACTURE, LEFT: Status: ACTIVE | Noted: 2020-01-24

## 2020-01-24 PROBLEM — R60.0 PERIPHERAL EDEMA: Status: ACTIVE | Noted: 2020-01-24

## 2020-01-24 PROCEDURE — 93000 ELECTROCARDIOGRAM COMPLETE: CPT | Performed by: NURSE PRACTITIONER

## 2020-01-24 PROCEDURE — 99214 OFFICE O/P EST MOD 30 MIN: CPT | Performed by: NURSE PRACTITIONER

## 2020-01-24 RX ORDER — AMLODIPINE BESYLATE 5 MG/1
5 TABLET ORAL DAILY
Qty: 90 TABLET | Refills: 3 | Status: SHIPPED | OUTPATIENT
Start: 2020-01-24 | End: 2021-01-08 | Stop reason: SDUPTHER

## 2020-01-24 RX ORDER — FUROSEMIDE 20 MG/1
20 TABLET ORAL DAILY PRN
Qty: 90 TABLET | Refills: 3 | Status: SHIPPED | OUTPATIENT
Start: 2020-01-24 | End: 2021-01-08 | Stop reason: SDUPTHER

## 2020-01-24 RX ORDER — ROSUVASTATIN CALCIUM 20 MG/1
20 TABLET, COATED ORAL DAILY
Qty: 90 TABLET | Refills: 3 | Status: SHIPPED | OUTPATIENT
Start: 2020-01-24 | End: 2021-01-08 | Stop reason: SDUPTHER

## 2020-01-24 NOTE — PROGRESS NOTES
"Subjective   Cassy A May is a 74 y.o. female     Chief Complaint   Patient presents with   • Follow-up   • Coronary Artery Disease       HPI    PROBLEM LIST:     1. CAD.  1.1 Cardiac Cath. \"Failed\" PCI of the RCA. [facility]. Winter 2014- Intraprocedural closure of vessel. ILUMIEN-1 stent mid-LAD, 01/20/2014. [place], [place].  1.2 Nuclear stress test 8-7-18 with lateral wall ischemia  1.3 left heart catheter 11/16/18-2 stents to the right coronary artery, 30-50% ostial narrowing of the circumflex, EF 50-55%, left ventricular end of blood pressure 14  2. Shortness of Breath.  2.1 Echo 8-7-18 with Ef > 65%, mind DD, trace MR, trace TR, pulm. Pressures 30-35 mmHg, no pericardial effusion  3. Hyperlipidemia.   4.  Smoking habituation    Patient is a 74-year-old female who presents today for a follow-up.  She denies any chest pain, pressure, palpitations, fluttering, dizziness, presyncope, syncope, orthopnea or PND.  She says she has been having some swelling in her legs.  She says is just started here recently.  She says also she has noted that her blood pressure has been up as well.  She did go to her PCP who gave her hydrochlorothiazide but that elevated her creatinine so they told her to stop it.  She also has very discolored feet today they both have a purplish tint to them.  She says her shortness of breath is about the same no real change she has it with activity.  She states fatigue she does fine she just trying to get back into the swing of things since breaking her elbow back in November and has been going to therapy 3 times a week since then.  PCP monitors her cholesterol.  Patient is still smoking about a pack and a half a day.    Current Outpatient Medications on File Prior to Visit   Medication Sig Dispense Refill   • Coenzyme Q10 (CO Q-10) 100 MG capsule Take 100 mg by mouth 2 (Two) Times a Day. (Patient taking differently: Take 1 capsule by mouth Daily.) 60 capsule 0   • lisinopril " (PRINIVIL,ZESTRIL) 40 MG tablet TAKE 1 TABLET BY MOUTH ONCE DAILY 30 tablet 6   • nitroglycerin (NITROSTAT) 0.4 MG SL tablet Place 1 tablet under the tongue Every 5 (Five) Minutes As Needed for Chest Pain. Take no more than 3 doses in 15 minutes. 25 tablet 6   • Pseudoephedrine-Guaifenesin (MUCINEX D PO) Take 1 tablet by mouth Daily.     • [DISCONTINUED] amLODIPine (NORVASC) 5 MG tablet Take 1 tablet by mouth Daily. 90 tablet 3   • [DISCONTINUED] aspirin 81 MG tablet Take 1 tablet by mouth Daily. 30 tablet 11   • [DISCONTINUED] clopidogrel (PLAVIX) 75 MG tablet Take 1 tablet by mouth Daily. 90 tablet 1   • [DISCONTINUED] rosuvastatin (CRESTOR) 20 MG tablet Take 1 tablet by mouth Daily. 30 tablet 11     No current facility-administered medications on file prior to visit.        ALLERGIES    Patient has no known allergies.    Past Medical History:   Diagnosis Date   • Coronary artery disease    • Hyperlipidemia    • Hypertension        Social History     Socioeconomic History   • Marital status:      Spouse name: Not on file   • Number of children: Not on file   • Years of education: Not on file   • Highest education level: Not on file   Tobacco Use   • Smoking status: Current Every Day Smoker     Packs/day: 1.50     Years: 50.00     Pack years: 75.00     Types: Cigarettes   • Smokeless tobacco: Never Used   Substance and Sexual Activity   • Alcohol use: No   • Drug use: No   • Sexual activity: Defer       Family History   Problem Relation Age of Onset   • Hypertension Mother    • Heart disease Mother    • Diabetes Mother    • Heart attack Father        Review of Systems   Constitutional: Positive for fatigue. Negative for diaphoresis.   HENT: Positive for rhinorrhea. Negative for congestion and sore throat.    Eyes: Positive for visual disturbance (reading glasses).   Respiratory: Positive for shortness of breath (w/ normal daily activity; no change). Negative for chest tightness.    Cardiovascular: Positive  "for leg swelling (BLE edema, mainly LLE, sometimes goes down overnight ). Negative for chest pain (Denies CP) and palpitations.   Gastrointestinal: Negative for abdominal pain, blood in stool, constipation, diarrhea, nausea and vomiting.   Endocrine: Negative for cold intolerance and heat intolerance.   Genitourinary: Negative for difficulty urinating, dysuria, frequency, hematuria and urgency.   Musculoskeletal: Positive for back pain. Negative for arthralgias and neck pain.        Pt c/o pain in left elbow; [employer] fx moving a tree stand 11/16/19      Skin: Negative for rash and wound.   Allergic/Immunologic: Negative for environmental allergies and food allergies.   Neurological: Negative for dizziness, syncope, weakness, light-headedness, numbness and headaches.   Hematological: Bruises/bleeds easily (both).   Psychiatric/Behavioral: Negative for sleep disturbance.       Objective   /92   Pulse 75   Ht 157.5 cm (62\")   Wt 75.3 kg (166 lb)   SpO2 95%   BMI 30.36 kg/m²   Vitals:    01/24/20 0754   BP: 150/92   Pulse: 75   SpO2: 95%   Weight: 75.3 kg (166 lb)   Height: 157.5 cm (62\")      Lab Results (most recent)     None        Physical Exam   Constitutional: She is oriented to person, place, and time. Vital signs are normal. She appears well-developed and well-nourished. She is active and cooperative.   HENT:   Head: Normocephalic.   Eyes: Lids are normal.   Neck: Normal carotid pulses, no hepatojugular reflux and no JVD present. Carotid bruit is not present.   Cardiovascular: Normal rate, regular rhythm and normal heart sounds.   Pulses:       Radial pulses are 2+ on the right side, and 2+ on the left side.   Decreased pedal pulses; 1+ edema BLE.   Pulmonary/Chest: Effort normal and breath sounds normal.   Abdominal: Normal appearance and bowel sounds are normal.   Neurological: She is alert and oriented to person, place, and time.   Skin: Skin is warm, dry and intact.   Psychiatric: She has a normal " mood and affect. Her speech is normal and behavior is normal. Judgment and thought content normal. Cognition and memory are normal.       Procedure     ECG 12 Lead  Date/Time: 1/24/2020 9:04 AM  Performed by: Radha Carranza APRN  Authorized by: Radha Carranza APRN   Comparison: compared with previous ECG from 7/12/2018  Similar to previous ECG  Rhythm: sinus rhythm  Rate: normal  BPM: 67  Conduction: right bundle branch block  QRS axis: right    Clinical impression: abnormal EKG                 Assessment/Plan      Diagnosis Plan   1. Essential hypertension  amLODIPine (NORVASC) 5 MG tablet   2. Coronary artery disease involving native coronary artery of native heart with angina pectoris (CMS/HCC)  amLODIPine (NORVASC) 5 MG tablet    rosuvastatin (CRESTOR) 20 MG tablet    aspirin 81 MG tablet   3. Mixed hyperlipidemia  amLODIPine (NORVASC) 5 MG tablet    rosuvastatin (CRESTOR) 20 MG tablet   4. Shortness of breath  amLODIPine (NORVASC) 5 MG tablet    rosuvastatin (CRESTOR) 20 MG tablet   5. Smoking     6. Peripheral edema  Duplex Lower Extremity Art / Grafts - Bilateral CAR    furosemide (LASIX) 20 MG tablet   7. Decreased pedal pulses  Duplex Lower Extremity Art / Grafts - Bilateral CAR   8. Precordial pain  amLODIPine (NORVASC) 5 MG tablet    rosuvastatin (CRESTOR) 20 MG tablet    aspirin 81 MG tablet   9. Abnormal stress test  aspirin 81 MG tablet       Return in about 6 months (around 7/24/2020).    Hypertension-patient is on amlodipine and lisinopril.  Her blood pressure is elevated but she does have some swelling so on going to give her diuretic to use as needed.  Hyperlipidemia-patient is on Crestor.  Shortness of breath-stable.  Smoking-encourage cessation.  Peripheral edema/decreased pedal pulses-patient was Lasix as needed and have bilateral lower extremity arterial ultrasound.  Chest pain-patient has not had any I just refilled her medications.  She can actually stop her Plavix.  She will continue her  medication regimen otherwise.  She will follow-up in 6 months or sooner depending on testing results or any changes.       Cassy SARA May  reports that she has been smoking cigarettes. She has a 75.00 pack-year smoking history. She has never used smokeless tobacco.. I have educated her on the risk of diseases from using tobacco products such as cancer, COPD and heart diease.     I advised her to quit and she is not willing to quit.         Patient's Body mass index is 30.36 kg/m². BMI is above normal parameters. Recommendations include: educational material.      Electronically signed by:

## 2020-01-24 NOTE — PATIENT INSTRUCTIONS
Steps to Quit Smoking    Smoking tobacco can be bad for your health. It can also affect almost every organ in your body. Smoking puts you and people around you at risk for many serious long-lasting (chronic) diseases. Quitting smoking is hard, but it is one of the best things that you can do for your health. It is never too late to quit.  What are the benefits of quitting smoking?  When you quit smoking, you lower your risk for getting serious diseases and conditions. They can include:  · Lung cancer or lung disease.  · Heart disease.  · Stroke.  · Heart attack.  · Not being able to have children (infertility).  · Weak bones (osteoporosis) and broken bones (fractures).  If you have coughing, wheezing, and shortness of breath, those symptoms may get better when you quit. You may also get sick less often. If you are pregnant, quitting smoking can help to lower your chances of having a baby of low birth weight.  What can I do to help me quit smoking?  Talk with your doctor about what can help you quit smoking. Some things you can do (strategies) include:  · Quitting smoking totally, instead of slowly cutting back how much you smoke over a period of time.  · Going to in-person counseling. You are more likely to quit if you go to many counseling sessions.  · Using resources and support systems, such as:  ? Online chats with a counselor.  ? Phone quitlines.  ? Printed self-help materials.  ? Support groups or group counseling.  ? Text messaging programs.  ? Mobile phone apps or applications.  · Taking medicines. Some of these medicines may have nicotine in them. If you are pregnant or breastfeeding, do not take any medicines to quit smoking unless your doctor says it is okay. Talk with your doctor about counseling or other things that can help you.  Talk with your doctor about using more than one strategy at the same time, such as taking medicines while you are also going to in-person counseling. This can help make  quitting easier.  What things can I do to make it easier to quit?  Quitting smoking might feel very hard at first, but there is a lot that you can do to make it easier. Take these steps:  · Talk to your family and friends. Ask them to support and encourage you.  · Call phone quitlines, reach out to support groups, or work with a counselor.  · Ask people who smoke to not smoke around you.  · Avoid places that make you want (trigger) to smoke, such as:  ? Bars.  ? Parties.  ? Smoke-break areas at work.  · Spend time with people who do not smoke.  · Lower the stress in your life. Stress can make you want to smoke. Try these things to help your stress:  ? Getting regular exercise.  ? Deep-breathing exercises.  ? Yoga.  ? Meditating.  ? Doing a body scan. To do this, close your eyes, focus on one area of your body at a time from head to toe, and notice which parts of your body are tense. Try to relax the muscles in those areas.  · Download or buy apps on your mobile phone or tablet that can help you stick to your quit plan. There are many free apps, such as QuitGuide from the CDC (Centers for Disease Control and Prevention). You can find more support from smokefree.gov and other websites.  This information is not intended to replace advice given to you by your health care provider. Make sure you discuss any questions you have with your health care provider.  Document Released: 10/14/2010 Document Revised: 08/15/2017 Document Reviewed: 05/03/2016  [employer] Interactive Patient Education © 2019 [employer]  Fat and Cholesterol Restricted Eating Plan  Getting too much fat and cholesterol in your diet may cause health problems. Choosing the right foods helps keep your fat and cholesterol at normal levels. This can keep you from getting certain diseases.  Your doctor may recommend an eating plan that includes:  · Total fat: ______% or less of total calories a day.  · Saturated fat: ______% or less of total calories a  "day.  · Cholesterol: less than _________mg a day.  · Fiber: ______g a day.  What are tips for following this plan?  Meal planning  · At meals, divide your plate into four equal parts:  ? Fill one-half of your plate with vegetables and green salads.  ? Fill one-fourth of your plate with whole grains.  ? Fill one-fourth of your plate with low-fat (lean) protein foods.  · Eat fish that is high in omega-3 fats at least two times a week. This includes mackerel, tuna, sardines, and salmon.  · Eat foods that are high in fiber, such as whole grains, beans, apples, broccoli, carrots, peas, and barley.  General tips    · Work with your doctor to lose weight if you need to.  · Avoid:  ? Foods with added sugar.  ? Fried foods.  ? Foods with partially hydrogenated oils.  · Limit alcohol intake to no more than 1 drink a day for nonpregnant women and 2 drinks a day for men. One drink equals 12 oz of beer, 5 oz of wine, or 1½ oz of hard liquor.  Reading food labels  · Check food labels for:  ? Trans fats.  ? Partially hydrogenated oils.  ? Saturated fat (g) in each serving.  ? Cholesterol (mg) in each serving.  ? Fiber (g) in each serving.  · Choose foods with healthy fats, such as:  ? Monounsaturated fats.  ? Polyunsaturated fats.  ? Omega-3 fats.  · Choose grain products that have whole grains. Look for the word \"whole\" as the first word in the ingredient list.  Cooking  · Cook foods using low-fat methods. These include baking, boiling, grilling, and broiling.  · Eat more home-cooked foods. Eat at restaurants and buffets less often.  · Avoid cooking using saturated fats, such as butter, cream, palm oil, palm kernel oil, and coconut oil.  Recommended foods    Fruits  · All fresh, canned (in natural juice), or frozen fruits.  Vegetables  · Fresh or frozen vegetables (raw, steamed, roasted, or grilled). Green salads.  Grains  · Whole grains, such as whole wheat or whole grain breads, crackers, cereals, and pasta. Unsweetened " oatmeal, bulgur, barley, quinoa, or brown rice. Corn or whole wheat flour tortillas.  Meats and other protein foods  · Ground beef (85% or leaner), grass-fed beef, or beef trimmed of fat. Skinless chicken or turkey. Ground chicken or turkey. Pork trimmed of fat. All fish and seafood. Egg whites. Dried beans, peas, or lentils. Unsalted nuts or seeds. Unsalted canned beans. Nut butters without added sugar or oil.  Dairy  · Low-fat or nonfat dairy products, such as skim or 1% milk, 2% or reduced-fat cheeses, low-fat and fat-free ricotta or cottage cheese, or plain low-fat and nonfat yogurt.  Fats and oils  · Tub margarine without trans fats. Light or reduced-fat mayonnaise and salad dressings. Avocado. Olive, canola, sesame, or safflower oils.  The items listed above may not be a complete list of foods and beverages you can eat. Contact a dietitian for more information.  Foods to avoid  Fruits  · Canned fruit in heavy syrup. Fruit in cream or butter sauce. Fried fruit.  Vegetables  · Vegetables cooked in cheese, cream, or butter sauce. Fried vegetables.  Grains  · White bread. White pasta. White rice. Cornbread. Bagels, pastries, and croissants. Crackers and snack foods that contain trans fat and hydrogenated oils.  Meats and other protein foods  · Fatty cuts of meat. Ribs, chicken wings, hartmann, sausage, bologna, salami, chitterlings, fatback, hot dogs, bratwurst, and packaged lunch meats. Liver and organ meats. Whole eggs and egg yolks. Chicken and turkey with skin. Fried meat.  Dairy  · Whole or 2% milk, cream, half-and-half, and cream cheese. Whole milk cheeses. Whole-fat or sweetened yogurt. Full-fat cheeses. Nondairy creamers and whipped toppings. Processed cheese, cheese spreads, and cheese curds.  Beverages  · Alcohol. Sugar-sweetened drinks such as sodas, lemonade, and fruit drinks.  Fats and oils  · Butter, stick margarine, lard, shortening, ghee, or hartmann fat. Coconut, palm kernel, and palm oils.  Sweets and  desserts  · Corn syrup, sugars, honey, and molasses. Candy. Jam and jelly. Syrup. Sweetened cereals. Cookies, pies, cakes, donuts, muffins, and ice cream.  The items listed above may not be a complete list of foods and beverages you should avoid. Contact a dietitian for more information.  Summary  · Choosing the right foods helps keep your fat and cholesterol at normal levels. This can keep you from getting certain diseases.  · At meals, fill one-half of your plate with vegetables and green salads.  · Eat high-fiber foods, like whole grains, beans, apples, carrots, peas, and barley.  · Limit added sugar, saturated fats, alcohol, and fried foods.  This information is not intended to replace advice given to you by your health care provider. Make sure you discuss any questions you have with your health care provider.  Document Released: 06/18/2013 Document Revised: 08/21/2019 Document Reviewed: 09/04/2018  [employer] Interactive Patient Education © 2019 [employer]  BMI for Adults    Body mass index (BMI) is a number that is calculated from a person's weight and height. BMI may help to estimate how much of a person's weight is composed of fat. BMI can help identify those who may be at higher risk for certain medical problems.  How is BMI used with adults?  BMI is used as a screening tool to identify possible weight problems. It is used to check whether a person is obese, overweight, healthy weight, or underweight.  How is BMI calculated?  BMI measures your weight and compares it to your height. This can be done either in English ([place] or metric measurements. Note that charts are available to help you find your BMI quickly and easily without having to do these calculations yourself.  To calculate your BMI in English ([place] measurements, your health care provider will:  1. Measure your weight in pounds (lb).  2. Multiply the number of pounds by 703.  ? For example, for a person who weighs 180 lb, multiply that number by  "703, which equals 126,540.  3. Measure your height in inches (in). Then multiply that number by itself to get a measurement called \"inches squared.\"  ? For example, for a person who is 70 in tall, the \"inches squared\" measurement is 70 in x 70 in, which equals 4900 inches squared.  4. Divide the total from Step 2 (number of lb x 703) by the total from Step 3 (inches squared): 126,540 ÷ 4900 = 25.8. This is your BMI.  To calculate your BMI in metric measurements, your health care provider will:  1. Measure your weight in kilograms (kg).  2. Measure your height in meters (m). Then multiply that number by itself to get a measurement called \"meters squared.\"  ? For example, for a person who is 1.75 m tall, the \"meters squared\" measurement is 1.75 m x 1.75 m, which is equal to 3.1 meters squared.  3. Divide the number of kilograms (your weight) by the meters squared number. In this example: 70 ÷ 3.1 = 22.6. This is your BMI.  How is BMI interpreted?  To interpret your results, your health care provider will use BMI charts to identify whether you are underweight, normal weight, overweight, or obese. The following guidelines will be used:  · Underweight: BMI less than 18.5.  · Normal weight: BMI between 18.5 and 24.9.  · Overweight: BMI between 25 and 29.9.  · Obese: BMI of 30 and above.  Please note:  · Weight includes both fat and muscle, so someone with a muscular build, such as an athlete, may have a BMI that is higher than 24.9. In cases like these, BMI is not an accurate measure of body fat.  · To determine if excess body fat is the cause of a BMI of 25 or higher, further assessments may need to be done by a health care provider.  · BMI is usually interpreted in the same way for men and women.  Why is BMI a useful tool?  BMI is useful in two ways:  · Identifying a weight problem that may be related to a medical condition, or that may increase the risk for medical problems.  · Promoting lifestyle and diet changes in " order to reach a healthy weight.  Summary  · Body mass index (BMI) is a number that is calculated from a person's weight and height.  · BMI may help to estimate how much of a person's weight is composed of fat. BMI can help identify those who may be at higher risk for certain medical problems.  · BMI can be measured using English measurements or metric measurements.  · To interpret your results, your health care provider will use BMI charts to identify whether you are underweight, normal weight, overweight, or obese.  This information is not intended to replace advice given to you by your health care provider. Make sure you discuss any questions you have with your health care provider.  Document Released: 08/29/2005 Document Revised: 10/31/2018 Document Reviewed: 10/31/2018  [employer] Interactive Patient Education © 2019 [employer]    Edema    Edema is an abnormal buildup of fluids in the body tissues and under the skin. Swelling of the legs, feet, and ankles is a common symptom that becomes more likely as you get older. Swelling is also common in looser tissues, like around the eyes. When the affected area is squeezed, the fluid may move out of that spot and leave a dent for a few moments. This dent is called pitting edema.  There are many possible causes of edema. Eating too much salt (sodium) and being on your feet or sitting for a long time can cause edema in your legs, feet, and ankles. Hot weather may make edema worse. Common causes of edema include:  · Heart failure.  · Liver or kidney disease.  · Weak leg blood vessels.  · Cancer.  · An injury.  · Pregnancy.  · Medicines.  · Being obese.  · Low protein levels in the blood.  Edema is usually painless. Your skin may look swollen or shiny.  Follow these instructions at home:  · Keep the affected body part raised (elevated) above the level of your heart when you are sitting or lying down.  · Do not sit still or stand for long periods of time.  · Do not wear  tight clothing. Do not wear garters on your upper legs.  · Exercise your legs to get your circulation going. This helps to move the fluid back into your blood vessels, and it may help the swelling go down.  · Wear elastic bandages or support stockings to reduce swelling as told by your health care provider.  · Eat a low-salt (low-sodium) diet to reduce fluid as told by your health care provider.  · Depending on the cause of your swelling, you may need to limit how much fluid you drink (fluid restriction).  · Take over-the-counter and prescription medicines only as told by your health care provider.  Contact a health care provider if:  · Your edema does not get better with treatment.  · You have heart, liver, or kidney disease and have symptoms of edema.  · You have sudden and unexplained weight gain.  Get help right away if:  · You develop shortness of breath or chest pain.  · You cannot breathe when you lie down.  · You develop pain, redness, or warmth in the swollen areas.  · You have heart, liver, or kidney disease and suddenly get edema.  · You have a fever and your symptoms suddenly get worse.  Summary  · Edema is an abnormal buildup of fluids in the body tissues and under the skin.  · Eating too much salt (sodium) and being on your feet or sitting for a long time can cause edema in your legs, feet, and ankles.  · Keep the affected body part raised (elevated) above the level of your heart when you are sitting or lying down.  This information is not intended to replace advice given to you by your health care provider. Make sure you discuss any questions you have with your health care provider.  Document Released: 12/18/2006 Document Revised: 01/20/2018 Document Reviewed: 01/20/2018  [employer] Interactive Patient Education © 2019 [employer]

## 2020-02-04 ENCOUNTER — HOSPITAL ENCOUNTER (OUTPATIENT)
Dept: CARDIOLOGY | Facility: HOSPITAL | Age: 75
Discharge: HOME | End: 2020-02-04
Admitting: NURSE PRACTITIONER

## 2020-02-04 DIAGNOSIS — R09.89 DECREASED PEDAL PULSES: ICD-10-CM

## 2020-02-04 DIAGNOSIS — R60.9 PERIPHERAL EDEMA: ICD-10-CM

## 2020-02-04 PROCEDURE — 93925 LOWER EXTREMITY STUDY: CPT | Performed by: INTERNAL MEDICINE

## 2020-02-04 PROCEDURE — 93925 LOWER EXTREMITY STUDY: CPT

## 2020-02-18 ENCOUNTER — TELEPHONE (OUTPATIENT)
Dept: CARDIOLOGY | Facility: CLINIC | Age: 75
End: 2020-02-18

## 2020-02-18 LAB
BH CV ECHO MEAS - BSA(HAYCOCK): 1.8 M^2
BH CV ECHO MEAS - BSA: 1.8 M^2
BH CV ECHO MEAS - BZI_BMI: 30.4 KILOGRAMS/M^2
BH CV ECHO MEAS - BZI_METRIC_HEIGHT: 157.5 CM
BH CV ECHO MEAS - BZI_METRIC_WEIGHT: 75.3 KG
BH CV LEA LEFT ANT TIBIAL A DISTAL PSV: 42 CM/S
BH CV LEA LEFT CFA PROX PSV: 118 CM/S
BH CV LEA LEFT DFA PROX PSV: 100 CM/S
BH CV LEA LEFT POPITEAL A  PROX PSV: 43 CM/S
BH CV LEA LEFT PTA DISTAL PSV: 61 CM/S
BH CV LEA LEFT SFA DISTAL PSV: 84 CM/S
BH CV LEA LEFT SFA MID PSV: 101 CM/S
BH CV LEA LEFT SFA PROX PSV: 121 CM/S
BH CV LEA RIGHT ANT TIBIAL A PROX PSV: 51 CM/S
BH CV LEA RIGHT CFA PROX PSV: 130 CM/S
BH CV LEA RIGHT DFA PROX PSV: 72 CM/S
BH CV LEA RIGHT POPITEAL A  PROX PSV: 55 CM/S
BH CV LEA RIGHT PTA DISTAL PSV: 56 CM/S
BH CV LEA RIGHT SFA DISTAL PSV: 95 CM/S
BH CV LEA RIGHT SFA MID PSV: 83 CM/S
BH CV LEA RIGHT SFA PROX PSV: 109 CM/S
DIST ATA PSV LEFT: 41.9 CM/SEC
DIST ATA PSV RIGHT: 51 CM/SEC
DIST PTA PSV LEFT: 60.7 CM/SEC
DIST PTA PSV RIGHT: 56.4 CM/SEC
DIST SFA PSV LEFT: -84 CM/SEC
DIST SFA PSV RIGHT: -94.7 CM/SEC
LEFT CFA PROX SYS PSV: 118 CM/SEC
MID SFA PSV LEFT: -101 CM/SEC
MID SFA PSV RIGHT: -83.3 CM/SEC
PROX PFA PSV LEFT: -99.7 CM/SEC
PROX PFA PSV RIGHT: -71.8 CM/SEC
PROX SFA PSV LEFT: -121 CM/SEC
PROX SFA PSV RIGHT: -109 CM/SEC
RIGHT CFA PROX SYS PSV: 130 CM/SEC

## 2020-02-18 NOTE — TELEPHONE ENCOUNTER
Called patient and notified her of duplex results. Notified to keep follow up as scheduled, patient verbalized understanding and had no further questions at this time. -;[facility]      ----- Message from SHEILA James sent at 2/18/2020  7:07 AM EST -----  Please advise patient.  Duplex Lower Extremity Art / Grafts - Bilateral CAR   Order: [id number]   Status:  Final result   Visible to patient:  No (Not Released) Dx:  Peripheral edema; Decreased pedal pulses   Details     Reading Physician Reading Date Result Priority   Shiraz Byrne MD 2/4/2020 Routine      Result Text     1.  There is diffuse atherosis in both common femoral arteries without obstruction by either echo or Doppler parameters.     2.  Similarly, there is mild atherosclerotic involvement of the superficial femoral arteries without stenosis.     3.  The popliteal arteries are patent and without aneurysmal dilation.     4.  The distal vessels are patent.     Summary: Nonobstructive, predominantly proximal disease bilaterally with no flow-limiting stenoses in either

## 2020-05-15 DIAGNOSIS — I10 ESSENTIAL HYPERTENSION: ICD-10-CM

## 2020-05-15 RX ORDER — LISINOPRIL 40 MG/1
TABLET ORAL
Qty: 30 TABLET | Refills: 6 | Status: SHIPPED | OUTPATIENT
Start: 2020-05-15 | End: 2020-12-14 | Stop reason: SDUPTHER

## 2020-05-15 RX ORDER — CLOPIDOGREL BISULFATE 75 MG/1
TABLET ORAL
Qty: 90 TABLET | Refills: 3 | OUTPATIENT
Start: 2020-05-15

## 2020-07-10 ENCOUNTER — OFFICE VISIT (OUTPATIENT)
Dept: CARDIOLOGY | Facility: CLINIC | Age: 75
End: 2020-07-10

## 2020-07-10 ENCOUNTER — TELEPHONE (OUTPATIENT)
Dept: CARDIOLOGY | Facility: CLINIC | Age: 75
End: 2020-07-10

## 2020-07-10 VITALS
HEIGHT: 62 IN | DIASTOLIC BLOOD PRESSURE: 75 MMHG | BODY MASS INDEX: 31.47 KG/M2 | HEART RATE: 67 BPM | WEIGHT: 171 LBS | SYSTOLIC BLOOD PRESSURE: 109 MMHG | OXYGEN SATURATION: 97 %

## 2020-07-10 DIAGNOSIS — R06.02 SHORTNESS OF BREATH: ICD-10-CM

## 2020-07-10 DIAGNOSIS — I25.119 CORONARY ARTERY DISEASE INVOLVING NATIVE CORONARY ARTERY OF NATIVE HEART WITH ANGINA PECTORIS (HCC): Primary | ICD-10-CM

## 2020-07-10 DIAGNOSIS — F17.200 SMOKING: ICD-10-CM

## 2020-07-10 DIAGNOSIS — I51.89 GRADE I DIASTOLIC DYSFUNCTION: ICD-10-CM

## 2020-07-10 DIAGNOSIS — E78.2 MIXED HYPERLIPIDEMIA: ICD-10-CM

## 2020-07-10 DIAGNOSIS — I73.9 ASYMPTOMATIC PVD (PERIPHERAL VASCULAR DISEASE) (HCC): ICD-10-CM

## 2020-07-10 DIAGNOSIS — Z00.00 HEALTHCARE MAINTENANCE: ICD-10-CM

## 2020-07-10 DIAGNOSIS — R60.9 PERIPHERAL EDEMA: ICD-10-CM

## 2020-07-10 DIAGNOSIS — I10 ESSENTIAL HYPERTENSION: ICD-10-CM

## 2020-07-10 PROCEDURE — 99214 OFFICE O/P EST MOD 30 MIN: CPT | Performed by: NURSE PRACTITIONER

## 2020-07-10 NOTE — PATIENT INSTRUCTIONS
Advance Care Planning and Advance Directives     You make decisions on a daily basis - decisions about where you want to live, your career, your home, your life. Perhaps one of the most important decisions you face is your choice for future medical care. Take time to talk with your family and your healthcare team and start planning today.  Advance Care Planning is a process that can help you:  · Understand possible future healthcare decisions in light of your own experiences  · Reflect on those decision in light of your goals and values  · Discuss your decisions with those closest to you and the healthcare professionals that care for you  · Make a plan by creating a document that reflects your wishes    Surrogate Decision Maker  In the event of a medical emergency, which has left you unable to communicate or to make your own decisions, you would need someone to make decisions for you.  It is important to discuss your preferences for medical treatment with this person while you are in good health.     Qualities of a surrogate decision maker:  • Willing to take on this role and responsibility  • Knows what you want for future medical care  • Willing to follow your wishes even if they don't agree with them  • Able to make difficult medical decisions under stressful circumstances    Advance Directives  These are legal documents you can create that will guide your healthcare team and decision maker(s) when needed. These documents can be stored in the electronic medical record.    · Living Will - a legal document to guide your care if you have a terminal condition or a serious illness and are unable to communicate. States vary by statute in document names/types, but most forms may include one or more of the following:        -  Directions regarding life-prolonging treatments        -  Directions regarding artificially provided nutrition/hydration        -  Choosing a healthcare decision maker        -  Direction  regarding organ/tissue donation    · Durable Power of  for Healthcare - this document names an -in-fact to make medical decisions for you, but it may also allow this person to make personal and financial decisions for you. Please seek the advice of an  if you need this type of document.    **Advance Directives are not required and no one may discriminate against you if you do not sign one.    Medical Orders  Many states allow specific forms/orders signed by your physician to record your wishes for medical treatment in your current state of health. This form, signed in personal communication with your physician, addresses resuscitation and other medical interventions that you may or may not want.        Steps to Quit Smoking  Smoking tobacco is the leading cause of preventable death. It can affect almost every organ in the body. Smoking puts you and people around you at risk for many serious, long-lasting (chronic) diseases. Quitting smoking can be hard, but it is one of the best things that you can do for your health. It is never too late to quit.  How do I get ready to quit?  When you decide to quit smoking, make a plan to help you succeed. Before you quit:  · Pick a date to quit. Set a date within the next 2 weeks to give you time to prepare.  · Write down the reasons why you are quitting. Keep this list in places where you will see it often.  · Tell your family, friends, and co-workers that you are quitting. Their support is important.  · Talk with your doctor about the choices that may help you quit.  · Find out if your health insurance will pay for these treatments.  · Know the people, places, things, and activities that make you want to smoke (triggers). Avoid them.  What first steps can I take to quit smoking?  · Throw away all cigarettes at home, at work, and in your car.  · Throw away the things that you use when you smoke, such as ashtrays and lighters.  · Clean your car. Make sure  to empty the ashtray.  · Clean your home, including curtains and carpets.  What can I do to help me quit smoking?  Talk with your doctor about taking medicines and seeing a counselor at the same time. You are more likely to succeed when you do both.  · If you are pregnant or breastfeeding, talk with your doctor about counseling or other ways to quit smoking. Do not take medicine to help you quit smoking unless your doctor tells you to do so.  To quit smoking:  Quit right away  · Quit smoking totally, instead of slowly cutting back on how much you smoke over a period of time.  · Go to counseling. You are more likely to quit if you go to counseling sessions regularly.  Take medicine  You may take medicines to help you quit. Some medicines need a prescription, and some you can buy over-the-counter. Some medicines may contain a drug called nicotine to replace the nicotine in cigarettes. Medicines may:  · Help you to stop having the desire to smoke (cravings).  · Help to stop the problems that come when you stop smoking (withdrawal symptoms).  Your doctor may ask you to use:  · Nicotine patches, gum, or lozenges.  · Nicotine inhalers or sprays.  · Non-nicotine medicine that is taken by mouth.  Find resources  Find resources and other ways to help you quit smoking and remain smoke-free after you quit. These resources are most helpful when you use them often. They include:  · Online chats with a counselor.  · Phone quitlines.  · Printed self-help materials.  · Support groups or group counseling.  · Text messaging programs.  · Mobile phone apps. Use apps on your mobile phone or tablet that can help you stick to your quit plan. There are many free apps for mobile phones and tablets as well as websites. Examples include Quit Guide from the CDC and smokefree.gov    What things can I do to make it easier to quit?    · Talk to your family and friends. Ask them to support and encourage you.  · Call a phone quitline  (1-800-QUIT-NOW), reach out to support groups, or work with a counselor.  · Ask people who smoke to not smoke around you.  · Avoid places that make you want to smoke, such as:  ? Bars.  ? Parties.  ? Smoke-break areas at work.  · Spend time with people who do not smoke.  · Lower the stress in your life. Stress can make you want to smoke. Try these things to help your stress:  ? Getting regular exercise.  ? Doing deep-breathing exercises.  ? Doing yoga.  ? Meditating.  ? Doing a body scan. To do this, close your eyes, focus on one area of your body at a time from head to toe. Notice which parts of your body are tense. Try to relax the muscles in those areas.  How will I feel when I quit smoking?  Day 1 to 3 weeks  Within the first 24 hours, you may start to have some problems that come from quitting tobacco. These problems are very bad 2-3 days after you quit, but they do not often last for more than 2-3 weeks. You may get these symptoms:  · Mood swings.  · Feeling restless, nervous, angry, or annoyed.  · Trouble concentrating.  · Dizziness.  · Strong desire for high-sugar foods and nicotine.  · Weight gain.  · Trouble pooping (constipation).  · Feeling like you may vomit (nausea).  · Coughing or a sore throat.  · Changes in how the medicines that you take for other issues work in your body.  · Depression.  · Trouble sleeping (insomnia).  Week 3 and afterward  After the first 2-3 weeks of quitting, you may start to notice more positive results, such as:  · Better sense of smell and taste.  · Less coughing and sore throat.  · Slower heart rate.  · Lower blood pressure.  · Clearer skin.  · Better breathing.  · Fewer sick days.  Quitting smoking can be hard. Do not give up if you fail the first time. Some people need to try a few times before they succeed. Do your best to stick to your quit plan, and talk with your doctor if you have any questions or concerns.  Summary  · Smoking tobacco is the leading cause of  preventable death. Quitting smoking can be hard, but it is one of the best things that you can do for your health.  · When you decide to quit smoking, make a plan to help you succeed.  · Quit smoking right away, not slowly over a period of time.  · When you start quitting, seek help from your doctor, family, or friends.  This information is not intended to replace advice given to you by your health care provider. Make sure you discuss any questions you have with your health care provider.  Document Released: 10/14/2010 Document Revised: 03/06/2020 Document Reviewed: 03/07/2020  [employer] Patient Education © 2020 [employer]  How to Quarantine at Home  Information for Patients and Families    These instructions are for people with confirmed or suspected COVID-19 who do not need to be hospitalized and those with confirmed COVID-19 who were hospitalized and discharged to care for themselves at home.    If you were tested through the Health Department  The Health Department will monitor your wellbeing.  If it is determined that you do not need to be hospitalized and can be isolated at home, you will be monitored by staff from your local or state health department.     If you were tested through a [facility]  You will need to monitor yourself and report changes in your symptoms to your doctor.  See the section below called Monitor Your Symptoms.    Follow these steps until a healthcare provider or local or state health department says you can return to your normal activities.    Stay home except to get medical care  • Restrict activities outside your home, except for getting medical care.   • Do not go to work, school, or public areas.   • Avoid using public transportation, ride-sharing, or taxis.    Separate yourself from other people and animals in your home  People  As much as possible, you should stay in a specific room and away from other people in your home. Also, you should use a separate bathroom, if  available.    Animals  You should restrict contact with pets and other animals while you are sick with COVID-19, just like you would around other people. When possible, have another member of your household care for your animals while you are sick. If you are sick with COVID-19, avoid contact with your pet, including petting, snuggling, being kissed or licked, and sharing food. If you must care for your pet or be around animals while you are sick, wash your hands before and after you interact with pets and wear a facemask. See COVID-19 and Animals for more information.    Call ahead before visiting your doctor  If you have a medical appointment, call the healthcare provider and tell them that you have or may have COVID-19. This information will help the healthcare provider’s office take steps to keep other people from getting infected or exposed.    Wear a facemask  You should wear a facemask when you are around other people (e.g., sharing a room or vehicle) or pets and before you enter a healthcare provider’s office.     If you are not able to wear a facemask (for example, because it causes trouble breathing), then people who live with you should not stay in the same room with you, or they should wear a facemask if they enter your room.    Cover your coughs and sneezes  • Cover your mouth and nose with a tissue when you cough or sneeze.   • Throw used tissues in a lined trash can.   • Immediately wash your hands with soap and water for at least 20 seconds or, if soap and water are not available, clean your hands with an alcohol-based hand  that contains at least 60% alcohol.    Clean your hands often  • Wash your hands often with soap and water for at least 20 seconds, especially after blowing your nose, coughing, or sneezing; going to the bathroom; and before eating or preparing food.     • If soap and water are not readily available, use an alcohol-based hand  with at least 60% alcohol, covering  all surfaces of your hands and rubbing them together until they feel dry.    • Soap and water are the best option if hands are visibly dirty. Avoid touching your eyes, nose, and mouth with unwashed hands.    Avoid sharing personal household items  • You should not share dishes, drinking glasses, cups, eating utensils, towels, or bedding with other people or pets in your home.   • After using these items, they should be washed thoroughly with soap and water.    Clean all “high-touch” surfaces everyday  • High touch surfaces include counters, tabletops, doorknobs, bathroom fixtures, toilets, phones, keyboards, tablets, and bedside tables.   • Also, clean any surfaces that may have blood, stool, or body fluids on them.   • Use a household cleaning spray or wipe, according to the label instructions. Labels contain instructions for safe and effective use of the cleaning product, including precautions you should take when applying the product, such as wearing gloves and making sure you have good ventilation during use of the product.    Monitor your symptoms  • Seek prompt medical attention if your illness is worsening (e.g., difficulty breathing).   • Before seeking care, call your healthcare provider and tell them that you have, or are being evaluated for, COVID-19.   • Put on a facemask before you enter the facility.     • These steps will help the healthcare provider’s office to keep other people in the office or waiting room from getting infected or exposed.   • Persons who are placed under active monitoring or facilitated self-monitoring should follow instructions provided by their local health department or occupational health professionals, as appropriate.  • If you have a medical emergency and need to call 911, notify the dispatch personnel that you have, or are being evaluated for COVID-19. If possible, put on a facemask before emergency medical services arrive.    Discontinuing home isolation  Patients with  confirmed COVID-19 should remain under home isolation precautions until the risk of secondary transmission to others is thought to be low. The decision to discontinue home isolation precautions should be made on a case-by-case basis, in consultation with healthcare providers and state and local health departments.    The below content are for household members, intimate partners, and caregivers of a patient with symptomatic laboratory-confirmed COVID-19 or a patient under investigation:    Household members, intimate partners, and caregivers may have close contact with a person with symptomatic, laboratory-confirmed COVID-19 or a person under investigation.     Close contacts should monitor their health; they should call their healthcare provider right away if they develop symptoms suggestive of COVID-19 (e.g., fever, cough, shortness of breath)     Close contacts should also follow these recommendations:  • Make sure that you understand and can help the patient follow their healthcare provider’s instructions for medication(s) and care. You should help the patient with basic needs in the home and provide support for getting groceries, prescriptions, and other personal needs.  • Monitor the patient’s symptoms. If the patient is getting sicker, call his or her healthcare provider and tell them that the patient has laboratory-confirmed COVID-19. This will help the healthcare provider’s office take steps to keep other people in the office or waiting room from getting infected. Ask the healthcare provider to call the local or state health department for additional guidance. If the patient has a medical emergency and you need to call 911, notify the dispatch personnel that the patient has, or is being evaluated for COVID-19.  • Household members should stay in another room or be  from the patient as much as possible. Household members should use a separate bedroom and bathroom, if available.  • Prohibit visitors  who do not have an essential need to be in the home.  • Household members should care for any pets in the home. Do not handle pets or other animals while sick.  For more information, see COVID-19 and Animals.  • Make sure that shared spaces in the home have good air flow, such as by an air conditioner or an opened window, weather permitting.  • Perform hand hygiene frequently. Wash your hands often with soap and water for at least 20 seconds or use an alcohol-based hand  that contains 60 to 95% alcohol, covering all surfaces of your hands and rubbing them together until they feel dry. Soap and water should be used preferentially if hands are visibly dirty.  • Avoid touching your eyes, nose, and mouth with unwashed hands.  • The patient should wear a facemask when you are around other people. If the patient is not able to wear a facemask (for example, because it causes trouble breathing), you, as the caregiver, should wear a mask when you are in the same room as the patient.  • Wear a disposable facemask and gloves when you touch or have contact with the patient’s blood, stool, or body fluids, such as saliva, sputum, nasal mucus, vomit, or urine.   o Throw out disposable facemasks and gloves after using them. Do not reuse.  o When removing personal protective equipment, first remove and dispose of gloves. Then, immediately clean your hands with soap and water or alcohol-based hand . Next, remove and dispose of facemask, and immediately clean your hands again with soap and water or alcohol-based hand .  • Avoid sharing household items with the patient. You should not share dishes, drinking glasses, cups, eating utensils, towels, bedding, or other items. After the patient uses these items, you should wash them thoroughly (see below “Wash laundry thoroughly”).  • Clean all “high-touch” surfaces, such as counters, tabletops, doorknobs, bathroom fixtures, toilets, phones, keyboards, tablets, and  bedside tables, every day. Also, clean any surfaces that may have blood, stool, or body fluids on them.   o Use a household cleaning spray or wipe, according to the label instructions. Labels contain instructions for safe and effective use of the cleaning product including precautions you should take when applying the product, such as wearing gloves and making sure you have good ventilation during use of the product.  • Wash laundry thoroughly.   o Immediately remove and wash clothes or bedding that have blood, stool, or body fluids on them.  o Wear disposable gloves while handling soiled items and keep soiled items away from your body. Clean your hands (with soap and water or an alcohol-based hand ) immediately after removing your gloves.  o Read and follow directions on labels of laundry or clothing items and detergent. In general, using a normal laundry detergent according to washing machine instructions and dry thoroughly using the warmest temperatures recommended on the clothing label.  • Place all used disposable gloves, facemasks, and other contaminated items in a lined container before disposing of them with other household waste. Clean your hands (with soap and water or an alcohol-based hand ) immediately after handling these items. Soap and water should be used preferentially if hands are visibly dirty.  • Discuss any additional questions with your state or local health department or healthcare provider.    Adapted from information provided by the Centers for Disease Control and Prevention.  For more information, visit https://www.cdc.gov/coronavirus/2019-ncov/hcp/guidance-prevent-spread.html  Fat and Cholesterol Restricted Eating Plan  Getting too much fat and cholesterol in your diet may cause health problems. Choosing the right foods helps keep your fat and cholesterol at normal levels. This can keep you from getting certain diseases.  Your doctor may recommend an eating plan that  "includes:  · Total fat: ______% or less of total calories a day.  · Saturated fat: ______% or less of total calories a day.  · Cholesterol: less than _________mg a day.  · Fiber: ______g a day.  What are tips for following this plan?  Meal planning  · At meals, divide your plate into four equal parts:  ? Fill one-half of your plate with vegetables and green salads.  ? Fill one-fourth of your plate with whole grains.  ? Fill one-fourth of your plate with low-fat (lean) protein foods.  · Eat fish that is high in omega-3 fats at least two times a week. This includes mackerel, tuna, sardines, and salmon.  · Eat foods that are high in fiber, such as whole grains, beans, apples, broccoli, carrots, peas, and barley.  General tips    · Work with your doctor to lose weight if you need to.  · Avoid:  ? Foods with added sugar.  ? Fried foods.  ? Foods with partially hydrogenated oils.  · Limit alcohol intake to no more than 1 drink a day for nonpregnant women and 2 drinks a day for men. One drink equals 12 oz of beer, 5 oz of wine, or 1½ oz of hard liquor.  Reading food labels  · Check food labels for:  ? Trans fats.  ? Partially hydrogenated oils.  ? Saturated fat (g) in each serving.  ? Cholesterol (mg) in each serving.  ? Fiber (g) in each serving.  · Choose foods with healthy fats, such as:  ? Monounsaturated fats.  ? Polyunsaturated fats.  ? Omega-3 fats.  · Choose grain products that have whole grains. Look for the word \"whole\" as the first word in the ingredient list.  Cooking  · Cook foods using low-fat methods. These include baking, boiling, grilling, and broiling.  · Eat more home-cooked foods. Eat at restaurants and buffets less often.  · Avoid cooking using saturated fats, such as butter, cream, palm oil, palm kernel oil, and coconut oil.  Recommended foods    Fruits  · All fresh, canned (in natural juice), or frozen fruits.  Vegetables  · Fresh or frozen vegetables (raw, steamed, roasted, or grilled). Green " salads.  Grains  · Whole grains, such as whole wheat or whole grain breads, crackers, cereals, and pasta. Unsweetened oatmeal, bulgur, barley, quinoa, or brown rice. Corn or whole wheat flour tortillas.  Meats and other protein foods  · Ground beef (85% or leaner), grass-fed beef, or beef trimmed of fat. Skinless chicken or turkey. Ground chicken or turkey. Pork trimmed of fat. All fish and seafood. Egg whites. Dried beans, peas, or lentils. Unsalted nuts or seeds. Unsalted canned beans. Nut butters without added sugar or oil.  Dairy  · Low-fat or nonfat dairy products, such as skim or 1% milk, 2% or reduced-fat cheeses, low-fat and fat-free ricotta or cottage cheese, or plain low-fat and nonfat yogurt.  Fats and oils  · Tub margarine without trans fats. Light or reduced-fat mayonnaise and salad dressings. Avocado. Olive, canola, sesame, or safflower oils.  The items listed above may not be a complete list of foods and beverages you can eat. Contact a dietitian for more information.  Foods to avoid  Fruits  · Canned fruit in heavy syrup. Fruit in cream or butter sauce. Fried fruit.  Vegetables  · Vegetables cooked in cheese, cream, or butter sauce. Fried vegetables.  Grains  · White bread. White pasta. White rice. Cornbread. Bagels, pastries, and croissants. Crackers and snack foods that contain trans fat and hydrogenated oils.  Meats and other protein foods  · Fatty cuts of meat. Ribs, chicken wings, hartmann, sausage, bologna, salami, chitterlings, fatback, hot dogs, bratwurst, and packaged lunch meats. Liver and organ meats. Whole eggs and egg yolks. Chicken and turkey with skin. Fried meat.  Dairy  · Whole or 2% milk, cream, half-and-half, and cream cheese. Whole milk cheeses. Whole-fat or sweetened yogurt. Full-fat cheeses. Nondairy creamers and whipped toppings. Processed cheese, cheese spreads, and cheese curds.  Beverages  · Alcohol. Sugar-sweetened drinks such as sodas, lemonade, and fruit drinks.  Fats and  oils  · Butter, stick margarine, lard, shortening, ghee, or hartmann fat. Coconut, palm kernel, and palm oils.  Sweets and desserts  · Corn syrup, sugars, honey, and molasses. Candy. Jam and jelly. Syrup. Sweetened cereals. Cookies, pies, cakes, donuts, muffins, and ice cream.  The items listed above may not be a complete list of foods and beverages you should avoid. Contact a dietitian for more information.  Summary  · Choosing the right foods helps keep your fat and cholesterol at normal levels. This can keep you from getting certain diseases.  · At meals, fill one-half of your plate with vegetables and green salads.  · Eat high-fiber foods, like whole grains, beans, apples, carrots, peas, and barley.  · Limit added sugar, saturated fats, alcohol, and fried foods.  This information is not intended to replace advice given to you by your health care provider. Make sure you discuss any questions you have with your health care provider.  Document Released: 06/18/2013 Document Revised: 08/21/2019 Document Reviewed: 09/04/2018  [employer] Patient Education © 2020 [employer]    BMI for Adults    Body mass index (BMI) is a number that is calculated from a person's weight and height. BMI may help to estimate how much of a person's weight is composed of fat. BMI can help identify those who may be at higher risk for certain medical problems.  How is BMI used with adults?  BMI is used as a screening tool to identify possible weight problems. It is used to check whether a person is obese, overweight, healthy weight, or underweight.  How is BMI calculated?  BMI measures your weight and compares it to your height. This can be done either in English ([place] or metric measurements. Note that charts are available to help you find your BMI quickly and easily without having to do these calculations yourself.  To calculate your BMI in English (U.[place] measurements, your health care provider will:  1. Measure your weight in pounds  "(lb).  2. Multiply the number of pounds by 703.  ? For example, for a person who weighs 180 lb, multiply that number by 703, which equals 126,540.  3. Measure your height in inches (in). Then multiply that number by itself to get a measurement called \"inches squared.\"  ? For example, for a person who is 70 in tall, the \"inches squared\" measurement is 70 in x 70 in, which equals 4900 inches squared.  4. Divide the total from Step 2 (number of lb x 703) by the total from Step 3 (inches squared): 126,540 ÷ 4900 = 25.8. This is your BMI.  To calculate your BMI in metric measurements, your health care provider will:  1. Measure your weight in kilograms (kg).  2. Measure your height in meters (m). Then multiply that number by itself to get a measurement called \"meters squared.\"  ? For example, for a person who is 1.75 m tall, the \"meters squared\" measurement is 1.75 m x 1.75 m, which is equal to 3.1 meters squared.  3. Divide the number of kilograms (your weight) by the meters squared number. In this example: 70 ÷ 3.1 = 22.6. This is your BMI.  How is BMI interpreted?  To interpret your results, your health care provider will use BMI charts to identify whether you are underweight, normal weight, overweight, or obese. The following guidelines will be used:  · Underweight: BMI less than 18.5.  · Normal weight: BMI between 18.5 and 24.9.  · Overweight: BMI between 25 and 29.9.  · Obese: BMI of 30 and above.  Please note:  · Weight includes both fat and muscle, so someone with a muscular build, such as an athlete, may have a BMI that is higher than 24.9. In cases like these, BMI is not an accurate measure of body fat.  · To determine if excess body fat is the cause of a BMI of 25 or higher, further assessments may need to be done by a health care provider.  · BMI is usually interpreted in the same way for men and women.  Why is BMI a useful tool?  BMI is useful in two ways:  · Identifying a weight problem that may be related " to a medical condition, or that may increase the risk for medical problems.  · Promoting lifestyle and diet changes in order to reach a healthy weight.  Summary  · Body mass index (BMI) is a number that is calculated from a person's weight and height.  · BMI may help to estimate how much of a person's weight is composed of fat. BMI can help identify those who may be at higher risk for certain medical problems.  · BMI can be measured using English measurements or metric measurements.  · To interpret your results, your health care provider will use BMI charts to identify whether you are underweight, normal weight, overweight, or obese.  This information is not intended to replace advice given to you by your health care provider. Make sure you discuss any questions you have with your health care provider.  Document Released: 08/29/2005 Document Revised: 11/30/2018 Document Reviewed: 10/31/2018  [employer] Patient Education © 2020 [employer]  For more information or to schedule a time with a [facility] Advance Care Planning Facilitator contact: [facility] or call [phone] and someone will contact you directly.  Edema    Edema is an abnormal buildup of fluids in the body tissues and under the skin. Swelling of the legs, feet, and ankles is a common symptom that becomes more likely as you get older. Swelling is also common in looser tissues, like around the eyes. When the affected area is squeezed, the fluid may move out of that spot and leave a dent for a few moments. This dent is called pitting edema.  There are many possible causes of edema. Eating too much salt (sodium) and being on your feet or sitting for a long time can cause edema in your legs, feet, and ankles. Hot weather may make edema worse. Common causes of edema include:  · Heart failure.  · Liver or kidney disease.  · Weak leg blood vessels.  · Cancer.  · An injury.  · Pregnancy.  · Medicines.  · Being obese.  · Low protein levels in the  blood.  Edema is usually painless. Your skin may look swollen or shiny.  Follow these instructions at home:  · Keep the affected body part raised (elevated) above the level of your heart when you are sitting or lying down.  · Do not sit still or stand for long periods of time.  · Do not wear tight clothing. Do not wear garters on your upper legs.  · Exercise your legs to get your circulation going. This helps to move the fluid back into your blood vessels, and it may help the swelling go down.  · Wear elastic bandages or support stockings to reduce swelling as told by your health care provider.  · Eat a low-salt (low-sodium) diet to reduce fluid as told by your health care provider.  · Depending on the cause of your swelling, you may need to limit how much fluid you drink (fluid restriction).  · Take over-the-counter and prescription medicines only as told by your health care provider.  Contact a health care provider if:  · Your edema does not get better with treatment.  · You have heart, liver, or kidney disease and have symptoms of edema.  · You have sudden and unexplained weight gain.  Get help right away if:  · You develop shortness of breath or chest pain.  · You cannot breathe when you lie down.  · You develop pain, redness, or warmth in the swollen areas.  · You have heart, liver, or kidney disease and suddenly get edema.  · You have a fever and your symptoms suddenly get worse.  Summary  · Edema is an abnormal buildup of fluids in the body tissues and under the skin.  · Eating too much salt (sodium) and being on your feet or sitting for a long time can cause edema in your legs, feet, and ankles.  · Keep the affected body part raised (elevated) above the level of your heart when you are sitting or lying down.  This information is not intended to replace advice given to you by your health care provider. Make sure you discuss any questions you have with your health care provider.  Document Released: 12/18/2006  Document Revised: 12/21/2018 Document Reviewed: 01/20/2018  [employer]vier Patient Education © 2020 [employer]

## 2020-07-10 NOTE — PROGRESS NOTES
"Subjective   Cassy A May is a 74 y.o. female     Chief Complaint   Patient presents with   • Follow-up   • Coronary Artery Disease       HPI    PROBLEM LIST:     1. CAD.  1.1 Cardiac Cath. \"Failed\" PCI of the RCA. [facility]. Winter 2014- Intraprocedural closure of vessel. ILUMIEN-1 stent mid-LAD, 01/20/2014. [place], [place].  1.2 Nuclear stress test 8-7-18 with lateral wall ischemia  1.3 left heart catheter 11/16/18-2 stents to the right coronary artery, 30-50% ostial narrowing of the circumflex, EF 50-55%, left ventricular end of blood pressure 14  2. Shortness of Breath.  2.1 Echo 8-7-18 with Ef > 65%, mild DD, trace MR, trace TR, pulm. Pressures 30-35 mmHg, no pericardial effusion  3. Hyperlipidemia.   4.  Smoking habituation  5.  PVD  5.1 bilateral lower extremity arterial ultrasound 2/4/2020-diffuse arthrosis in both common femoral arteries without obstruction, stimulate mild atherosclerotic involvement of the superficial femoral arteries without stenosis, popliteal arteries are widely patent without aneurysmal dilation, distal vessels are patent, nonobstructive disease    Patient is a 74-year-old female who presents today for a follow-up.  She denies any chest pain, pressure, palpitations, fluttering, dizziness, presyncope, syncope, orthopnea or PND.  She does have swelling that is much better.  She has some in her left lower extremity still but she has been using her water pill every day.  She says she does get short of breath with normal activity but this has not changed.  Patient does complain of some cramping in her legs on rare occasion but we will check her labs to make sure she is not depleted of potassium and her magnesium.    We went over bilateral lower extremity ultrasound.    Current Outpatient Medications on File Prior to Visit   Medication Sig Dispense Refill   • amLODIPine (NORVASC) 5 MG tablet Take 1 tablet by mouth Daily. 90 tablet 3   • aspirin 81 MG tablet Take 1 tablet by mouth " Daily. 90 tablet 3   • Coenzyme Q10 (CO Q-10) 100 MG capsule Take 100 mg by mouth 2 (Two) Times a Day. (Patient taking differently: Take 1 capsule by mouth 2 (two) times a day.) 60 capsule 0   • furosemide (LASIX) 20 MG tablet Take 1 tablet by mouth Daily As Needed (edema). 90 tablet 3   • lisinopril (PRINIVIL,ZESTRIL) 40 MG tablet TAKE 1 TABLET BY MOUTH ONCE DAILY 30 tablet 6   • nitroglycerin (NITROSTAT) 0.4 MG SL tablet Place 1 tablet under the tongue Every 5 (Five) Minutes As Needed for Chest Pain. Take no more than 3 doses in 15 minutes. 25 tablet 6   • Pseudoephedrine-Guaifenesin (MUCINEX D PO) Take 1 tablet by mouth Daily.     • rosuvastatin (CRESTOR) 20 MG tablet Take 1 tablet by mouth Daily. 90 tablet 3     No current facility-administered medications on file prior to visit.        ALLERGIES    Patient has no known allergies.    Past Medical History:   Diagnosis Date   • Coronary artery disease    • Hyperlipidemia    • Hypertension        Social History     Socioeconomic History   • Marital status:      Spouse name: Not on file   • Number of children: Not on file   • Years of education: Not on file   • Highest education level: Not on file   Tobacco Use   • Smoking status: Current Every Day Smoker     Packs/day: 1.50     Years: 50.00     Pack years: 75.00     Types: Cigarettes   • Smokeless tobacco: Never Used   Substance and Sexual Activity   • Alcohol use: No   • Drug use: No   • Sexual activity: Defer       Family History   Problem Relation Age of Onset   • Hypertension Mother    • Heart disease Mother    • Diabetes Mother    • Heart attack Father        Review of Systems   Constitutional: Positive for fatigue. Negative for diaphoresis.   HENT: Positive for congestion and rhinorrhea. Negative for sore throat.    Eyes: Positive for visual disturbance (reading glasses).   Respiratory: Positive for shortness of breath (w/ normal daily activity ). Negative for chest tightness.    Cardiovascular:  "Positive for leg swelling (LLE edema, doesn't go down overnight; much better than it was ). Negative for chest pain (Denies CP) and palpitations.   Gastrointestinal: Negative for abdominal pain, blood in stool, constipation, diarrhea, nausea and vomiting.   Endocrine: Negative for cold intolerance and heat intolerance.   Genitourinary: Positive for frequency (thinks it's from water pills ). Negative for difficulty urinating, dysuria, hematuria and urgency.   Musculoskeletal: Positive for back pain. Negative for arthralgias and neck pain.   Skin: Positive for wound (states she's healing from elbow surgery- left arm ). Negative for rash.   Allergic/Immunologic: Positive for environmental allergies (seasonal ). Negative for food allergies.   Neurological: Negative for dizziness, syncope, weakness, light-headedness, numbness and headaches.   Hematological: Does not bruise/bleed easily.   Psychiatric/Behavioral: Negative for sleep disturbance.       Objective   /75   Pulse 67   Ht 157.5 cm (62\")   Wt 77.6 kg (171 lb)   SpO2 97%   BMI 31.28 kg/m²   Vitals:    07/10/20 0906   BP: 109/75   Pulse: 67   SpO2: 97%   Weight: 77.6 kg (171 lb)   Height: 157.5 cm (62\")      Lab Results (most recent)     None        Physical Exam   Constitutional: She is oriented to person, place, and time. Vital signs are normal. She appears well-developed and well-nourished. She is active and cooperative.   HENT:   Head: Normocephalic.   Eyes: Lids are normal.   Neck: Normal carotid pulses, no hepatojugular reflux and no JVD present. Carotid bruit is not present.   Cardiovascular: Normal rate, regular rhythm and normal heart sounds.   Pulses:       Radial pulses are 2+ on the right side, and 2+ on the left side.        Dorsalis pedis pulses are 2+ on the right side, and 2+ on the left side.        Posterior tibial pulses are 2+ on the right side, and 2+ on the left side.   No edema BLE.   Pulmonary/Chest: Effort normal. She has " decreased breath sounds in the right lower field and the left lower field.   Abdominal: Normal appearance and bowel sounds are normal.   Neurological: She is alert and oriented to person, place, and time.   Skin: Skin is warm, dry and intact.   Purplish color BLE toes    Psychiatric: She has a normal mood and affect. Her speech is normal and behavior is normal. Judgment and thought content normal. Cognition and memory are normal.       Procedure   Procedures         Assessment/Plan      Diagnosis Plan   1. Coronary artery disease involving native coronary artery of native heart with angina pectoris (CMS/[facility])     2. Essential hypertension  Basic Metabolic Panel   3. Mixed hyperlipidemia     4. Shortness of breath     5. Smoking     6. Peripheral edema  Magnesium   7. Healthcare maintenance  Basic Metabolic Panel    Magnesium   8. Asymptomatic PVD (peripheral vascular disease) (CMS/[facility])     9. Grade I diastolic dysfunction         Return in about 6 months (around 1/10/2021).    CAD-patient's on aspirin and statin.  Hypertension-patient's doing well on amlodipine and lisinopril.  Hyperlipidemia-patient is on Crestor and co-Q10.  This is monitored by PCP.  Shortness of breath-stable.  Smoking-encourage cessation.  Peripheral edema/diastolic dysfunction-patient is using Lasix.  She did complain of some cramping in her legs that resolved quickly but we will get a BMP and mag due to her taking diuretics.  She will continue her medication regimen for now.  She will follow-up in 6 months or sooner if any changes.       Cassy Olvera  reports that she has been smoking cigarettes. She has a 75.00 pack-year smoking history. She has never used smokeless tobacco.. I have educated her on the risk of diseases from using tobacco products such as cancer, COPD and heart diease.     I advised her to quit and she is not willing to quit.    Patient's Body mass index is 31.28 kg/m². BMI is above normal parameters. Recommendations  include: educational material.      Electronically signed by:

## 2020-07-10 NOTE — TELEPHONE ENCOUNTER
Called and reviewed lab results with patient. Notified to keep follow up as scheduled, patient had no further questions at this time. -;[facility]      ----- Message from SHEILA James sent at 7/10/2020 11:56 AM EDT -----  Please advise patient labs are good.  Can continue to use lasix as she is.

## 2020-12-14 DIAGNOSIS — I10 ESSENTIAL HYPERTENSION: ICD-10-CM

## 2020-12-14 RX ORDER — LISINOPRIL 40 MG/1
40 TABLET ORAL DAILY
Qty: 30 TABLET | Refills: 6 | Status: SHIPPED | OUTPATIENT
Start: 2020-12-14 | End: 2021-01-08 | Stop reason: SDUPTHER

## 2021-01-08 ENCOUNTER — OFFICE VISIT (OUTPATIENT)
Dept: CARDIOLOGY | Facility: CLINIC | Age: 76
End: 2021-01-08

## 2021-01-08 VITALS
HEIGHT: 62 IN | HEART RATE: 84 BPM | TEMPERATURE: 97.1 F | SYSTOLIC BLOOD PRESSURE: 149 MMHG | WEIGHT: 175 LBS | DIASTOLIC BLOOD PRESSURE: 76 MMHG | BODY MASS INDEX: 32.2 KG/M2 | OXYGEN SATURATION: 93 %

## 2021-01-08 DIAGNOSIS — R60.9 PERIPHERAL EDEMA: ICD-10-CM

## 2021-01-08 DIAGNOSIS — I25.119 CORONARY ARTERY DISEASE INVOLVING NATIVE CORONARY ARTERY OF NATIVE HEART WITH ANGINA PECTORIS (HCC): Primary | ICD-10-CM

## 2021-01-08 DIAGNOSIS — R06.02 SHORTNESS OF BREATH: ICD-10-CM

## 2021-01-08 DIAGNOSIS — F17.200 SMOKING: ICD-10-CM

## 2021-01-08 DIAGNOSIS — R07.2 PRECORDIAL PAIN: ICD-10-CM

## 2021-01-08 DIAGNOSIS — J98.8 CONGESTION OF UPPER AIRWAY: ICD-10-CM

## 2021-01-08 DIAGNOSIS — E78.2 MIXED HYPERLIPIDEMIA: ICD-10-CM

## 2021-01-08 DIAGNOSIS — I51.89 GRADE I DIASTOLIC DYSFUNCTION: ICD-10-CM

## 2021-01-08 DIAGNOSIS — I73.9 ASYMPTOMATIC PVD (PERIPHERAL VASCULAR DISEASE) (HCC): ICD-10-CM

## 2021-01-08 DIAGNOSIS — Z00.00 HEALTHCARE MAINTENANCE: ICD-10-CM

## 2021-01-08 DIAGNOSIS — I10 ESSENTIAL HYPERTENSION: ICD-10-CM

## 2021-01-08 PROCEDURE — 99214 OFFICE O/P EST MOD 30 MIN: CPT | Performed by: NURSE PRACTITIONER

## 2021-01-08 PROCEDURE — 93000 ELECTROCARDIOGRAM COMPLETE: CPT | Performed by: NURSE PRACTITIONER

## 2021-01-08 RX ORDER — GUAIFENESIN AND DEXTROMETHORPHAN HYDROBROMIDE 1200; 60 MG/1; MG/1
1 TABLET, EXTENDED RELEASE ORAL EVERY 12 HOURS
Qty: 20 TABLET | Refills: 3 | Status: SHIPPED | OUTPATIENT
Start: 2021-01-08 | End: 2021-02-17

## 2021-01-08 RX ORDER — DIMENHYDRINATE 50 MG
1 TABLET ORAL 2 TIMES DAILY
Qty: 180 CAPSULE | Refills: 3 | Status: SHIPPED | OUTPATIENT
Start: 2021-01-08 | End: 2022-01-14 | Stop reason: SDUPTHER

## 2021-01-08 RX ORDER — AMLODIPINE BESYLATE 5 MG/1
5 TABLET ORAL DAILY
Qty: 90 TABLET | Refills: 3 | Status: SHIPPED | OUTPATIENT
Start: 2021-01-08 | End: 2022-01-14 | Stop reason: SDUPTHER

## 2021-01-08 RX ORDER — POTASSIUM CHLORIDE 750 MG/1
TABLET, FILM COATED, EXTENDED RELEASE ORAL
Qty: 6 TABLET | Refills: 3 | Status: SHIPPED | OUTPATIENT
Start: 2021-01-08 | End: 2021-07-09

## 2021-01-08 RX ORDER — LISINOPRIL 40 MG/1
40 TABLET ORAL DAILY
Qty: 90 TABLET | Refills: 3 | Status: SHIPPED | OUTPATIENT
Start: 2021-01-08 | End: 2022-01-14 | Stop reason: SDUPTHER

## 2021-01-08 RX ORDER — FUROSEMIDE 20 MG/1
TABLET ORAL
Qty: 96 TABLET | Refills: 3 | Status: SHIPPED | OUTPATIENT
Start: 2021-01-08 | End: 2021-07-09

## 2021-01-08 RX ORDER — ROSUVASTATIN CALCIUM 20 MG/1
20 TABLET, COATED ORAL DAILY
Qty: 90 TABLET | Refills: 3 | Status: SHIPPED | OUTPATIENT
Start: 2021-01-08 | End: 2021-03-31

## 2021-01-08 NOTE — PATIENT INSTRUCTIONS
Smokeless Tobacco Information, Adult    Tobacco is a leafy plant that contains a chemical (nicotine). Nicotine affects your brain and causes you to become addicted to it. Smokeless tobacco is tobacco that you put in your mouth instead of smoking it. It may also be called chewing tobacco or snuff.  Smokeless tobacco is made from the leaves of tobacco plants and comes in many forms, such as:  · Loose, dry leaves.  · Plugs or twists.  · Moist pouches.  · Dissolving lozenges or strips.  Chewing, sucking, or holding the tobacco in your mouth causes your mouth to make more saliva. The saliva mixes with the tobacco, which you swallow or spit out. Using tobacco is harmful to your health.  How can smokeless tobacco affect me?  All forms of tobacco contain many chemicals that can harm every organ in the body. Using smokeless tobacco increases your risk for:  · Cancer. Tobacco use is one of the leading causes of cancer. Smokeless tobacco is linked to cancer of the mouth, esophagus, and pancreas.  · Other long-term health problems, including high blood pressure, heart disease, and stroke.  · Addiction.  · Pregnancy complications, if this applies. Pregnant women who use smokeless tobacco are more likely to have a miscarriage or deliver a baby too early.  · Mouth and dental problems, such as:  ? Bad breath.  ? Teeth that look yellow or brown.  ? Mouth sores.  ? Cracking and bleeding lips.  ? Gum recession, gum disease, or tooth decay.  ? Lesions on the soft tissues of your mouth (leukoplakia).  The benefits of not using smokeless tobacco include:  · A healthy mind and body.  · Saving money. You avoid the cost of buying tobacco and the cost of treating illnesses that are caused by tobacco.  What actions can I take to quit using tobacco?  Ask your health care provider for help to quit using smokeless tobacco. This may involve treatment.  These tips may also help you quit:  · Pick a date to quit. Set a date within the next two  weeks. This gives you time to prepare.  · Write down the reasons why you are quitting. Keep this list in places where you will see it often, such as on your bathroom mirror or in your car or wallet.  · Identify the people, places, things, and activities that make you want to use smokeless tobacco (triggers). Avoid them.  · Get rid of any tobacco you have and remove any tobacco smells. To do this:  ? Throw away all containers of tobacco at home, at work, and in your car.  ? Throw away any other items that you use regularly when you chew tobacco.  ? Clean your car and make sure to remove all tobacco-related items.  ? Clean your home, including curtains and carpets.  · Tell your family and friends that you are quitting. Having support can make quitting easier.  · Chew sugarless gum or sunflower seeds when you want to use smokeless tobacco.  · Stay positive. Be prepared for cravings. It is common to slip up when you first quit, so take it one day at a time.  · Stay busy and take care of your body. Get plenty of exercise. Drink enough water to keep your urine pale yellow.  · Keep track of how many days have passed since you quit. Remembering how long and hard you have worked to quit can help you avoid using smokeless tobacco again.  Where to find support  Ask your health care provider if there is a local support group for quitting smokeless tobacco.  Where to find more information  You can learn more about the risks of using smokeless tobacco and the benefits of quitting from these sources:  · American Cancer Society: www.cancer.org  · National Cancer [place]: www.cancer.gov  · Centers for Disease Control and Prevention: www.cdc.gov  Contact a health care provider if you have:  · Trouble quitting smokeless tobacco use on your own.  · White or other discolored patches in your mouth.  · Difficulty swallowing.  · A change in your voice.  · Unexplained weight loss.  · Stomach pain, nausea, or  vomiting.  Summary  · Smokeless tobacco contains many different chemicals that are known to cause cancer.  · Nicotine is an addictive chemical in smokeless tobacco that affects your brain.  · The benefits of not using smokeless tobacco include having a healthy mind and body and saving money.  · Tell your family and friends that you are quitting. Having support can make quitting easier.  · Ask your health care provider for help quitting smokeless tobacco. This may involve treatment.  This information is not intended to replace advice given to you by your health care provider. Make sure you discuss any questions you have with your health care provider.  Document Revised: 09/11/2020 Document Reviewed: 02/24/2020  [employer] Patient Education © 2020 [employer]  BMI for Adults  What is BMI?  Body mass index (BMI) is a number that is calculated from a person's weight and height. BMI can help estimate how much of a person's weight is composed of fat. BMI does not measure body fat directly. Rather, it is an alternative to procedures that directly measure body fat, which can be difficult and expensive.  BMI can help identify people who may be at higher risk for certain medical problems.  What are BMI measurements used for?  BMI is used as a screening tool to identify possible weight problems. It helps determine whether a person is obese, overweight, a healthy weight, or underweight.  BMI is useful for:  · Identifying a weight problem that may be related to a medical condition or may increase the risk for medical problems.  · Promoting changes, such as changes in diet and exercise, to help reach a healthy weight. BMI screening can be repeated to see if these changes are working.  How is BMI calculated?  BMI involves measuring your weight in relation to your height. Both height and weight are measured, and the BMI is calculated from those numbers. This can be done either in English ([place] or metric measurements. Note that  "charts and online BMI calculators are available to help you find your BMI quickly and easily without having to do these calculations yourself.  To calculate your BMI in English ([place] measurements:    1. Measure your weight in pounds (lb).  2. Multiply the number of pounds by 703.  ? For example, for a person who weighs 180 lb, multiply that number by 703, which equals 126,540.  3. Measure your height in inches. Then multiply that number by itself to get a measurement called \"inches squared.\"  ? For example, for a person who is 70 inches tall, the \"inches squared\" measurement is 70 inches x 70 inches, which equals 4,900 inches squared.  4. Divide the total from step 2 (number of lb x 703) by the total from step 3 (inches squared): 126,540 ÷ 4,900 = 25.8. This is your BMI.  To calculate your BMI in metric measurements:  1. Measure your weight in kilograms (kg).  2. Measure your height in meters (m). Then multiply that number by itself to get a measurement called \"meters squared.\"  ? For example, for a person who is 1.75 m tall, the \"meters squared\" measurement is 1.75 m x 1.75 m, which is equal to 3.1 meters squared.  3. Divide the number of kilograms (your weight) by the meters squared number. In this example: 70 ÷ 3.1 = 22.6. This is your BMI.  What do the results mean?  BMI charts are used to identify whether you are underweight, normal weight, overweight, or obese. The following guidelines will be used:  · Underweight: BMI less than 18.5.  · Normal weight: BMI between 18.5 and 24.9.  · Overweight: BMI between 25 and 29.9.  · Obese: BMI of 30 or above.  Keep these notes in mind:  · Weight includes both fat and muscle, so someone with a muscular build, such as an athlete, may have a BMI that is higher than 24.9. In cases like these, BMI is not an accurate measure of body fat.  · To determine if excess body fat is the cause of a BMI of 25 or higher, further assessments may need to be done by a health care " provider.  · BMI is usually interpreted in the same way for men and women.  Where to find more information  For more information about BMI, including tools to quickly calculate your BMI, go to these websites:  · Centers for Disease Control and Prevention: www.cdc.gov  · American Heart Association: www.heart.org  · National Heart, Lung, and Blood [place]: www.nhlbi.nih.gov  Summary  · Body mass index (BMI) is a number that is calculated from a person's weight and height.  · BMI may help estimate how much of a person's weight is composed of fat. BMI can help identify those who may be at higher risk for certain medical problems.  · BMI can be measured using English measurements or metric measurements.  · BMI charts are used to identify whether you are underweight, normal weight, overweight, or obese.  This information is not intended to replace advice given to you by your health care provider. Make sure you discuss any questions you have with your health care provider.  Document Revised: 09/09/2020 Document Reviewed: 07/17/2020  [employer] Patient Education © 2020 [employer]    Advance Care Planning and Advance Directives     You make decisions on a daily basis - decisions about where you want to live, your career, your home, your life. Perhaps one of the most important decisions you face is your choice for future medical care. Take time to talk with your family and your healthcare team and start planning today.  Advance Care Planning is a process that can help you:  · Understand possible future healthcare decisions in light of your own experiences  · Reflect on those decision in light of your goals and values  · Discuss your decisions with those closest to you and the healthcare professionals that care for you  · Make a plan by creating a document that reflects your wishes    Surrogate Decision Maker  In the event of a medical emergency, which has left you unable to communicate or to make your own decisions, you  would need someone to make decisions for you.  It is important to discuss your preferences for medical treatment with this person while you are in good health.     Qualities of a surrogate decision maker:  • Willing to take on this role and responsibility  • Knows what you want for future medical care  • Willing to follow your wishes even if they don't agree with them  • Able to make difficult medical decisions under stressful circumstances    Advance Directives  These are legal documents you can create that will guide your healthcare team and decision maker(s) when needed. These documents can be stored in the electronic medical record.    · Living Will - a legal document to guide your care if you have a terminal condition or a serious illness and are unable to communicate. States vary by statute in document names/types, but most forms may include one or more of the following:        -  Directions regarding life-prolonging treatments        -  Directions regarding artificially provided nutrition/hydration        -  Choosing a healthcare decision maker        -  Direction regarding organ/tissue donation    · Durable Power of  for Healthcare - this document names an -in-fact to make medical decisions for you, but it may also allow this person to make personal and financial decisions for you. Please seek the advice of an  if you need this type of document.    **Advance Directives are not required and no one may discriminate against you if you do not sign one.    Medical Orders  Many states allow specific forms/orders signed by your physician to record your wishes for medical treatment in your current state of health. This form, signed in personal communication with your physician, addresses resuscitation and other medical interventions that you may or may not want.      For more information or to schedule a time with a [facility] Advance Care Planning Facilitator contact:  [facility] or call [phone] and someone will contact you directly.  Edema    Edema is an abnormal buildup of fluids in the body tissues and under the skin. Swelling of the legs, feet, and ankles is a common symptom that becomes more likely as you get older. Swelling is also common in looser tissues, like around the eyes. When the affected area is squeezed, the fluid may move out of that spot and leave a dent for a few moments. This dent is called pitting edema.  There are many possible causes of edema. Eating too much salt (sodium) and being on your feet or sitting for a long time can cause edema in your legs, feet, and ankles. Hot weather may make edema worse. Common causes of edema include:  · Heart failure.  · Liver or kidney disease.  · Weak leg blood vessels.  · Cancer.  · An injury.  · Pregnancy.  · Medicines.  · Being obese.  · Low protein levels in the blood.  Edema is usually painless. Your skin may look swollen or shiny.  Follow these instructions at home:  · Keep the affected body part raised (elevated) above the level of your heart when you are sitting or lying down.  · Do not sit still or stand for long periods of time.  · Do not wear tight clothing. Do not wear garters on your upper legs.  · Exercise your legs to get your circulation going. This helps to move the fluid back into your blood vessels, and it may help the swelling go down.  · Wear elastic bandages or support stockings to reduce swelling as told by your health care provider.  · Eat a low-salt (low-sodium) diet to reduce fluid as told by your health care provider.  · Depending on the cause of your swelling, you may need to limit how much fluid you drink (fluid restriction).  · Take over-the-counter and prescription medicines only as told by your health care provider.  Contact a health care provider if:  · Your edema does not get better with treatment.  · You have heart, liver, or kidney disease and have symptoms of  edema.  · You have sudden and unexplained weight gain.  Get help right away if:  · You develop shortness of breath or chest pain.  · You cannot breathe when you lie down.  · You develop pain, redness, or warmth in the swollen areas.  · You have heart, liver, or kidney disease and suddenly get edema.  · You have a fever and your symptoms suddenly get worse.  Summary  · Edema is an abnormal buildup of fluids in the body tissues and under the skin.  · Eating too much salt (sodium) and being on your feet or sitting for a long time can cause edema in your legs, feet, and ankles.  · Keep the affected body part raised (elevated) above the level of your heart when you are sitting or lying down.  This information is not intended to replace advice given to you by your health care provider. Make sure you discuss any questions you have with your health care provider.  Document Revised: 05/06/2020 Document Reviewed: 01/20/2018  [employer] Patient Education © 2020 [employer]

## 2021-01-08 NOTE — PROGRESS NOTES
"Subjective   Cassy A May is a 75 y.o. female     Chief Complaint   Patient presents with   • Follow-up   • Coronary Artery Disease       HPI    PROBLEM LIST:     1. CAD.  1.1 Cardiac Cath. \"Failed\" PCI of the RCA. [facility]. Winter 2014- Intraprocedural closure of vessel. ILUMIEN-1 stent mid-LAD, 01/20/2014. [place], [place].  1.2 Nuclear stress test 8-7-18 with lateral wall ischemia  1.3 left heart catheter 11/16/18-2 stents to the right coronary artery, 30-50% ostial narrowing of the circumflex, EF 50-55%, left ventricular end of blood pressure 14  2. Shortness of Breath.  2.1 Echo 8-7-18 with Ef > 65%, mild DD, trace MR, trace TR, pulm. Pressures 30-35 mmHg, no pericardial effusion  3. Hyperlipidemia.   4.  Smoking habituation  5.  PVD  5.1 bilateral lower extremity arterial ultrasound 2/4/2020-diffuse arthrosis in both common femoral arteries without obstruction, stimulate mild atherosclerotic involvement of the superficial femoral arteries without stenosis, popliteal arteries are widely patent without aneurysmal dilation, distal vessels are patent, nonobstructive disease    Patient is a 75-year-old female who presents today for a follow-up.  She denies any chest pain, pressure, palpitations, fluttering, dizziness, presyncope, syncope, orthopnea or PND.  She says she has had swelling in her legs.  She also says that she is short of breath all the time and she feels like she has been this way for years and has not changed.  She does cough a lot and she says she has been getting a lot of phlegm up.  She says she will get very diaphoretic when she coughs a bit.  Patient has smoked for many years and has never seen a lung doctor.  She is still smoking about a pack and a half a day.    Current Outpatient Medications on File Prior to Visit   Medication Sig Dispense Refill   • aspirin 81 MG tablet Take 1 tablet by mouth Daily. 90 tablet 3   • nitroglycerin (NITROSTAT) 0.4 MG SL tablet Place 1 tablet under the " tongue Every 5 (Five) Minutes As Needed for Chest Pain. Take no more than 3 doses in 15 minutes. 25 tablet 6   • [DISCONTINUED] amLODIPine (NORVASC) 5 MG tablet Take 1 tablet by mouth Daily. 90 tablet 3   • [DISCONTINUED] Coenzyme Q10 (CO Q-10) 100 MG capsule Take 100 mg by mouth 2 (Two) Times a Day. (Patient taking differently: Take 1 capsule by mouth 2 (two) times a day.) 60 capsule 0   • [DISCONTINUED] furosemide (LASIX) 20 MG tablet Take 1 tablet by mouth Daily As Needed (edema). 90 tablet 3   • [DISCONTINUED] lisinopril (PRINIVIL,ZESTRIL) 40 MG tablet Take 1 tablet by mouth Daily. 30 tablet 6   • [DISCONTINUED] Pseudoephedrine-Guaifenesin (MUCINEX D PO) Take 1 tablet by mouth Daily.     • [DISCONTINUED] rosuvastatin (CRESTOR) 20 MG tablet Take 1 tablet by mouth Daily. 90 tablet 3     No current facility-administered medications on file prior to visit.        ALLERGIES    Patient has no known allergies.    Past Medical History:   Diagnosis Date   • Coronary artery disease    • Hyperlipidemia    • Hypertension        Social History     Socioeconomic History   • Marital status:      Spouse name: Not on file   • Number of children: Not on file   • Years of education: Not on file   • Highest education level: Not on file   Tobacco Use   • Smoking status: Current Every Day Smoker     Packs/day: 1.50     Years: 50.00     Pack years: 75.00     Types: Cigarettes   • Smokeless tobacco: Never Used   Substance and Sexual Activity   • Alcohol use: No   • Drug use: No   • Sexual activity: Defer       Family History   Problem Relation Age of Onset   • Hypertension Mother    • Heart disease Mother    • Diabetes Mother    • Heart attack Father        Review of Systems   Constitutional: Positive for diaphoresis (when coughs a lot ). Negative for appetite change, chills, fatigue and fever.   HENT: Positive for congestion (alot of congestion pt is taking meds to help). Negative for rhinorrhea and sore throat.    Eyes:  "Positive for visual disturbance (reading glasses).   Respiratory: Positive for cough (all the time pt states she smokes ) and shortness of breath (all the time pt states she has did this for years; all of the time ). Negative for chest tightness and wheezing.    Cardiovascular: Positive for leg swelling (mainly the left leg ,both ankles ). Negative for chest pain and palpitations.   Gastrointestinal: Negative for abdominal pain, blood in stool, constipation, diarrhea, nausea and vomiting.   Endocrine: Negative for cold intolerance and heat intolerance.   Genitourinary: Negative for difficulty urinating, dysuria, frequency, hematuria and urgency.   Musculoskeletal: Positive for arthralgias and back pain (lower back ). Negative for joint swelling, neck pain and neck stiffness.   Skin: Negative for rash and wound.   Allergic/Immunologic: Negative for environmental allergies and food allergies.   Neurological: Negative for dizziness, syncope and light-headedness.   Hematological: Bruises/bleeds easily (pt states yes).   Psychiatric/Behavioral: Negative for sleep disturbance.       Objective   /76   Pulse 84   Temp 97.1 °F (36.2 °C)   Ht 157.5 cm (62\")   Wt 79.4 kg (175 lb)   SpO2 93%   BMI 32.01 kg/m²   Vitals:    01/08/21 0807   BP: 149/76   Pulse: 84   Temp: 97.1 °F (36.2 °C)   SpO2: 93%   Weight: 79.4 kg (175 lb)   Height: 157.5 cm (62\")      Lab Results (most recent)     None        Physical Exam  Vitals signs reviewed.   Constitutional:       General: She is awake.      Appearance: Normal appearance. She is well-developed and well-groomed. She is obese.   HENT:      Head: Normocephalic.   Eyes:      General: Lids are normal.   Neck:      Vascular: No carotid bruit, hepatojugular reflux or JVD.   Cardiovascular:      Rate and Rhythm: Normal rate and regular rhythm.      Pulses:           Radial pulses are 2+ on the right side and 2+ on the left side.        Dorsalis pedis pulses are 2+ on the right side " and 2+ on the left side.        Posterior tibial pulses are 2+ on the right side and 2+ on the left side.      Heart sounds: Normal heart sounds.   Pulmonary:      Effort: Pulmonary effort is normal.      Breath sounds: Normal breath sounds and air entry.   Abdominal:      General: Bowel sounds are normal.      Palpations: Abdomen is soft.   Musculoskeletal:      Right lower le+ Pitting Edema present.      Left lower le+ Pitting Edema present.   Skin:     General: Skin is warm and dry.   Neurological:      Mental Status: She is alert and oriented to person, place, and time.   Psychiatric:         Attention and Perception: Attention and perception normal.         Mood and Affect: Mood and affect normal.         Speech: Speech normal.         Behavior: Behavior normal. Behavior is cooperative.         Thought Content: Thought content normal.         Cognition and Memory: Cognition and memory normal.         Judgment: Judgment normal.         Procedure     ECG 12 Lead    Date/Time: 2021 8:34 AM  Performed by: Radha Carranza APRN  Authorized by: Radha Carranza APRN   Comparison: compared with previous ECG from 2020  Similar to previous ECG  Rhythm: sinus rhythm  Rate: normal  BPM: 82  Conduction: right bundle branch block  QRS axis: normal    Clinical impression: abnormal EKG                 Assessment/Plan      Diagnosis Plan   1. Coronary artery disease involving native coronary artery of native heart with angina pectoris (CMS/HCC)  amLODIPine (NORVASC) 5 MG tablet    rosuvastatin (CRESTOR) 20 MG tablet    ECG 12 Lead    Lipid Panel   2. Shortness of breath  amLODIPine (NORVASC) 5 MG tablet    rosuvastatin (CRESTOR) 20 MG tablet    Ambulatory Referral to Pulmonology    BNP   3. Mixed hyperlipidemia  Coenzyme Q10 (Co Q-10) 100 MG capsule    amLODIPine (NORVASC) 5 MG tablet    rosuvastatin (CRESTOR) 20 MG tablet    Lipid Panel   4. Essential hypertension  lisinopril (PRINIVIL,ZESTRIL) 40 MG tablet       amLODIPine (NORVASC) 5 MG tablet    ECG 12 Lead    Comprehensive Metabolic Panel    CBC & Differential   5. Smoking  Ambulatory Referral to Pulmonology   6. Peripheral edema  furosemide (LASIX) 20 MG tablet    potassium chloride 10 MEQ CR tablet    Magnesium    BNP   7. Grade I diastolic dysfunction  potassium chloride 10 MEQ CR tablet    Magnesium    BNP   8. Asymptomatic PVD (peripheral vascular disease) (CMS/[facility])     9. Precordial pain  amLODIPine (NORVASC) 5 MG tablet    rosuvastatin (CRESTOR) 20 MG tablet   10. Congestion of upper airway  dextromethorphan-guaifenesin (MUCINEX DM MAX STRENGTH)  MG per 12 hr tablet   11. Healthcare maintenance  Comprehensive Metabolic Panel    Magnesium    CBC & Differential    Lipid Panel       Return in about 6 months (around 7/8/2021).  CAD-patient's on aspirin and statin.  Shortness of breath/smoking-stable and referring patient to Dr. Goodrich per her request.  Hyperlipidemia-patient is on Crestor.  We are going to order a lipid panel.  She is due to come in next week.  Hypertension-patient's blood pressure slightly elevated today but she does have a bit of swelling.  She typically does well with her amlodipine and lisinopril.  Peripheral edema/diastolic dysfunction-patient will take 2 of Lasix with 10 a potassium for the next 3 days and then she will return to her normal dosing.  PVD-patient's on aspirin and statin.  Chest congestion-refill patient's Mucinex.  She will come in next week for a CMP, magnesium, BNP, CBC and lipid panel.  She will continue her medication regimen with above-noted changes.  She will follow-up in 6 months or sooner if any changes.    When we call her with her lab results we will get update on her swelling.         Cassy Olvera  reports that she has been smoking cigarettes. She has a 75.00 pack-year smoking history. She has never used smokeless tobacco.. I have educated her on the risk of diseases from using tobacco products such as  cancer, COPD and heart disease.     I advised her to quit and she is not willing to quit.    I spent 3  minutes counseling the patient.         Patient's Body mass index is 32.01 kg/m². BMI is above normal parameters. Recommendations include: educational material.      Electronically signed by:

## 2021-01-11 ENCOUNTER — LAB (OUTPATIENT)
Dept: LAB | Facility: HOSPITAL | Age: 76
End: 2021-01-11

## 2021-01-11 ENCOUNTER — TELEPHONE (OUTPATIENT)
Dept: CARDIOLOGY | Facility: CLINIC | Age: 76
End: 2021-01-11

## 2021-01-11 DIAGNOSIS — I51.89 GRADE I DIASTOLIC DYSFUNCTION: ICD-10-CM

## 2021-01-11 DIAGNOSIS — R06.02 SHORTNESS OF BREATH: ICD-10-CM

## 2021-01-11 DIAGNOSIS — I10 ESSENTIAL HYPERTENSION: ICD-10-CM

## 2021-01-11 DIAGNOSIS — Z00.00 HEALTHCARE MAINTENANCE: ICD-10-CM

## 2021-01-11 DIAGNOSIS — E78.2 MIXED HYPERLIPIDEMIA: ICD-10-CM

## 2021-01-11 DIAGNOSIS — I25.119 CORONARY ARTERY DISEASE INVOLVING NATIVE CORONARY ARTERY OF NATIVE HEART WITH ANGINA PECTORIS (HCC): ICD-10-CM

## 2021-01-11 DIAGNOSIS — R60.9 PERIPHERAL EDEMA: ICD-10-CM

## 2021-01-11 LAB
ALBUMIN SERPL-MCNC: 4.52 G/DL (ref 3.5–5.2)
ALBUMIN/GLOB SERPL: 1.6 G/DL
ALP SERPL-CCNC: 73 U/L (ref 39–117)
ALT SERPL W P-5'-P-CCNC: 13 U/L (ref 1–33)
ANION GAP SERPL CALCULATED.3IONS-SCNC: 13.6 MMOL/L (ref 5–15)
AST SERPL-CCNC: 17 U/L (ref 1–32)
BASOPHILS # BLD AUTO: 0.06 10*3/MM3 (ref 0–0.2)
BASOPHILS NFR BLD AUTO: 0.7 % (ref 0–1.5)
BILIRUB SERPL-MCNC: 0.4 MG/DL (ref 0–1.2)
BUN SERPL-MCNC: 13 MG/DL (ref 8–23)
BUN/CREAT SERPL: 16 (ref 7–25)
CALCIUM SPEC-SCNC: 9.7 MG/DL (ref 8.6–10.5)
CHLORIDE SERPL-SCNC: 93 MMOL/L (ref 98–107)
CHOLEST SERPL-MCNC: 118 MG/DL (ref 0–200)
CO2 SERPL-SCNC: 26.4 MMOL/L (ref 22–29)
CREAT SERPL-MCNC: 0.81 MG/DL (ref 0.57–1)
DEPRECATED RDW RBC AUTO: 43.4 FL (ref 37–54)
EOSINOPHIL # BLD AUTO: 0.06 10*3/MM3 (ref 0–0.4)
EOSINOPHIL NFR BLD AUTO: 0.7 % (ref 0.3–6.2)
ERYTHROCYTE [DISTWIDTH] IN BLOOD BY AUTOMATED COUNT: 12.3 % (ref 12.3–15.4)
GFR SERPL CREATININE-BSD FRML MDRD: 69 ML/MIN/1.73
GLOBULIN UR ELPH-MCNC: 2.9 GM/DL
GLUCOSE SERPL-MCNC: 95 MG/DL (ref 65–99)
HCT VFR BLD AUTO: 48.3 % (ref 34–46.6)
HDLC SERPL-MCNC: 64 MG/DL (ref 40–60)
HGB BLD-MCNC: 16.1 G/DL (ref 12–15.9)
IMM GRANULOCYTES # BLD AUTO: 0.01 10*3/MM3 (ref 0–0.05)
IMM GRANULOCYTES NFR BLD AUTO: 0.1 % (ref 0–0.5)
LDLC SERPL CALC-MCNC: 41 MG/DL (ref 0–100)
LDLC/HDLC SERPL: 0.65 {RATIO}
LYMPHOCYTES # BLD AUTO: 2.35 10*3/MM3 (ref 0.7–3.1)
LYMPHOCYTES NFR BLD AUTO: 26.9 % (ref 19.6–45.3)
MAGNESIUM SERPL-MCNC: 2.3 MG/DL (ref 1.6–2.4)
MCH RBC QN AUTO: 31.9 PG (ref 26.6–33)
MCHC RBC AUTO-ENTMCNC: 33.3 G/DL (ref 31.5–35.7)
MCV RBC AUTO: 95.6 FL (ref 79–97)
MONOCYTES # BLD AUTO: 0.68 10*3/MM3 (ref 0.1–0.9)
MONOCYTES NFR BLD AUTO: 7.8 % (ref 5–12)
NEUTROPHILS NFR BLD AUTO: 5.56 10*3/MM3 (ref 1.7–7)
NEUTROPHILS NFR BLD AUTO: 63.8 % (ref 42.7–76)
NRBC BLD AUTO-RTO: 0 /100 WBC (ref 0–0.2)
NT-PROBNP SERPL-MCNC: 43.2 PG/ML (ref 0–1800)
PLATELET # BLD AUTO: 335 10*3/MM3 (ref 140–450)
PMV BLD AUTO: 10 FL (ref 6–12)
POTASSIUM SERPL-SCNC: 4.5 MMOL/L (ref 3.5–5.2)
PROT SERPL-MCNC: 7.4 G/DL (ref 6–8.5)
RBC # BLD AUTO: 5.05 10*6/MM3 (ref 3.77–5.28)
SODIUM SERPL-SCNC: 133 MMOL/L (ref 136–145)
TRIGL SERPL-MCNC: 61 MG/DL (ref 0–150)
VLDLC SERPL-MCNC: 13 MG/DL (ref 5–40)
WBC # BLD AUTO: 8.72 10*3/MM3 (ref 3.4–10.8)

## 2021-01-11 PROCEDURE — 80053 COMPREHEN METABOLIC PANEL: CPT

## 2021-01-11 PROCEDURE — 85025 COMPLETE CBC W/AUTO DIFF WBC: CPT

## 2021-01-11 PROCEDURE — 83735 ASSAY OF MAGNESIUM: CPT

## 2021-01-11 PROCEDURE — 80061 LIPID PANEL: CPT

## 2021-01-11 PROCEDURE — 36415 COLL VENOUS BLD VENIPUNCTURE: CPT

## 2021-01-11 PROCEDURE — 83880 ASSAY OF NATRIURETIC PEPTIDE: CPT

## 2021-01-11 NOTE — TELEPHONE ENCOUNTER
Advised pt of lab results , pt gave verbalized understanding    Mago Cintron / GINA          ----- Message from SHEILA James sent at 1/11/2021  4:04 PM EST -----  Please advise patient of labs, cholesterol good.  Sodium just 3 points low.  HGB/HCT still elevated, prob due to smoking.  Forwarded labs to PCP.

## 2021-03-30 DIAGNOSIS — R07.2 PRECORDIAL PAIN: ICD-10-CM

## 2021-03-30 DIAGNOSIS — R06.02 SHORTNESS OF BREATH: ICD-10-CM

## 2021-03-30 DIAGNOSIS — I25.119 CORONARY ARTERY DISEASE INVOLVING NATIVE CORONARY ARTERY OF NATIVE HEART WITH ANGINA PECTORIS (HCC): ICD-10-CM

## 2021-03-30 DIAGNOSIS — E78.2 MIXED HYPERLIPIDEMIA: ICD-10-CM

## 2021-03-31 RX ORDER — ROSUVASTATIN CALCIUM 20 MG/1
TABLET, COATED ORAL
Qty: 90 TABLET | Refills: 3 | Status: SHIPPED | OUTPATIENT
Start: 2021-03-31 | End: 2022-01-14 | Stop reason: SDUPTHER

## 2021-07-09 ENCOUNTER — OFFICE VISIT (OUTPATIENT)
Dept: CARDIOLOGY | Facility: CLINIC | Age: 76
End: 2021-07-09

## 2021-07-09 VITALS
HEART RATE: 80 BPM | BODY MASS INDEX: 28.61 KG/M2 | WEIGHT: 178 LBS | OXYGEN SATURATION: 94 % | DIASTOLIC BLOOD PRESSURE: 71 MMHG | TEMPERATURE: 97.1 F | HEIGHT: 66 IN | SYSTOLIC BLOOD PRESSURE: 143 MMHG

## 2021-07-09 DIAGNOSIS — R07.89 OTHER CHEST PAIN: Primary | ICD-10-CM

## 2021-07-09 DIAGNOSIS — I10 ESSENTIAL HYPERTENSION: ICD-10-CM

## 2021-07-09 DIAGNOSIS — F17.200 SMOKING: ICD-10-CM

## 2021-07-09 DIAGNOSIS — E78.2 MIXED HYPERLIPIDEMIA: ICD-10-CM

## 2021-07-09 DIAGNOSIS — I73.9 ASYMPTOMATIC PVD (PERIPHERAL VASCULAR DISEASE) (HCC): ICD-10-CM

## 2021-07-09 DIAGNOSIS — R06.02 SHORTNESS OF BREATH: ICD-10-CM

## 2021-07-09 DIAGNOSIS — I25.119 CORONARY ARTERY DISEASE INVOLVING NATIVE CORONARY ARTERY OF NATIVE HEART WITH ANGINA PECTORIS (HCC): ICD-10-CM

## 2021-07-09 DIAGNOSIS — R60.9 PERIPHERAL EDEMA: ICD-10-CM

## 2021-07-09 DIAGNOSIS — R42 DIZZINESS: ICD-10-CM

## 2021-07-09 DIAGNOSIS — I51.89 GRADE I DIASTOLIC DYSFUNCTION: ICD-10-CM

## 2021-07-09 PROCEDURE — 99214 OFFICE O/P EST MOD 30 MIN: CPT | Performed by: NURSE PRACTITIONER

## 2021-07-09 RX ORDER — FUROSEMIDE 20 MG/1
20 TABLET ORAL DAILY
Qty: 30 TABLET | Refills: 11
Start: 2021-07-09 | End: 2021-12-30

## 2021-07-09 RX ORDER — HYDROCHLOROTHIAZIDE 12.5 MG/1
12.5 CAPSULE, GELATIN COATED ORAL DAILY
COMMUNITY
End: 2021-07-09 | Stop reason: SDUPTHER

## 2021-07-09 RX ORDER — HYDROCHLOROTHIAZIDE 12.5 MG/1
12.5 CAPSULE, GELATIN COATED ORAL DAILY PRN
Qty: 30 CAPSULE | Refills: 11 | Status: SHIPPED | OUTPATIENT
Start: 2021-07-09 | End: 2022-01-14 | Stop reason: SDUPTHER

## 2021-07-09 NOTE — PROGRESS NOTES
"Subjective   Cassy A May is a 75 y.o. female     Chief Complaint   Patient presents with   • Follow-up   • Coronary Artery Disease       HPI    PROBLEM LIST:     1. CAD.  1.1 Cardiac Cath. \"Failed\" PCI of the RCA. [facility]. Winter 2014- Intraprocedural closure of vessel. ILUMIEN-1 stent mid-LAD, 01/20/2014. [place], [place].  1.2 Nuclear stress test 8-7-18 with lateral wall ischemia  1.3 left heart catheter 11/16/18-2 stents to the right coronary artery, 30-50% ostial narrowing of the circumflex, EF 50-55%, left ventricular end of blood pressure 14  2. Shortness of Breath.  2.1 Echo 8-7-18 with Ef > 65%, mild DD, trace MR, trace TR, pulm. Pressures 30-35 mmHg, no pericardial effusion  3. Hyperlipidemia.   4.  Smoking habituation  5.  PVD  5.1 bilateral lower extremity arterial ultrasound 2/4/2020-diffuse arthrosis in both common femoral arteries without obstruction, stimulate mild atherosclerotic involvement of the superficial femoral arteries without stenosis, popliteal arteries are widely patent without aneurysmal dilation, distal vessels are patent, nonobstructive disease    Patient is a 75-year-old female who presents today for follow-up.  She says she will have what she describes as left anterior chest pressure gets there for second or 2 and then goes away.  She says it occurs mostly just with rest.  She has no other symptoms when it occurs.  She does not use nitroglycerin.  She denies any palpitations, fluttering, presyncope, syncope, orthopnea or PND.  She says she does get dizzy if she gets up too fast but then it resolves.  She says she gets swelling just mostly in her left leg.  Patient says that she has shortness of breath all the time and she did see Dr. Goodrich soon she is using oxygen at night and as needed during the day.  She is always fatigued.  Patient still smokes a pack and a half a day.    Current Outpatient Medications on File Prior to Visit   Medication Sig Dispense Refill   • " amLODIPine (NORVASC) 5 MG tablet Take 1 tablet by mouth Daily. 90 tablet 3   • aspirin 81 MG tablet Take 1 tablet by mouth Daily. 90 tablet 3   • Coenzyme Q10 (Co Q-10) 100 MG capsule Take 100 mg by mouth 2 (two) times a day. 180 capsule 3   • furosemide (LASIX) 20 MG tablet Take 2 tablets by mouth for 3 days then return to 1 tablet by mouth daily (Patient taking differently: 20 mg Daily.) 96 tablet 3   • lisinopril (PRINIVIL,ZESTRIL) 40 MG tablet Take 1 tablet by mouth Daily. 90 tablet 3   • nitroglycerin (NITROSTAT) 0.4 MG SL tablet Place 1 tablet under the tongue Every 5 (Five) Minutes As Needed for Chest Pain. Take no more than 3 doses in 15 minutes. 25 tablet 6   • rosuvastatin (CRESTOR) 20 MG tablet TAKE 1 TABLET BY MOUTH ONCE DAILY 90 tablet 3   • [DISCONTINUED] hydroCHLOROthiazide (MICROZIDE) 12.5 MG capsule Take 12.5 mg by mouth Daily. Pt had a few left so she took it for     • [DISCONTINUED] potassium chloride 10 MEQ CR tablet Take 1 tablet by mouth for 3 days with additional tablet of lasix 6 tablet 3     No current facility-administered medications on file prior to visit.       ALLERGIES    Patient has no known allergies.    Past Medical History:   Diagnosis Date   • COPD (chronic obstructive pulmonary disease) (CMS/[facility])    • Coronary artery disease    • COVID-19 vaccine administered 1st- 04/2021 2nd- 05/2021 - Moderna    • Hyperlipidemia    • Hypertension        Social History     Socioeconomic History   • Marital status:      Spouse name: Not on file   • Number of children: Not on file   • Years of education: Not on file   • Highest education level: Not on file   Tobacco Use   • Smoking status: Current Every Day Smoker     Packs/day: 1.50     Years: 50.00     Pack years: 75.00     Types: Cigarettes   • Smokeless tobacco: Never Used   Substance and Sexual Activity   • Alcohol use: No   • Drug use: No   • Sexual activity: Defer       Family History   Problem Relation Age of Onset   •  "Hypertension Mother    • Heart disease Mother    • Diabetes Mother    • Heart attack Father        Review of Systems   Constitutional: Positive for fatigue (always tired ). Negative for appetite change, chills, diaphoresis and fever.   HENT: Negative for congestion, rhinorrhea and sore throat.    Eyes: Positive for visual disturbance (reading glasses ).   Respiratory: Positive for cough (alot of ( phlem) due to smoking ), chest tightness (once in awhile underneath her left breast ( pressure) it does not last long ), shortness of breath (walking at any distance; uses O2 2L NC @ HS AND PRN ) and wheezing (all the time ).    Cardiovascular: Positive for chest pain (left anterior pressure; will be there for a second or 2 and then go away, occurs mostly with rest ) and leg swelling (legs, feet and ankles ( left leg) ). Negative for palpitations.   Gastrointestinal: Negative for abdominal pain, blood in stool, constipation, diarrhea, nausea and vomiting.   Endocrine: Negative for cold intolerance and heat intolerance.   Genitourinary: Positive for frequency (due to water pills ). Negative for difficulty urinating, dysuria, hematuria and urgency.   Musculoskeletal: Positive for back pain (lower ). Negative for arthralgias, joint swelling, neck pain and neck stiffness.   Skin: Negative for color change, pallor, rash and wound.   Allergic/Immunologic: Negative for environmental allergies and food allergies.   Neurological: Positive for dizziness (when she gets up to fast ) and numbness (right hand ( numbness) ). Negative for weakness, light-headedness and headaches.   Hematological: Bruises/bleeds easily (Brusies and Bleeds ).   Psychiatric/Behavioral: Positive for sleep disturbance (Cpac with Oxygen ).       Objective   /71 (BP Location: Left arm)   Pulse 80   Temp 97.1 °F (36.2 °C)   Ht 167.6 cm (66\")   Wt 80.7 kg (178 lb)   SpO2 94%   BMI 28.73 kg/m²   Vitals:    07/09/21 0828   BP: 143/71   BP Location: Left " "arm   Pulse: 80   Temp: 97.1 °F (36.2 °C)   SpO2: 94%   Weight: 80.7 kg (178 lb)   Height: 167.6 cm (66\")      Lab Results (most recent)     None        Physical Exam  Vitals reviewed.   Constitutional:       General: She is awake.      Appearance: Normal appearance. She is well-developed, well-groomed and overweight.   HENT:      Head: Normocephalic.   Eyes:      General: Lids are normal.   Neck:      Vascular: No carotid bruit, hepatojugular reflux or JVD.   Cardiovascular:      Rate and Rhythm: Normal rate and regular rhythm.      Pulses:           Radial pulses are 2+ on the right side and 2+ on the left side.        Dorsalis pedis pulses are 2+ on the right side and 2+ on the left side.        Posterior tibial pulses are 2+ on the right side and 2+ on the left side.      Heart sounds: Normal heart sounds.   Pulmonary:      Effort: Pulmonary effort is normal.      Breath sounds: Normal breath sounds and air entry.   Abdominal:      General: Bowel sounds are normal.      Palpations: Abdomen is soft.   Musculoskeletal:      Right lower leg: No edema.      Left lower leg: No edema.   Skin:     General: Skin is warm and dry.      Comments: Purplish discoloration of toes bilaterally    Neurological:      Mental Status: She is alert and oriented to person, place, and time.   Psychiatric:         Attention and Perception: Attention and perception normal.         Mood and Affect: Mood and affect normal.         Speech: Speech normal.         Behavior: Behavior normal. Behavior is cooperative.         Thought Content: Thought content normal.         Cognition and Memory: Cognition and memory normal.         Judgment: Judgment normal.         Procedure   Procedures         Assessment/Plan      Diagnosis Plan   1. Other chest pain     2. Coronary artery disease involving native coronary artery of native heart with angina pectoris (CMS/HCC)     3. Essential hypertension     4. Grade I diastolic dysfunction  " hydroCHLOROthiazide (MICROZIDE) 12.5 MG capsule   5. Mixed hyperlipidemia     6. Shortness of breath     7. Peripheral edema  hydroCHLOROthiazide (MICROZIDE) 12.5 MG capsule   8. Smoking     9. Asymptomatic PVD (peripheral vascular disease) (CMS/HCC)     10. Dizziness         Return in about 6 months (around 1/9/2022).    Chest pain-recommend patient have an ischemia work-up as she is not had one since 2018 however she feels like she has other testing that she needs to have before therefore she would like to wait as she is not concerned.  I did encourage her to use nitro as needed for chest pain no resolution to go to the ER.  CAD-patient's on aspirin and statin.  Hyper lipidemia-patient's on Crestor and doing well.  Her last LDL was 41.  Diastolic dysfunction/peripheral edema-patient's on Lasix and is going to use hydrochlorothiazide just as needed.  Shortness of breath-stable and seeing Dr. Goodrich.  PVD-patient's on aspirin and statin.  Dizziness-stable.  She will continue her medication regimen with above-noted change.  She will follow-up in 6 months or sooner should she decide she like to have an ischemia work-up.    Cassy Olvera  reports that she has been smoking cigarettes. She has a 75.00 pack-year smoking history. She has never used smokeless tobacco.. Advance Care Planning   ACP discussion was declined by the patient. Patient has an advance directive (not in EMR), copy requested. Patient brought in medicine list to appointment, it's been reviewed with patient and med list was updated in the chart.     Electronically signed by:

## 2021-07-09 NOTE — PATIENT INSTRUCTIONS
Advance Care Planning and Advance Directives     You make decisions on a daily basis - decisions about where you want to live, your career, your home, your life. Perhaps one of the most important decisions you face is your choice for future medical care. Take time to talk with your family and your healthcare team and start planning today.  Advance Care Planning is a process that can help you:  · Understand possible future healthcare decisions in light of your own experiences  · Reflect on those decision in light of your goals and values  · Discuss your decisions with those closest to you and the healthcare professionals that care for you  · Make a plan by creating a document that reflects your wishes    Surrogate Decision Maker  In the event of a medical emergency, which has left you unable to communicate or to make your own decisions, you would need someone to make decisions for you.  It is important to discuss your preferences for medical treatment with this person while you are in good health.     Qualities of a surrogate decision maker:  • Willing to take on this role and responsibility  • Knows what you want for future medical care  • Willing to follow your wishes even if they don't agree with them  • Able to make difficult medical decisions under stressful circumstances    Advance Directives  These are legal documents you can create that will guide your healthcare team and decision maker(s) when needed. These documents can be stored in the electronic medical record.    · Living Will - a legal document to guide your care if you have a terminal condition or a serious illness and are unable to communicate. States vary by statute in document names/types, but most forms may include one or more of the following:        -  Directions regarding life-prolonging treatments        -  Directions regarding artificially provided nutrition/hydration        -  Choosing a healthcare decision maker        -  Direction  regarding organ/tissue donation    · Durable Power of  for Healthcare - this document names an -in-fact to make medical decisions for you, but it may also allow this person to make personal and financial decisions for you. Please seek the advice of an  if you need this type of document.    **Advance Directives are not required and no one may discriminate against you if you do not sign one.    Medical Orders  Many states allow specific forms/orders signed by your physician to record your wishes for medical treatment in your current state of health. This form, signed in personal communication with your physician, addresses resuscitation and other medical interventions that you may or may not want.      For more information or to schedule a time with a [facility] Advance Care Planning Facilitator contact: [email or web address]  Edema    Edema is an abnormal buildup of fluids in the body tissues and under the skin. Swelling of the legs, feet, and ankles is a common symptom that becomes more likely as you get older. Swelling is also common in looser tissues, like around the eyes. When the affected area is squeezed, the fluid may move out of that spot and leave a dent for a few moments. This dent is called pitting edema.  There are many possible causes of edema. Eating too much salt (sodium) and being on your feet or sitting for a long time can cause edema in your legs, feet, and ankles. Hot weather may make edema worse. Common causes of edema include:  · Heart failure.  · Liver or kidney disease.  · Weak leg blood vessels.  · Cancer.  · An injury.  · Pregnancy.  · Medicines.  · Being obese.  · Low protein levels in the blood.  Edema is usually painless. Your skin may look swollen or shiny.  Follow these instructions at home:  · Keep the affected body part raised (elevated) above the level of your heart when you are sitting or lying down.  · Do not sit still or stand for long periods of time.  · Do  not wear tight clothing. Do not wear garters on your upper legs.  · Exercise your legs to get your circulation going. This helps to move the fluid back into your blood vessels, and it may help the swelling go down.  · Wear elastic bandages or support stockings to reduce swelling as told by your health care provider.  · Eat a low-salt (low-sodium) diet to reduce fluid as told by your health care provider.  · Depending on the cause of your swelling, you may need to limit how much fluid you drink (fluid restriction).  · Take over-the-counter and prescription medicines only as told by your health care provider.  Contact a health care provider if:  · Your edema does not get better with treatment.  · You have heart, liver, or kidney disease and have symptoms of edema.  · You have sudden and unexplained weight gain.  Get help right away if:  · You develop shortness of breath or chest pain.  · You cannot breathe when you lie down.  · You develop pain, redness, or warmth in the swollen areas.  · You have heart, liver, or kidney disease and suddenly get edema.  · You have a fever and your symptoms suddenly get worse.  Summary  · Edema is an abnormal buildup of fluids in the body tissues and under the skin.  · Eating too much salt (sodium) and being on your feet or sitting for a long time can cause edema in your legs, feet, and ankles.  · Keep the affected body part raised (elevated) above the level of your heart when you are sitting or lying down.  This information is not intended to replace advice given to you by your health care provider. Make sure you discuss any questions you have with your health care provider.  Document Revised: 05/06/2020 Document Reviewed: 01/20/2018  [employer] Patient Education © 2021 [employer]  m/ACP or call [phone] and someone will contact you directly.

## 2021-12-30 DIAGNOSIS — R60.9 PERIPHERAL EDEMA: ICD-10-CM

## 2021-12-30 RX ORDER — FUROSEMIDE 20 MG/1
20 TABLET ORAL DAILY
Qty: 90 TABLET | Refills: 3 | Status: SHIPPED | OUTPATIENT
Start: 2021-12-30 | End: 2023-02-09 | Stop reason: SDUPTHER

## 2022-01-14 ENCOUNTER — OFFICE VISIT (OUTPATIENT)
Dept: CARDIOLOGY | Facility: CLINIC | Age: 77
End: 2022-01-14

## 2022-01-14 VITALS
SYSTOLIC BLOOD PRESSURE: 146 MMHG | HEART RATE: 72 BPM | BODY MASS INDEX: 30.73 KG/M2 | OXYGEN SATURATION: 95 % | DIASTOLIC BLOOD PRESSURE: 80 MMHG | TEMPERATURE: 97.2 F | WEIGHT: 180 LBS | HEIGHT: 64 IN

## 2022-01-14 DIAGNOSIS — R06.02 SHORTNESS OF BREATH: ICD-10-CM

## 2022-01-14 DIAGNOSIS — R60.9 PERIPHERAL EDEMA: ICD-10-CM

## 2022-01-14 DIAGNOSIS — E78.2 MIXED HYPERLIPIDEMIA: ICD-10-CM

## 2022-01-14 DIAGNOSIS — I25.119 CORONARY ARTERY DISEASE INVOLVING NATIVE CORONARY ARTERY OF NATIVE HEART WITH ANGINA PECTORIS: Primary | ICD-10-CM

## 2022-01-14 DIAGNOSIS — I10 ESSENTIAL HYPERTENSION: ICD-10-CM

## 2022-01-14 DIAGNOSIS — F17.200 SMOKING: ICD-10-CM

## 2022-01-14 DIAGNOSIS — I51.89 GRADE I DIASTOLIC DYSFUNCTION: ICD-10-CM

## 2022-01-14 DIAGNOSIS — R42 DIZZINESS: ICD-10-CM

## 2022-01-14 PROBLEM — J44.9 COPD (CHRONIC OBSTRUCTIVE PULMONARY DISEASE): Status: ACTIVE | Noted: 2022-01-14

## 2022-01-14 PROCEDURE — 99214 OFFICE O/P EST MOD 30 MIN: CPT | Performed by: NURSE PRACTITIONER

## 2022-01-14 PROCEDURE — 93000 ELECTROCARDIOGRAM COMPLETE: CPT | Performed by: NURSE PRACTITIONER

## 2022-01-14 RX ORDER — HYDROCHLOROTHIAZIDE 12.5 MG/1
12.5 CAPSULE, GELATIN COATED ORAL DAILY PRN
Qty: 30 CAPSULE | Refills: 11 | Status: SHIPPED | OUTPATIENT
Start: 2022-01-14 | End: 2022-08-03

## 2022-01-14 RX ORDER — AMLODIPINE BESYLATE 5 MG/1
5 TABLET ORAL DAILY
Qty: 90 TABLET | Refills: 3 | Status: SHIPPED | OUTPATIENT
Start: 2022-01-14 | End: 2023-02-06

## 2022-01-14 RX ORDER — LISINOPRIL 40 MG/1
40 TABLET ORAL DAILY
Qty: 90 TABLET | Refills: 3 | Status: SHIPPED | OUTPATIENT
Start: 2022-01-14 | End: 2023-02-09 | Stop reason: SDUPTHER

## 2022-01-14 RX ORDER — ROSUVASTATIN CALCIUM 20 MG/1
20 TABLET, COATED ORAL DAILY
Qty: 90 TABLET | Refills: 3 | Status: SHIPPED | OUTPATIENT
Start: 2022-01-14 | End: 2023-03-22 | Stop reason: SDUPTHER

## 2022-01-14 RX ORDER — DIMENHYDRINATE 50 MG
1 TABLET ORAL 2 TIMES DAILY
Qty: 180 CAPSULE | Refills: 3 | Status: SHIPPED | OUTPATIENT
Start: 2022-01-14

## 2022-01-14 NOTE — PROGRESS NOTES
"Subjective   Cassy A May is a 76 y.o. female     Chief Complaint   Patient presents with   • Follow-up   • Chest Pain       HPI    PROBLEM LIST:     1. CAD.  1.1 Cardiac Cath. \"Failed\" PCI of the RCA. [facility]. Winter 2014- Intraprocedural closure of vessel. ILUMIEN-1 stent mid-LAD, 01/20/2014. [place], [place].  1.2 Nuclear stress test 8-7-18 with lateral wall ischemia  1.3 left heart catheter 11/16/18-2 stents to the right coronary artery, 30-50% ostial narrowing of the circumflex, EF 50-55%, left ventricular end of blood pressure 14  2. Shortness of Breath.  2.1 Echo 8-7-18 with Ef > 65%, mild DD, trace MR, trace TR, pulm. Pressures 30-35 mmHg, no pericardial effusion  3. Hyperlipidemia.   4.  Smoking habituation  5.  PVD  5.1 bilateral lower extremity arterial ultrasound 2/4/2020-diffuse arthrosis in both common femoral arteries without obstruction, stimulate mild atherosclerotic involvement of the superficial femoral arteries without stenosis, popliteal arteries are widely patent without aneurysmal dilation, distal vessels are patent, nonobstructive disease  6.  COPD, Dr. Goodrich    Patient is a 76-year-old female who presents today for follow-up.  She denies any chest pain, pressure, palpitations, fluttering, presyncope, syncope, orthopnea or PND.  She says she does have some dizziness when she lays back and raises up.  Patient says she also has swelling in her legs and her left leg is larger than her right leg.  Her left leg is larger however the swelling is very minimal.  She also does not have any warmness or redness for indication of possible blood clot.  She has some tenderness but she has run very hard into the muscle.  She does have shortness of breath with activity.  She was prescribed oxygen at night for by Dr. Goodrich but she says she was intolerant.  She had an appointment with Dr. Goodrich in the past but she had to reschedule.    Current Outpatient Medications on File Prior to Visit   Medication " Sig Dispense Refill   • aspirin 81 MG tablet Take 1 tablet by mouth Daily. 90 tablet 3   • furosemide (LASIX) 20 MG tablet Take 1 tablet by mouth Daily. 90 tablet 3   • nitroglycerin (NITROSTAT) 0.4 MG SL tablet Place 1 tablet under the tongue Every 5 (Five) Minutes As Needed for Chest Pain. Take no more than 3 doses in 15 minutes. 25 tablet 6   • [DISCONTINUED] amLODIPine (NORVASC) 5 MG tablet Take 1 tablet by mouth Daily. 90 tablet 3   • [DISCONTINUED] Coenzyme Q10 (Co Q-10) 100 MG capsule Take 100 mg by mouth 2 (two) times a day. 180 capsule 3   • [DISCONTINUED] hydroCHLOROthiazide (MICROZIDE) 12.5 MG capsule Take 1 capsule by mouth Daily As Needed (edema). 30 capsule 11   • [DISCONTINUED] lisinopril (PRINIVIL,ZESTRIL) 40 MG tablet Take 1 tablet by mouth Daily. 90 tablet 3   • [DISCONTINUED] rosuvastatin (CRESTOR) 20 MG tablet TAKE 1 TABLET BY MOUTH ONCE DAILY 90 tablet 3     No current facility-administered medications on file prior to visit.       ALLERGIES    Patient has no known allergies.    Past Medical History:   Diagnosis Date   • COPD (chronic obstructive pulmonary disease) (HCC)    • Coronary artery disease    • COVID-19 vaccine administered 1st- 04/2021 2nd- 05/2021 - Moderna ( Booster 10/2021) Moderna    • Hyperlipidemia    • Hypertension        Social History     Socioeconomic History   • Marital status:    Tobacco Use   • Smoking status: Current Every Day Smoker     Packs/day: 1.50     Years: 50.00     Pack years: 75.00     Types: Cigarettes   • Smokeless tobacco: Never Used   Substance and Sexual Activity   • Alcohol use: No   • Drug use: No   • Sexual activity: Defer       Family History   Problem Relation Age of Onset   • Hypertension Mother    • Heart disease Mother    • Diabetes Mother    • Heart attack Father        Review of Systems   Constitutional: Negative for appetite change, chills, diaphoresis, fatigue and fever.   HENT: Negative for congestion, rhinorrhea and sore throat.   "  Eyes: Positive for visual disturbance (readin glasses ).   Respiratory: Positive for cough (due to smoking ) and shortness of breath (walking at any distance; pulmonary put her on O2 @ HS did not tolerate it ). Negative for chest tightness and wheezing.    Cardiovascular: Positive for leg swelling (legs, feet and ankles ( left leg is worse she feels a lump in her leg at night when she is laying down and rubs her leg ) swelling goes down some at night; sore spot back of her left leg). Negative for chest pain and palpitations.   Gastrointestinal: Negative for abdominal pain, blood in stool, constipation, diarrhea, nausea and vomiting.   Endocrine: Negative for cold intolerance and heat intolerance.   Genitourinary: Positive for frequency (several times in the day and 1 time in the night she drinks coffee , tea  and takes he water pills ). Negative for difficulty urinating, dysuria, hematuria and urgency.   Musculoskeletal: Positive for arthralgias (through out the body ), back pain (lower ) and joint swelling (legs ). Negative for neck pain and neck stiffness.   Skin: Negative for color change, pallor, rash and wound.   Allergic/Immunologic: Negative for environmental allergies and food allergies.   Neurological: Positive for dizziness (when she lays back and raises up or bends over and rasies back up ), weakness (left elbow due to accident ( she states that she is not has strong has she use to be ) ) and numbness (arms and hands depends on how she sleeps ). Negative for light-headedness and headaches.   Hematological: Bruises/bleeds easily (Bruises and bleeds ).   Psychiatric/Behavioral: Positive for sleep disturbance (pt had cpac but does not use it ).       Objective   /80 (BP Location: Left arm)   Pulse 72   Temp 97.2 °F (36.2 °C)   Ht 162.6 cm (64\")   Wt 81.6 kg (180 lb)   SpO2 95%   BMI 30.90 kg/m²   Vitals:    01/14/22 0826   BP: 146/80   BP Location: Left arm   Pulse: 72   Temp: 97.2 °F (36.2 °C) " "  SpO2: 95%   Weight: 81.6 kg (180 lb)   Height: 162.6 cm (64\")      Lab Results (most recent)     None        Physical Exam  Vitals reviewed.   Constitutional:       General: She is awake.      Appearance: Normal appearance. She is well-developed and well-groomed. She is obese.   HENT:      Head: Normocephalic.   Eyes:      General: Lids are normal.   Neck:      Vascular: No carotid bruit, hepatojugular reflux or JVD.   Cardiovascular:      Rate and Rhythm: Normal rate and regular rhythm.      Pulses:           Radial pulses are 2+ on the right side and 2+ on the left side.        Dorsalis pedis pulses are 2+ on the right side and 2+ on the left side.        Posterior tibial pulses are 2+ on the right side and 2+ on the left side.      Heart sounds: Normal heart sounds.   Pulmonary:      Effort: Pulmonary effort is normal.      Breath sounds: Normal air entry. Examination of the right-middle field reveals decreased breath sounds. Examination of the left-middle field reveals decreased breath sounds. Examination of the right-lower field reveals decreased breath sounds. Examination of the left-lower field reveals decreased breath sounds. Decreased breath sounds present.   Abdominal:      General: Bowel sounds are normal.      Palpations: Abdomen is soft.   Musculoskeletal:      Right lower leg: No edema.      Left lower leg: No edema.   Skin:     General: Skin is warm and dry.   Neurological:      Mental Status: She is alert and oriented to person, place, and time.   Psychiatric:         Attention and Perception: Attention and perception normal.         Mood and Affect: Mood and affect normal.         Speech: Speech normal.         Behavior: Behavior normal. Behavior is cooperative.         Thought Content: Thought content normal.         Cognition and Memory: Cognition and memory normal.         Judgment: Judgment normal.         Procedure     ECG 12 Lead    Date/Time: 1/14/2022 8:42 AM  Performed by: Radha Carranza " SHEILA MAYA  Authorized by: Radha Carranza APRN   Comparison: compared with previous ECG from 1/8/2021  Similar to previous ECG  Rhythm: sinus rhythm  Rate: normal  BPM: 69  Conduction: right bundle branch block  QRS axis: normal    Clinical impression: abnormal EKG                 Assessment/Plan      Diagnosis Plan   1. Coronary artery disease involving native coronary artery of native heart with angina pectoris (HCC)  amLODIPine (NORVASC) 5 MG tablet    rosuvastatin (CRESTOR) 20 MG tablet    ECG 12 Lead    Lipid Panel    Adult Transthoracic Echo Complete W/ Cont if Necessary Per Protocol   2. Essential hypertension  amLODIPine (NORVASC) 5 MG tablet    lisinopril (PRINIVIL,ZESTRIL) 40 MG tablet    ECG 12 Lead    Comprehensive Metabolic Panel    TSH    T3, Free    T4, Free    CBC & Differential    Adult Transthoracic Echo Complete W/ Cont if Necessary Per Protocol   3. Grade I diastolic dysfunction  hydroCHLOROthiazide (MICROZIDE) 12.5 MG capsule    Adult Transthoracic Echo Complete W/ Cont if Necessary Per Protocol    BNP   4. Mixed hyperlipidemia  amLODIPine (NORVASC) 5 MG tablet    Coenzyme Q10 (Co Q-10) 100 MG capsule    rosuvastatin (CRESTOR) 20 MG tablet    Lipid Panel   5. Shortness of breath  amLODIPine (NORVASC) 5 MG tablet    rosuvastatin (CRESTOR) 20 MG tablet    Adult Transthoracic Echo Complete W/ Cont if Necessary Per Protocol    BNP   6. Peripheral edema  hydroCHLOROthiazide (MICROZIDE) 12.5 MG capsule   7. Smoking     8. Dizziness  Duplex Carotid Ultrasound CAR       Return in about 12 weeks (around 4/8/2022).    CAD-patient's on aspirin and statin.  Hypertension-patient is on amlodipine and lisinopril.  Uses her hydrochlorothiazide as needed only.  She just took blood pressure medication prior to appointment.  Diastolic dysfunction/peripheral edema-patient is on Lasix.  Hyperlipidemia-patient is on Crestor.  Shortness of breath-stable.  Smoking-encourage cessation.  Dizziness-patient have carotid  artery ultrasound.  CAD/hypertension/diastolic dysfunction/shortness of breath-patient have a repeat echocardiogram.  She will continue her medication regimen.  She will follow-up in 12 weeks or sooner if any changes or abnormalities with testing.  She is going to come next week and get labs she will get a lipid, CMP, CBC, TSH, free T3, free T4 and lipids.       Cassy Olvera  reports that she has been smoking cigarettes. She has a 75.00 pack-year smoking history. She has never used smokeless tobacco..Advance Care Planning   ACP discussion was declined by the patient. Patient does not have an advance directive, declines further assistance. Patient brought in medicine list to appointment, it's been reviewed with patient and med list was updated in the chart.     Electronically signed by:

## 2022-01-14 NOTE — PATIENT INSTRUCTIONS
Advance Care Planning and Advance Directives     You make decisions on a daily basis - decisions about where you want to live, your career, your home, your life. Perhaps one of the most important decisions you face is your choice for future medical care. Take time to talk with your family and your healthcare team and start planning today.  Advance Care Planning is a process that can help you:  · Understand possible future healthcare decisions in light of your own experiences  · Reflect on those decision in light of your goals and values  · Discuss your decisions with those closest to you and the healthcare professionals that care for you  · Make a plan by creating a document that reflects your wishes    Surrogate Decision Maker  In the event of a medical emergency, which has left you unable to communicate or to make your own decisions, you would need someone to make decisions for you.  It is important to discuss your preferences for medical treatment with this person while you are in good health.     Qualities of a surrogate decision maker:  • Willing to take on this role and responsibility  • Knows what you want for future medical care  • Willing to follow your wishes even if they don't agree with them  • Able to make difficult medical decisions under stressful circumstances    Advance Directives  These are legal documents you can create that will guide your healthcare team and decision maker(s) when needed. These documents can be stored in the electronic medical record.    · Living Will - a legal document to guide your care if you have a terminal condition or a serious illness and are unable to communicate. States vary by statute in document names/types, but most forms may include one or more of the following:        -  Directions regarding life-prolonging treatments        -  Directions regarding artificially provided nutrition/hydration        -  Choosing a healthcare decision maker        -  Direction  regarding organ/tissue donation    · Durable Power of  for Healthcare - this document names an -in-fact to make medical decisions for you, but it may also allow this person to make personal and financial decisions for you. Please seek the advice of an  if you need this type of document.    **Advance Directives are not required and no one may discriminate against you if you do not sign one.    Medical Orders  Many states allow specific forms/orders signed by your physician to record your wishes for medical treatment in your current state of health. This form, signed in personal communication with your physician, addresses resuscitation and other medical interventions that you may or may not want.      For more information or to schedule a time with a [facility] Advance Care Planning Facilitator contact: [email or web address] or call [phone] and someone will contact you directly.

## 2022-01-24 ENCOUNTER — LAB (OUTPATIENT)
Dept: LAB | Facility: HOSPITAL | Age: 77
End: 2022-01-24

## 2022-01-24 DIAGNOSIS — R06.02 SHORTNESS OF BREATH: ICD-10-CM

## 2022-01-24 DIAGNOSIS — I10 ESSENTIAL HYPERTENSION: ICD-10-CM

## 2022-01-24 DIAGNOSIS — I51.89 GRADE I DIASTOLIC DYSFUNCTION: ICD-10-CM

## 2022-01-24 DIAGNOSIS — E78.2 MIXED HYPERLIPIDEMIA: ICD-10-CM

## 2022-01-24 DIAGNOSIS — I65.21 CAROTID ARTERY STENOSIS, SYMPTOMATIC, RIGHT: ICD-10-CM

## 2022-01-24 DIAGNOSIS — I25.119 CORONARY ARTERY DISEASE INVOLVING NATIVE CORONARY ARTERY OF NATIVE HEART WITH ANGINA PECTORIS: ICD-10-CM

## 2022-01-24 LAB
ALBUMIN SERPL-MCNC: 4.87 G/DL (ref 3.5–5.2)
ALBUMIN/GLOB SERPL: 1.7 G/DL
ALP SERPL-CCNC: 84 U/L (ref 39–117)
ALT SERPL W P-5'-P-CCNC: 13 U/L (ref 1–33)
ANION GAP SERPL CALCULATED.3IONS-SCNC: 12.2 MMOL/L (ref 5–15)
AST SERPL-CCNC: 20 U/L (ref 1–32)
BASOPHILS # BLD AUTO: 0.11 10*3/MM3 (ref 0–0.2)
BASOPHILS NFR BLD AUTO: 1 % (ref 0–1.5)
BILIRUB SERPL-MCNC: 0.3 MG/DL (ref 0–1.2)
BUN SERPL-MCNC: 12 MG/DL (ref 8–23)
BUN/CREAT SERPL: 14.6 (ref 7–25)
CALCIUM SPEC-SCNC: 9.6 MG/DL (ref 8.6–10.5)
CHLORIDE SERPL-SCNC: 89 MMOL/L (ref 98–107)
CHOLEST SERPL-MCNC: 131 MG/DL (ref 0–200)
CO2 SERPL-SCNC: 28.8 MMOL/L (ref 22–29)
CREAT SERPL-MCNC: 0.82 MG/DL (ref 0.57–1)
DEPRECATED RDW RBC AUTO: 42 FL (ref 37–54)
EOSINOPHIL # BLD AUTO: 0.08 10*3/MM3 (ref 0–0.4)
EOSINOPHIL NFR BLD AUTO: 0.7 % (ref 0.3–6.2)
ERYTHROCYTE [DISTWIDTH] IN BLOOD BY AUTOMATED COUNT: 11.9 % (ref 12.3–15.4)
GFR SERPL CREATININE-BSD FRML MDRD: 68 ML/MIN/1.73
GLOBULIN UR ELPH-MCNC: 2.8 GM/DL
GLUCOSE SERPL-MCNC: 104 MG/DL (ref 65–99)
HCT VFR BLD AUTO: 49.6 % (ref 34–46.6)
HDLC SERPL-MCNC: 71 MG/DL (ref 40–60)
HGB BLD-MCNC: 16.6 G/DL (ref 12–15.9)
IMM GRANULOCYTES # BLD AUTO: 0.07 10*3/MM3 (ref 0–0.05)
IMM GRANULOCYTES NFR BLD AUTO: 0.6 % (ref 0–0.5)
LDLC SERPL CALC-MCNC: 46 MG/DL (ref 0–100)
LDLC/HDLC SERPL: 0.66 {RATIO}
LYMPHOCYTES # BLD AUTO: 2.52 10*3/MM3 (ref 0.7–3.1)
LYMPHOCYTES NFR BLD AUTO: 23.2 % (ref 19.6–45.3)
MCH RBC QN AUTO: 31.4 PG (ref 26.6–33)
MCHC RBC AUTO-ENTMCNC: 33.5 G/DL (ref 31.5–35.7)
MCV RBC AUTO: 93.9 FL (ref 79–97)
MONOCYTES # BLD AUTO: 0.84 10*3/MM3 (ref 0.1–0.9)
MONOCYTES NFR BLD AUTO: 7.7 % (ref 5–12)
NEUTROPHILS NFR BLD AUTO: 66.8 % (ref 42.7–76)
NEUTROPHILS NFR BLD AUTO: 7.23 10*3/MM3 (ref 1.7–7)
NRBC BLD AUTO-RTO: 0 /100 WBC (ref 0–0.2)
NT-PROBNP SERPL-MCNC: 85.9 PG/ML (ref 0–1800)
PLATELET # BLD AUTO: 397 10*3/MM3 (ref 140–450)
PMV BLD AUTO: 9.4 FL (ref 6–12)
POTASSIUM SERPL-SCNC: 4.8 MMOL/L (ref 3.5–5.2)
PROT SERPL-MCNC: 7.7 G/DL (ref 6–8.5)
RBC # BLD AUTO: 5.28 10*6/MM3 (ref 3.77–5.28)
SODIUM SERPL-SCNC: 130 MMOL/L (ref 136–145)
T4 FREE SERPL-MCNC: 1.21 NG/DL (ref 0.93–1.7)
TRIGL SERPL-MCNC: 66 MG/DL (ref 0–150)
TSH SERPL DL<=0.05 MIU/L-ACNC: 2.26 UIU/ML (ref 0.27–4.2)
VLDLC SERPL-MCNC: 14 MG/DL (ref 5–40)
WBC NRBC COR # BLD: 10.85 10*3/MM3 (ref 3.4–10.8)

## 2022-01-24 PROCEDURE — 84443 ASSAY THYROID STIM HORMONE: CPT

## 2022-01-24 PROCEDURE — 83880 ASSAY OF NATRIURETIC PEPTIDE: CPT

## 2022-01-24 PROCEDURE — 85025 COMPLETE CBC W/AUTO DIFF WBC: CPT

## 2022-01-24 PROCEDURE — 84439 ASSAY OF FREE THYROXINE: CPT

## 2022-01-24 PROCEDURE — 80053 COMPREHEN METABOLIC PANEL: CPT

## 2022-01-24 PROCEDURE — 80061 LIPID PANEL: CPT

## 2022-01-24 PROCEDURE — 84481 FREE ASSAY (FT-3): CPT

## 2022-01-24 PROCEDURE — 70498 CT ANGIOGRAPHY NECK: CPT | Performed by: NURSE PRACTITIONER

## 2022-01-24 PROCEDURE — 36415 COLL VENOUS BLD VENIPUNCTURE: CPT

## 2022-01-25 LAB — T3FREE SERPL-MCNC: 3.14 PG/ML (ref 2–4.4)

## 2022-01-25 NOTE — PROGRESS NOTES
Avsd pt of lab results--Her sodium is still low.  Her cholesterol is excellent.      Her WBCs are high, been on steroids or sick?  She is a smoker sometimes that can be cause of elevated WBC, HCT and HGB. Patient reports that she has recently been sick with a cold.    I avsd her that we forwarded results to her PCP so she can follow up with them.  Patient voiced understanding and is agreeable.

## 2022-02-16 ENCOUNTER — HOSPITAL ENCOUNTER (OUTPATIENT)
Dept: CARDIOLOGY | Facility: HOSPITAL | Age: 77
Discharge: HOME | End: 2022-02-16

## 2022-02-16 DIAGNOSIS — R06.02 SHORTNESS OF BREATH: ICD-10-CM

## 2022-02-16 DIAGNOSIS — I25.119 CORONARY ARTERY DISEASE INVOLVING NATIVE CORONARY ARTERY OF NATIVE HEART WITH ANGINA PECTORIS: ICD-10-CM

## 2022-02-16 DIAGNOSIS — I10 ESSENTIAL HYPERTENSION: ICD-10-CM

## 2022-02-16 DIAGNOSIS — I51.89 GRADE I DIASTOLIC DYSFUNCTION: ICD-10-CM

## 2022-02-16 DIAGNOSIS — R42 DIZZINESS: ICD-10-CM

## 2022-02-16 PROCEDURE — 93880 EXTRACRANIAL BILAT STUDY: CPT | Performed by: INTERNAL MEDICINE

## 2022-02-16 PROCEDURE — 93306 TTE W/DOPPLER COMPLETE: CPT | Performed by: INTERNAL MEDICINE

## 2022-02-16 PROCEDURE — 93880 EXTRACRANIAL BILAT STUDY: CPT

## 2022-02-16 PROCEDURE — 93306 TTE W/DOPPLER COMPLETE: CPT

## 2022-02-27 LAB
BH CV ECHO MEAS - ACS: 1.7 CM
BH CV ECHO MEAS - AO MAX PG: 5.7 MMHG
BH CV ECHO MEAS - AO MEAN PG: 3 MMHG
BH CV ECHO MEAS - AO ROOT AREA (BSA CORRECTED): 1.4
BH CV ECHO MEAS - AO ROOT AREA: 5.7 CM^2
BH CV ECHO MEAS - AO ROOT DIAM: 2.7 CM
BH CV ECHO MEAS - AO V2 MAX: 119 CM/SEC
BH CV ECHO MEAS - AO V2 MEAN: 84.1 CM/SEC
BH CV ECHO MEAS - AO V2 VTI: 29.2 CM
BH CV ECHO MEAS - BSA(HAYCOCK): 1.9 M^2
BH CV ECHO MEAS - BSA(HAYCOCK): 1.9 M^2
BH CV ECHO MEAS - BSA: 1.9 M^2
BH CV ECHO MEAS - BSA: 1.9 M^2
BH CV ECHO MEAS - BZI_BMI: 30.9 KILOGRAMS/M^2
BH CV ECHO MEAS - BZI_BMI: 30.9 KILOGRAMS/M^2
BH CV ECHO MEAS - BZI_METRIC_HEIGHT: 162.6 CM
BH CV ECHO MEAS - BZI_METRIC_HEIGHT: 162.6 CM
BH CV ECHO MEAS - BZI_METRIC_WEIGHT: 81.6 KG
BH CV ECHO MEAS - BZI_METRIC_WEIGHT: 81.6 KG
BH CV ECHO MEAS - EDV(CUBED): 15.1 ML
BH CV ECHO MEAS - EDV(MOD-SP4): 54.9 ML
BH CV ECHO MEAS - EDV(TEICH): 21.7 ML
BH CV ECHO MEAS - EF(CUBED): 92.1 %
BH CV ECHO MEAS - EF(MOD-SP4): 63.2 %
BH CV ECHO MEAS - EF(TEICH): 88.9 %
BH CV ECHO MEAS - EF_3D-VOL: 66 %
BH CV ECHO MEAS - ESV(CUBED): 1.2 ML
BH CV ECHO MEAS - ESV(MOD-SP4): 20.2 ML
BH CV ECHO MEAS - ESV(TEICH): 2.4 ML
BH CV ECHO MEAS - FS: 57.1 %
BH CV ECHO MEAS - IVS/LVPW: 0.92
BH CV ECHO MEAS - IVSD: 1.3 CM
BH CV ECHO MEAS - LA DIMENSION: 3.1 CM
BH CV ECHO MEAS - LA/AO: 1.1
BH CV ECHO MEAS - LV DIASTOLIC VOL/BSA (35-75): 29.4 ML/M^2
BH CV ECHO MEAS - LV IVRT: 0.11 SEC
BH CV ECHO MEAS - LV MASS(C)D: 97.1 GRAMS
BH CV ECHO MEAS - LV MASS(C)DI: 51.9 GRAMS/M^2
BH CV ECHO MEAS - LV SYSTOLIC VOL/BSA (12-30): 10.8 ML/M^2
BH CV ECHO MEAS - LVIDD: 2.5 CM
BH CV ECHO MEAS - LVIDS: 1.1 CM
BH CV ECHO MEAS - LVLD AP4: 6.6 CM
BH CV ECHO MEAS - LVLS AP4: 5.4 CM
BH CV ECHO MEAS - LVOT AREA (M): 2.8 CM^2
BH CV ECHO MEAS - LVOT AREA: 2.8 CM^2
BH CV ECHO MEAS - LVOT DIAM: 1.9 CM
BH CV ECHO MEAS - LVPWD: 1.4 CM
BH CV ECHO MEAS - MV A MAX VEL: 115 CM/SEC
BH CV ECHO MEAS - MV DEC SLOPE: 442 CM/SEC^2
BH CV ECHO MEAS - MV E MAX VEL: 103 CM/SEC
BH CV ECHO MEAS - MV E/A: 0.9
BH CV ECHO MEAS - RAP SYSTOLE: 10 MMHG
BH CV ECHO MEAS - RVDD: 2.6 CM
BH CV ECHO MEAS - RVSP: 38.1 MMHG
BH CV ECHO MEAS - SI(AO): 89.4 ML/M^2
BH CV ECHO MEAS - SI(CUBED): 7.4 ML/M^2
BH CV ECHO MEAS - SI(MOD-SP4): 18.6 ML/M^2
BH CV ECHO MEAS - SI(TEICH): 10.3 ML/M^2
BH CV ECHO MEAS - SV(AO): 167.2 ML
BH CV ECHO MEAS - SV(CUBED): 13.9 ML
BH CV ECHO MEAS - SV(MOD-SP4): 34.7 ML
BH CV ECHO MEAS - SV(TEICH): 19.3 ML
BH CV ECHO MEAS - TR MAX VEL: 265 CM/SEC
BH CV XLRA MEAS LEFT BULB EDV: 7.9 CM/SEC
BH CV XLRA MEAS LEFT BULB PSV: 47.6 CM/SEC
BH CV XLRA MEAS LEFT CCA RATIO VEL: 58.5 CM/SEC
BH CV XLRA MEAS LEFT DIST CCA EDV: 12.7 CM/SEC
BH CV XLRA MEAS LEFT DIST CCA PSV: 58.9 CM/SEC
BH CV XLRA MEAS LEFT DIST ICA EDV: -21.8 CM/SEC
BH CV XLRA MEAS LEFT DIST ICA PSV: -94.3 CM/SEC
BH CV XLRA MEAS LEFT ICA RATIO VEL: -152 CM/SEC
BH CV XLRA MEAS LEFT ICA/CCA RATIO: -2.6
BH CV XLRA MEAS LEFT MID ICA EDV: -25.3 CM/SEC
BH CV XLRA MEAS LEFT MID ICA PSV: -96 CM/SEC
BH CV XLRA MEAS LEFT PROX CCA EDV: 18.2 CM/SEC
BH CV XLRA MEAS LEFT PROX CCA PSV: 79.8 CM/SEC
BH CV XLRA MEAS LEFT PROX ECA EDV: -15.7 CM/SEC
BH CV XLRA MEAS LEFT PROX ECA PSV: -179.9 CM/SEC
BH CV XLRA MEAS LEFT PROX ICA EDV: -37.5 CM/SEC
BH CV XLRA MEAS LEFT PROX ICA PSV: -152.8 CM/SEC
BH CV XLRA MEAS LEFT VERTEBRAL A EDV: 10.9 CM/SEC
BH CV XLRA MEAS LEFT VERTEBRAL A PSV: 38.9 CM/SEC
BH CV XLRA MEAS RIGHT BULB EDV: 10.7 CM/SEC
BH CV XLRA MEAS RIGHT BULB PSV: 97.4 CM/SEC
BH CV XLRA MEAS RIGHT CCA RATIO VEL: 69.8 CM/SEC
BH CV XLRA MEAS RIGHT DIST CCA EDV: 7.5 CM/SEC
BH CV XLRA MEAS RIGHT DIST CCA PSV: 70.4 CM/SEC
BH CV XLRA MEAS RIGHT DIST ICA EDV: -32.1 CM/SEC
BH CV XLRA MEAS RIGHT DIST ICA PSV: -132.6 CM/SEC
BH CV XLRA MEAS RIGHT ICA RATIO VEL: -237 CM/SEC
BH CV XLRA MEAS RIGHT ICA/CCA RATIO: -3.4
BH CV XLRA MEAS RIGHT MID ICA EDV: -22 CM/SEC
BH CV XLRA MEAS RIGHT MID ICA PSV: -86.1 CM/SEC
BH CV XLRA MEAS RIGHT PROX CCA EDV: 14.1 CM/SEC
BH CV XLRA MEAS RIGHT PROX CCA PSV: 86.4 CM/SEC
BH CV XLRA MEAS RIGHT PROX ECA EDV: -24.6 CM/SEC
BH CV XLRA MEAS RIGHT PROX ECA PSV: -147.3 CM/SEC
BH CV XLRA MEAS RIGHT PROX ICA EDV: -62.9 CM/SEC
BH CV XLRA MEAS RIGHT PROX ICA PSV: -238 CM/SEC
BH CV XLRA MEAS RIGHT VERTEBRAL A EDV: 17.6 CM/SEC
BH CV XLRA MEAS RIGHT VERTEBRAL A PSV: 72.3 CM/SEC
MAXIMAL PREDICTED HEART RATE: 144 BPM
MAXIMAL PREDICTED HEART RATE: 144 BPM
STRESS TARGET HR: 122 BPM
STRESS TARGET HR: 122 BPM

## 2022-02-28 ENCOUNTER — TELEPHONE (OUTPATIENT)
Dept: CARDIOLOGY | Facility: CLINIC | Age: 77
End: 2022-02-28

## 2022-02-28 DIAGNOSIS — I65.21 CAROTID ARTERY STENOSIS, SYMPTOMATIC, RIGHT: Primary | ICD-10-CM

## 2022-02-28 DIAGNOSIS — I25.119 CORONARY ARTERY DISEASE INVOLVING NATIVE CORONARY ARTERY OF NATIVE HEART WITH ANGINA PECTORIS: ICD-10-CM

## 2022-02-28 NOTE — TELEPHONE ENCOUNTER
Duplex Carotid:  1.  Both common carotid arteries demonstrate diffuse nonobstructive plaque.     2.  Moderate bifurcation disease bilaterally with densely fibrotic and calcific plaque limiting luminal assessment by echo.  Dopplers compatible with 16 to 49% stenosis.     3.  There is somewhat segmental eccentric stenosis in the proximal right internal carotid artery with Doppler compatible with 50 to 69% stenosis.  Echo suggests a similar degree of narrowing.  16 to 49% stenosis by Doppler throughout the proximal portion of the left internal carotid artery.     4.  Antegrade flow in both vertebral arteries.     Summary: Possibly hemodynamically significant stenosis in the proximal right internal carotid artery.  Otherwise diffuse, moderate, but nonhemodynamically critical disease bilaterally.  Antegrade flow in both vertebral arteries.  Consider CTA for further evaluation.        Echo:  Result Text  Technically limited study.  This is likely due to lung disease subcostal images are of good quality.     1.  LV size and global LV systolic function are preserved.  Visually estimated ejection fraction is 60±5%.  Mild concentric left ventricular hypertrophy with grade 1 diastolic dysfunction.  Although formal regional wall motion assessment cannot be performed, no large dense defects are identified.  The atria and right ventricle are normal.  No septal defect or intracavitary mass or thrombus.     2.  Valves are morphologically normal with no stenotic lesions or valve associated masses or thrombi.  There is trivial MR and trivial to mild TR.     3.  No pericardial or great vessel pathology.     4.  Pulmonary artery systolic pressures are estimated in the high thirties.

## 2022-02-28 NOTE — TELEPHONE ENCOUNTER
----- Message from SHEILA James sent at 2/27/2022  8:36 PM EST -----  Patient is on ASA and statin. Needs CTA of carotids.  Will need BMP.

## 2022-05-02 ENCOUNTER — TELEPHONE (OUTPATIENT)
Dept: CARDIOLOGY | Facility: CLINIC | Age: 77
End: 2022-05-02

## 2022-05-02 DIAGNOSIS — I65.22 CAROTID STENOSIS, ASYMPTOMATIC, LEFT: ICD-10-CM

## 2022-05-02 DIAGNOSIS — I25.119 CORONARY ARTERY DISEASE INVOLVING NATIVE CORONARY ARTERY OF NATIVE HEART WITH ANGINA PECTORIS: Primary | ICD-10-CM

## 2022-05-02 DIAGNOSIS — R93.89 ABNORMAL CT SCAN, NECK: ICD-10-CM

## 2022-05-02 DIAGNOSIS — I65.23 BILATERAL CAROTID ARTERY STENOSIS: ICD-10-CM

## 2022-05-02 DIAGNOSIS — I65.21 CAROTID STENOSIS, ASYMPTOMATIC, RIGHT: ICD-10-CM

## 2022-05-02 NOTE — TELEPHONE ENCOUNTER
----- Message from SHEILA James sent at 5/2/2022 11:28 AM EDT -----  Patient on aspirin and statin.  She needs to be seen for her carotids.  She can either go to Dr. Chisholm/Lala or Dr. Finn.  Make sure she gets CT on CD to take to appt. Thanks!      Left pt a mess to return call regarding the above mess if pt is willing to see someone , results scanned into chart . Waiting to see if pt is willing to see someone for Right Carotid stenosis estimated at 90 % and Left Carotid Artery Proximal stenosis 60-70 % . Pending referral to see if pt will go and to whom she wants to see    GINA Jean-Baptiste

## 2022-05-03 NOTE — TELEPHONE ENCOUNTER
PT called back and left mess that she would like to stay local for referral ( Carotid Stenosis ) Dr Reeves . Referral sent     GINA Jean-Baptiste

## 2022-05-03 NOTE — TELEPHONE ENCOUNTER
Patient called back again and spoke with Jasmyne.  She wants to go to Dr. Finn instead.  Cancelled referral to neurosurgery and placed referral for Dr. Finn.  She was informed to get CTA of neck on CD and take to appt with her .

## 2022-05-09 ENCOUNTER — OFFICE VISIT (OUTPATIENT)
Dept: CARDIAC SURGERY | Facility: CLINIC | Age: 77
End: 2022-05-09

## 2022-05-09 VITALS
TEMPERATURE: 98 F | BODY MASS INDEX: 29.37 KG/M2 | SYSTOLIC BLOOD PRESSURE: 110 MMHG | HEART RATE: 90 BPM | HEIGHT: 64 IN | OXYGEN SATURATION: 98 % | DIASTOLIC BLOOD PRESSURE: 60 MMHG | WEIGHT: 172 LBS

## 2022-05-09 DIAGNOSIS — I65.23 CAROTID STENOSIS, ASYMPTOMATIC, BILATERAL: Primary | ICD-10-CM

## 2022-05-09 PROCEDURE — 99205 OFFICE O/P NEW HI 60 MIN: CPT | Performed by: THORACIC SURGERY (CARDIOTHORACIC VASCULAR SURGERY)

## 2022-05-09 NOTE — PROGRESS NOTES
05/09/2022  Patient Information  Cassy A May                                                                                          [address] [place] [address]   1945  'PCP/Referring Physician'  Baltazar Joseph MD  [phone]  Radha Carranza, APRN  [phone]  Chief Complaint   Patient presents with   • Consult     NP per Dr. Byrne for Carotid Artery Disease.        History of Present Illness:  The patient is a 76-year-old female who was referred to me to evaluate bilateral carotid artery stenosis.  She denies stroke, seizures, TIAs, amaurosis fugax, dysarthria or dysphagia.  She does have a long heavy smoking history and has occasional dizziness.  She had a carotid duplex scan demonstrating bilateral carotid disease and a subsequent CT angiogram demonstrating 90% right internal carotid artery stenosis and over 70% left-sided stenosis.  She is asymptomatic in my office and has been referred for further evaluation.      Patient Active Problem List   Diagnosis   • Coronary artery disease involving native coronary artery of native heart with angina pectoris (HCC)   • Precordial pain   • Shortness of breath   • Mixed hyperlipidemia   • Essential hypertension   • Abnormal stress test   • Smoking   • Peripheral edema   • Elbow fracture, left   • Grade I diastolic dysfunction   • Healthcare maintenance   • Asymptomatic PVD (peripheral vascular disease) ([facility])   • Dizziness   • COPD (chronic obstructive pulmonary disease) (HCC)   • Carotid stenosis, asymptomatic, bilateral     Past Medical History:   Diagnosis Date   • Carotid artery disease (HCC)    • COPD (chronic obstructive pulmonary disease) (HCC)    • Coronary artery disease    • COVID-19 vaccine administered 1st- 04/2021 2nd- 05/2021 - Moderna ( Booster 11/2021) Moderna    • Hyperlipidemia    • Hypertension      Past Surgical History:   Procedure Laterality Date   • CARDIAC CATHETERIZATION     • CARDIAC CATHETERIZATION     •  COLONOSCOPY W/ BIOPSIES     • CORONARY ANGIOPLASTY WITH STENT PLACEMENT     • CORONARY STENT PLACEMENT  01/20/2014    Stent x1 01/20/14   • CORONARY STENT PLACEMENT  2014    Failed PCI to RCA   • OTHER SURGICAL HISTORY Left     Surgery on elbow after it was broken       Current Outpatient Medications:   •  amLODIPine (NORVASC) 5 MG tablet, Take 1 tablet by mouth Daily., Disp: 90 tablet, Rfl: 3  •  aspirin 81 MG tablet, Take 1 tablet by mouth Daily., Disp: 90 tablet, Rfl: 3  •  Coenzyme Q10 (Co Q-10) 100 MG capsule, Take 100 mg by mouth 2 (Two) Times a Day., Disp: 180 capsule, Rfl: 3  •  furosemide (LASIX) 20 MG tablet, Take 1 tablet by mouth Daily., Disp: 90 tablet, Rfl: 3  •  hydroCHLOROthiazide (MICROZIDE) 12.5 MG capsule, Take 1 capsule by mouth Daily As Needed (edema)., Disp: 30 capsule, Rfl: 11  •  lisinopril (PRINIVIL,ZESTRIL) 40 MG tablet, Take 1 tablet by mouth Daily., Disp: 90 tablet, Rfl: 3  •  nitroglycerin (NITROSTAT) 0.4 MG SL tablet, Place 1 tablet under the tongue Every 5 (Five) Minutes As Needed for Chest Pain. Take no more than 3 doses in 15 minutes., Disp: 25 tablet, Rfl: 6  •  rosuvastatin (CRESTOR) 20 MG tablet, Take 1 tablet by mouth Daily., Disp: 90 tablet, Rfl: 3  No Known Allergies  Social History     Socioeconomic History   • Marital status:    • Number of children: 2   Tobacco Use   • Smoking status: Current Every Day Smoker     Packs/day: 1.50     Years: 50.00     Pack years: 75.00     Types: Cigarettes   • Smokeless tobacco: Never Used   Substance and Sexual Activity   • Alcohol use: No   • Drug use: No   • Sexual activity: Defer     Family History   Problem Relation Age of Onset   • Hypertension Mother    • Heart disease Mother    • Diabetes Mother    • Heart attack Father      Review of Systems   Constitutional: Negative for chills, fever, malaise/fatigue, night sweats and weight loss.   HENT: Negative for hearing loss, odynophagia and sore throat.    Cardiovascular: Positive for  "chest pain, dyspnea on exertion and leg swelling. Negative for orthopnea and palpitations.   Respiratory: Negative for cough and shortness of breath.    Hematologic/Lymphatic: Does not bruise/bleed easily.   Skin: Negative for itching and rash.   Musculoskeletal: Negative for arthritis, joint pain, joint swelling and myalgias.   Gastrointestinal: Negative for abdominal pain, constipation, diarrhea, hematemesis, hematochezia, nausea and vomiting.   Genitourinary: Negative for dysuria, frequency and hematuria.   Neurological: Positive for dizziness ( occasional when standing up too fast ). Negative for focal weakness, headaches, numbness and seizures.   Psychiatric/Behavioral: Negative for suicidal ideas.     Vitals:    05/09/22 0832   BP: 110/60   BP Location: Left arm   Pulse: 90   Temp: 98 °F (36.7 °C)   SpO2: 98%   Weight: 78 kg (172 lb)   Height: 162.6 cm (64\")      Physical Exam   CONSTITUTIONAL: Alert and conversant, Well dressed, Well nourished, No acute distress  EYES: Sclera clean, Anicteric, Pupils equal  ENT: No nasal deviation, Trachea midline  NECK: No neck masses, Supple  LUNGS: No wheezing, Cough, non-congested  HEART: No rubs, No murmurs  GASTROINTESTINAL: Soft, non-distended, No masses, Non tender  to palpation, normal bowel sounds  NEURO: No motor deficits, No sensory deficits, Cranial Nerves 2 through 12 grossly intact  PSYCHIATRIC: Oriented to person, place and time, No memory deficits, Mood appropriate  VASCULAR: No carotid bruits, Femoral pulses palpable and symmetric  MUSKULOSKELETAL: No extremity trauma or extremity asymmetry    The ROS, past medical history, surgical history, family history, social history and vitals were reviewed by myself and corrected as needed.      Labs/Imaging:  I reviewed the CT angiogram images demonstrating bilateral carotid stenosis.    Assessment/Plan:  The patient is a 76-year-old female who has 90% right-sided carotid stenosis and over 70% left-sided carotid " stenosis.  I plan for a right-sided carotid endarterectomy followed by left-sided carotid endarterectomy weeks later.  She has been well informed that surgery has risks of stroke, bleeding, infection, death and no guarantees have been made as to the outcome.  I told her the expected hospital stay is 1 day and I described the location and size of the incision.  Also, I indicated that sometimes the hospital stay can extend on various circumstances.    Patient Active Problem List   Diagnosis   • Coronary artery disease involving native coronary artery of native heart with angina pectoris (HCC)   • Precordial pain   • Shortness of breath   • Mixed hyperlipidemia   • Essential hypertension   • Abnormal stress test   • Smoking   • Peripheral edema   • Elbow fracture, left   • Grade I diastolic dysfunction   • Healthcare maintenance   • Asymptomatic PVD (peripheral vascular disease) ([facility])   • Dizziness   • COPD (chronic obstructive pulmonary disease) ([facility])   • Carotid stenosis, asymptomatic, bilateral       CC: MD Radha Cruz APRN Regina Fugate editing for Shiraz Finn MD      I, Shiraz Finn MD, have read and agree with the editing done by Marietta Alan, .

## 2022-05-09 NOTE — H&P (VIEW-ONLY)
05/09/2022  Patient Information  Cassy A May                                                                                          [address] [place] [address]   1945  'PCP/Referring Physician'  Baltazar Joseph MD  [phone]  Radha Carranza, APRN  [phone]  Chief Complaint   Patient presents with   • Consult     NP per Dr. Byrne for Carotid Artery Disease.        History of Present Illness:  The patient is a 76-year-old female who was referred to me to evaluate bilateral carotid artery stenosis.  She denies stroke, seizures, TIAs, amaurosis fugax, dysarthria or dysphagia.  She does have a long heavy smoking history and has occasional dizziness.  She had a carotid duplex scan demonstrating bilateral carotid disease and a subsequent CT angiogram demonstrating 90% right internal carotid artery stenosis and over 70% left-sided stenosis.  She is asymptomatic in my office and has been referred for further evaluation.      Patient Active Problem List   Diagnosis   • Coronary artery disease involving native coronary artery of native heart with angina pectoris (HCC)   • Precordial pain   • Shortness of breath   • Mixed hyperlipidemia   • Essential hypertension   • Abnormal stress test   • Smoking   • Peripheral edema   • Elbow fracture, left   • Grade I diastolic dysfunction   • Healthcare maintenance   • Asymptomatic PVD (peripheral vascular disease) ([facility])   • Dizziness   • COPD (chronic obstructive pulmonary disease) (HCC)   • Carotid stenosis, asymptomatic, bilateral     Past Medical History:   Diagnosis Date   • Carotid artery disease (HCC)    • COPD (chronic obstructive pulmonary disease) (HCC)    • Coronary artery disease    • COVID-19 vaccine administered 1st- 04/2021 2nd- 05/2021 - Moderna ( Booster 11/2021) Moderna    • Hyperlipidemia    • Hypertension      Past Surgical History:   Procedure Laterality Date   • CARDIAC CATHETERIZATION     • CARDIAC CATHETERIZATION     •  COLONOSCOPY W/ BIOPSIES     • CORONARY ANGIOPLASTY WITH STENT PLACEMENT     • CORONARY STENT PLACEMENT  01/20/2014    Stent x1 01/20/14   • CORONARY STENT PLACEMENT  2014    Failed PCI to RCA   • OTHER SURGICAL HISTORY Left     Surgery on elbow after it was broken       Current Outpatient Medications:   •  amLODIPine (NORVASC) 5 MG tablet, Take 1 tablet by mouth Daily., Disp: 90 tablet, Rfl: 3  •  aspirin 81 MG tablet, Take 1 tablet by mouth Daily., Disp: 90 tablet, Rfl: 3  •  Coenzyme Q10 (Co Q-10) 100 MG capsule, Take 100 mg by mouth 2 (Two) Times a Day., Disp: 180 capsule, Rfl: 3  •  furosemide (LASIX) 20 MG tablet, Take 1 tablet by mouth Daily., Disp: 90 tablet, Rfl: 3  •  hydroCHLOROthiazide (MICROZIDE) 12.5 MG capsule, Take 1 capsule by mouth Daily As Needed (edema)., Disp: 30 capsule, Rfl: 11  •  lisinopril (PRINIVIL,ZESTRIL) 40 MG tablet, Take 1 tablet by mouth Daily., Disp: 90 tablet, Rfl: 3  •  nitroglycerin (NITROSTAT) 0.4 MG SL tablet, Place 1 tablet under the tongue Every 5 (Five) Minutes As Needed for Chest Pain. Take no more than 3 doses in 15 minutes., Disp: 25 tablet, Rfl: 6  •  rosuvastatin (CRESTOR) 20 MG tablet, Take 1 tablet by mouth Daily., Disp: 90 tablet, Rfl: 3  No Known Allergies  Social History     Socioeconomic History   • Marital status:    • Number of children: 2   Tobacco Use   • Smoking status: Current Every Day Smoker     Packs/day: 1.50     Years: 50.00     Pack years: 75.00     Types: Cigarettes   • Smokeless tobacco: Never Used   Substance and Sexual Activity   • Alcohol use: No   • Drug use: No   • Sexual activity: Defer     Family History   Problem Relation Age of Onset   • Hypertension Mother    • Heart disease Mother    • Diabetes Mother    • Heart attack Father      Review of Systems   Constitutional: Negative for chills, fever, malaise/fatigue, night sweats and weight loss.   HENT: Negative for hearing loss, odynophagia and sore throat.    Cardiovascular: Positive for  "chest pain, dyspnea on exertion and leg swelling. Negative for orthopnea and palpitations.   Respiratory: Negative for cough and shortness of breath.    Hematologic/Lymphatic: Does not bruise/bleed easily.   Skin: Negative for itching and rash.   Musculoskeletal: Negative for arthritis, joint pain, joint swelling and myalgias.   Gastrointestinal: Negative for abdominal pain, constipation, diarrhea, hematemesis, hematochezia, nausea and vomiting.   Genitourinary: Negative for dysuria, frequency and hematuria.   Neurological: Positive for dizziness ( occasional when standing up too fast ). Negative for focal weakness, headaches, numbness and seizures.   Psychiatric/Behavioral: Negative for suicidal ideas.     Vitals:    05/09/22 0832   BP: 110/60   BP Location: Left arm   Pulse: 90   Temp: 98 °F (36.7 °C)   SpO2: 98%   Weight: 78 kg (172 lb)   Height: 162.6 cm (64\")      Physical Exam   CONSTITUTIONAL: Alert and conversant, Well dressed, Well nourished, No acute distress  EYES: Sclera clean, Anicteric, Pupils equal  ENT: No nasal deviation, Trachea midline  NECK: No neck masses, Supple  LUNGS: No wheezing, Cough, non-congested  HEART: No rubs, No murmurs  GASTROINTESTINAL: Soft, non-distended, No masses, Non tender  to palpation, normal bowel sounds  NEURO: No motor deficits, No sensory deficits, Cranial Nerves 2 through 12 grossly intact  PSYCHIATRIC: Oriented to person, place and time, No memory deficits, Mood appropriate  VASCULAR: No carotid bruits, Femoral pulses palpable and symmetric  MUSKULOSKELETAL: No extremity trauma or extremity asymmetry    The ROS, past medical history, surgical history, family history, social history and vitals were reviewed by myself and corrected as needed.      Labs/Imaging:  I reviewed the CT angiogram images demonstrating bilateral carotid stenosis.    Assessment/Plan:  The patient is a 76-year-old female who has 90% right-sided carotid stenosis and over 70% left-sided carotid " stenosis.  I plan for a right-sided carotid endarterectomy followed by left-sided carotid endarterectomy weeks later.  She has been well informed that surgery has risks of stroke, bleeding, infection, death and no guarantees have been made as to the outcome.  I told her the expected hospital stay is 1 day and I described the location and size of the incision.  Also, I indicated that sometimes the hospital stay can extend on various circumstances.    Patient Active Problem List   Diagnosis   • Coronary artery disease involving native coronary artery of native heart with angina pectoris (HCC)   • Precordial pain   • Shortness of breath   • Mixed hyperlipidemia   • Essential hypertension   • Abnormal stress test   • Smoking   • Peripheral edema   • Elbow fracture, left   • Grade I diastolic dysfunction   • Healthcare maintenance   • Asymptomatic PVD (peripheral vascular disease) ([facility])   • Dizziness   • COPD (chronic obstructive pulmonary disease) ([facility])   • Carotid stenosis, asymptomatic, bilateral       CC: MD Radha Cruz APRN Regina Fugate editing for Shiraz Finn MD      I, Shiraz Finn MD, have read and agree with the editing done by Marietta Alan, .

## 2022-05-10 DIAGNOSIS — I65.23 CAROTID STENOSIS, ASYMPTOMATIC, BILATERAL: Primary | ICD-10-CM

## 2022-05-11 ENCOUNTER — PREP FOR SURGERY (OUTPATIENT)
Dept: OTHER | Facility: HOSPITAL | Age: 77
End: 2022-05-11

## 2022-05-11 DIAGNOSIS — I65.21 STENOSIS OF RIGHT CAROTID ARTERY: Primary | ICD-10-CM

## 2022-05-11 RX ORDER — CHLORHEXIDINE GLUCONATE 500 MG/1
1 CLOTH TOPICAL EVERY 12 HOURS PRN
Status: CANCELLED | OUTPATIENT
Start: 2022-05-11

## 2022-05-16 ENCOUNTER — PRE-ADMISSION TESTING (OUTPATIENT)
Dept: PREADMISSION TESTING | Facility: HOSPITAL | Age: 77
End: 2022-05-16

## 2022-05-16 ENCOUNTER — HOSPITAL ENCOUNTER (OUTPATIENT)
Dept: GENERAL RADIOLOGY | Facility: HOSPITAL | Age: 77
Discharge: HOME | End: 2022-05-16

## 2022-05-16 ENCOUNTER — ANESTHESIA EVENT (OUTPATIENT)
Dept: PERIOP | Facility: HOSPITAL | Age: 77
End: 2022-05-16

## 2022-05-16 DIAGNOSIS — I65.21 STENOSIS OF RIGHT CAROTID ARTERY: ICD-10-CM

## 2022-05-16 LAB
ABO GROUP BLD: NORMAL
AMPHET+METHAMPHET UR QL: NEGATIVE
AMPHETAMINES UR QL: NEGATIVE
ANION GAP SERPL CALCULATED.3IONS-SCNC: 10 MMOL/L (ref 5–15)
BARBITURATES UR QL SCN: NEGATIVE
BENZODIAZ UR QL SCN: NEGATIVE
BLD GP AB SCN SERPL QL: NEGATIVE
BUN SERPL-MCNC: 11 MG/DL (ref 8–23)
BUN/CREAT SERPL: 15.3 (ref 7–25)
BUPRENORPHINE SERPL-MCNC: NEGATIVE NG/ML
CALCIUM SPEC-SCNC: 9.9 MG/DL (ref 8.6–10.5)
CANNABINOIDS SERPL QL: NEGATIVE
CHLORIDE SERPL-SCNC: 94 MMOL/L (ref 98–107)
CO2 SERPL-SCNC: 33 MMOL/L (ref 22–29)
COCAINE UR QL: NEGATIVE
CREAT SERPL-MCNC: 0.72 MG/DL (ref 0.57–1)
DEPRECATED RDW RBC AUTO: 42.2 FL (ref 37–54)
EGFRCR SERPLBLD CKD-EPI 2021: 86.8 ML/MIN/1.73
ERYTHROCYTE [DISTWIDTH] IN BLOOD BY AUTOMATED COUNT: 12.1 % (ref 12.3–15.4)
GLUCOSE SERPL-MCNC: 85 MG/DL (ref 65–99)
HBA1C MFR BLD: 6.2 % (ref 4.8–5.6)
HCT VFR BLD AUTO: 46.6 % (ref 34–46.6)
HGB BLD-MCNC: 15.6 G/DL (ref 12–15.9)
INR PPP: 0.95 (ref 0.84–1.13)
MCH RBC QN AUTO: 31.4 PG (ref 26.6–33)
MCHC RBC AUTO-ENTMCNC: 33.5 G/DL (ref 31.5–35.7)
MCV RBC AUTO: 93.8 FL (ref 79–97)
METHADONE UR QL SCN: NEGATIVE
OPIATES UR QL: NEGATIVE
OXYCODONE UR QL SCN: NEGATIVE
PCP UR QL SCN: NEGATIVE
PLATELET # BLD AUTO: 341 10*3/MM3 (ref 140–450)
PMV BLD AUTO: 9.3 FL (ref 6–12)
POTASSIUM SERPL-SCNC: 4.5 MMOL/L (ref 3.5–5.2)
PROPOXYPH UR QL: NEGATIVE
PROTHROMBIN TIME: 12.6 SECONDS (ref 11.4–14.4)
RBC # BLD AUTO: 4.97 10*6/MM3 (ref 3.77–5.28)
RH BLD: POSITIVE
SARS-COV-2 RNA PNL SPEC NAA+PROBE: NOT DETECTED
SODIUM SERPL-SCNC: 137 MMOL/L (ref 136–145)
T&S EXPIRATION DATE: NORMAL
TRICYCLICS UR QL SCN: NEGATIVE
WBC NRBC COR # BLD: 9.61 10*3/MM3 (ref 3.4–10.8)

## 2022-05-16 PROCEDURE — 86900 BLOOD TYPING SEROLOGIC ABO: CPT

## 2022-05-16 PROCEDURE — 36415 COLL VENOUS BLD VENIPUNCTURE: CPT

## 2022-05-16 PROCEDURE — U0005 INFEC AGEN DETEC AMPLI PROBE: HCPCS

## 2022-05-16 PROCEDURE — 71046 X-RAY EXAM CHEST 2 VIEWS: CPT

## 2022-05-16 PROCEDURE — 80048 BASIC METABOLIC PNL TOTAL CA: CPT

## 2022-05-16 PROCEDURE — 85610 PROTHROMBIN TIME: CPT

## 2022-05-16 PROCEDURE — 86850 RBC ANTIBODY SCREEN: CPT

## 2022-05-16 PROCEDURE — 80306 DRUG TEST PRSMV INSTRMNT: CPT | Performed by: PHYSICIAN ASSISTANT

## 2022-05-16 PROCEDURE — 83036 HEMOGLOBIN GLYCOSYLATED A1C: CPT

## 2022-05-16 PROCEDURE — 80305 DRUG TEST PRSMV DIR OPT OBS: CPT | Performed by: PHYSICIAN ASSISTANT

## 2022-05-16 PROCEDURE — U0004 COV-19 TEST NON-CDC HGH THRU: HCPCS

## 2022-05-16 PROCEDURE — 86901 BLOOD TYPING SEROLOGIC RH(D): CPT

## 2022-05-16 PROCEDURE — C9803 HOPD COVID-19 SPEC COLLECT: HCPCS

## 2022-05-16 PROCEDURE — 85027 COMPLETE CBC AUTOMATED: CPT

## 2022-05-16 RX ORDER — FAMOTIDINE 10 MG/ML
20 INJECTION, SOLUTION INTRAVENOUS ONCE
Status: CANCELLED | OUTPATIENT
Start: 2022-05-16 | End: 2022-05-16

## 2022-05-16 RX ORDER — CHLORHEXIDINE GLUCONATE 500 MG/1
1 CLOTH TOPICAL EVERY 12 HOURS PRN
Status: DISCONTINUED | OUTPATIENT
Start: 2022-05-16 | End: 2022-07-21

## 2022-05-16 RX ORDER — SODIUM CHLORIDE 0.9 % (FLUSH) 0.9 %
10 SYRINGE (ML) INJECTION AS NEEDED
Status: CANCELLED | OUTPATIENT
Start: 2022-05-16

## 2022-05-16 RX ORDER — SODIUM CHLORIDE 0.9 % (FLUSH) 0.9 %
10 SYRINGE (ML) INJECTION EVERY 12 HOURS SCHEDULED
Status: CANCELLED | OUTPATIENT
Start: 2022-05-16

## 2022-05-16 NOTE — PAT
Patient did not review general PAT education video as instructed in their preoperative information received from their surgeon.  One-on-one Pre Admission Testing general education provided during PAT visit.  Copies of [facility] general education handouts (Incentive Spirometry, Meds to Beds Program, Patient Belongings, Pre-op skin preparation instructions, Blood Glucose testing, Visitor policy, Surgery FAQ, Code H) distributed to patient. Encouraged patient/family to read [facility] general education handouts thoroughly and notify PAT staff with any questions or concerns. Patient instructed to bring PAT pass and completed skin prep sheet (if applicable) on the day of procedure. Patient verbalized understanding of all information and priority content.     Blood bank bracelet applied to patient during Pre Admission Testing visit.  Patient instructed not to remove from arm until after procedure and they are discharged from the hospital.  Explained to patient that they may shower and get the bracelet wet, but not to immerse under water for longer periods (bathing, swimming, hand dishwashing, etc).  Patient verbalized understanding.    Per Anesthesia Request, patient instructed not to take their ACE/ARB medications on the AM of surgery.    Patient to apply Chlorhexadine wipes  to surgical area (as instructed) the night before procedure and the AM of procedure. Wipes provided.    Covid test done in PAT.     Patient directed to Radiology Department for CXR after Pre Admission Testing Appointment.

## 2022-05-17 ENCOUNTER — HOSPITAL ENCOUNTER (INPATIENT)
Facility: HOSPITAL | Age: 77
LOS: 1 days | Discharge: HOME | End: 2022-05-18
Attending: THORACIC SURGERY (CARDIOTHORACIC VASCULAR SURGERY) | Admitting: THORACIC SURGERY (CARDIOTHORACIC VASCULAR SURGERY)

## 2022-05-17 ENCOUNTER — APPOINTMENT (OUTPATIENT)
Dept: NEUROLOGY | Facility: HOSPITAL | Age: 77
End: 2022-05-17

## 2022-05-17 ENCOUNTER — ANESTHESIA (OUTPATIENT)
Dept: PERIOP | Facility: HOSPITAL | Age: 77
End: 2022-05-17

## 2022-05-17 ENCOUNTER — ANESTHESIA EVENT CONVERTED (OUTPATIENT)
Dept: ANESTHESIOLOGY | Facility: HOSPITAL | Age: 77
End: 2022-05-17

## 2022-05-17 DIAGNOSIS — I65.21 STENOSIS OF RIGHT CAROTID ARTERY: ICD-10-CM

## 2022-05-17 DIAGNOSIS — I65.23 CAROTID STENOSIS, ASYMPTOMATIC, BILATERAL: ICD-10-CM

## 2022-05-17 PROBLEM — Z72.0 TOBACCO USE: Status: ACTIVE | Noted: 2022-05-17

## 2022-05-17 LAB
ABO GROUP BLD: NORMAL
ARTERIAL PATENCY WRIST A: ABNORMAL
ATMOSPHERIC PRESS: ABNORMAL MM[HG]
BASE EXCESS BLDA CALC-SCNC: 2 MMOL/L (ref 0–2)
BDY SITE: ABNORMAL
BODY TEMPERATURE: 37 C
CO2 BLDA-SCNC: 28.9 MMOL/L (ref 22–33)
COHGB MFR BLD: 0.7 % (ref 0–2)
COTININE UR QL SCN: POSITIVE NG/ML
EPAP: 0
HCO3 BLDA-SCNC: 27.5 MMOL/L (ref 20–26)
HCT VFR BLD CALC: 38.8 % (ref 38–51)
HGB BLDA-MCNC: 12.7 G/DL (ref 14–18)
INHALED O2 CONCENTRATION: 44 %
IPAP: 0
Lab: ABNORMAL
METHGB BLD QL: 0.5 % (ref 0–1.5)
MODALITY: ABNORMAL
NOTE: ABNORMAL
OXYHGB MFR BLDV: 97.3 % (ref 94–99)
PAW @ PEAK INSP FLOW SETTING VENT: 0 CMH2O
PCO2 BLDA: 45.8 MM HG (ref 35–45)
PCO2 TEMP ADJ BLD: 45.8 MM HG (ref 35–45)
PH BLDA: 7.39 PH UNITS (ref 7.35–7.45)
PH, TEMP CORRECTED: 7.39 PH UNITS
PO2 BLDA: 108 MM HG (ref 83–108)
PO2 TEMP ADJ BLD: 108 MM HG (ref 83–108)
RH BLD: POSITIVE
TOTAL RATE: 0 BREATHS/MINUTE

## 2022-05-17 PROCEDURE — C1768 GRAFT, VASCULAR: HCPCS | Performed by: THORACIC SURGERY (CARDIOTHORACIC VASCULAR SURGERY)

## 2022-05-17 PROCEDURE — 25010000002 PROPOFOL 10 MG/ML EMULSION: Performed by: NURSE ANESTHETIST, CERTIFIED REGISTERED

## 2022-05-17 PROCEDURE — 25010000002 PROTAMINE SULFATE PER 10 MG: Performed by: NURSE ANESTHETIST, CERTIFIED REGISTERED

## 2022-05-17 PROCEDURE — 25010000002 FENTANYL CITRATE (PF) 50 MCG/ML SOLUTION: Performed by: NURSE ANESTHETIST, CERTIFIED REGISTERED

## 2022-05-17 PROCEDURE — 94664 DEMO&/EVAL PT USE INHALER: CPT

## 2022-05-17 PROCEDURE — 88304 TISSUE EXAM BY PATHOLOGIST: CPT | Performed by: THORACIC SURGERY (CARDIOTHORACIC VASCULAR SURGERY)

## 2022-05-17 PROCEDURE — 25010000002 DEXAMETHASONE PER 1 MG: Performed by: NURSE ANESTHETIST, CERTIFIED REGISTERED

## 2022-05-17 PROCEDURE — 0 LIDOCAINE 1 % SOLUTION: Performed by: NURSE ANESTHETIST, CERTIFIED REGISTERED

## 2022-05-17 PROCEDURE — 03UK0KZ SUPPLEMENT RIGHT INTERNAL CAROTID ARTERY WITH NONAUTOLOGOUS TISSUE SUBSTITUTE, OPEN APPROACH: ICD-10-PCS | Performed by: THORACIC SURGERY (CARDIOTHORACIC VASCULAR SURGERY)

## 2022-05-17 PROCEDURE — 94640 AIRWAY INHALATION TREATMENT: CPT

## 2022-05-17 PROCEDURE — 25010000002 PHENYLEPHRINE 10 MG/ML SOLUTION 1 ML VIAL: Performed by: NURSE ANESTHETIST, CERTIFIED REGISTERED

## 2022-05-17 PROCEDURE — 25010000002 ONDANSETRON PER 1 MG: Performed by: NURSE ANESTHETIST, CERTIFIED REGISTERED

## 2022-05-17 PROCEDURE — 93010 ELECTROCARDIOGRAM REPORT: CPT | Performed by: INTERNAL MEDICINE

## 2022-05-17 PROCEDURE — P9041 ALBUMIN (HUMAN),5%, 50ML: HCPCS

## 2022-05-17 PROCEDURE — 82375 ASSAY CARBOXYHB QUANT: CPT

## 2022-05-17 PROCEDURE — 25010000002 CEFAZOLIN PER 500 MG: Performed by: PHYSICIAN ASSISTANT

## 2022-05-17 PROCEDURE — 95816 EEG AWAKE AND DROWSY: CPT

## 2022-05-17 PROCEDURE — 4A10X4Z MONITORING OF CENTRAL NERVOUS ELECTRICAL ACTIVITY, EXTERNAL APPROACH: ICD-10-PCS | Performed by: THORACIC SURGERY (CARDIOTHORACIC VASCULAR SURGERY)

## 2022-05-17 PROCEDURE — 88311 DECALCIFY TISSUE: CPT | Performed by: THORACIC SURGERY (CARDIOTHORACIC VASCULAR SURGERY)

## 2022-05-17 PROCEDURE — 94799 UNLISTED PULMONARY SVC/PX: CPT

## 2022-05-17 PROCEDURE — 35301 RECHANNELING OF ARTERY: CPT | Performed by: PHYSICIAN ASSISTANT

## 2022-05-17 PROCEDURE — 82805 BLOOD GASES W/O2 SATURATION: CPT

## 2022-05-17 PROCEDURE — 93005 ELECTROCARDIOGRAM TRACING: CPT | Performed by: INTERNAL MEDICINE

## 2022-05-17 PROCEDURE — 86901 BLOOD TYPING SEROLOGIC RH(D): CPT

## 2022-05-17 PROCEDURE — 0 LIDOCAINE 1 % SOLUTION: Performed by: THORACIC SURGERY (CARDIOTHORACIC VASCULAR SURGERY)

## 2022-05-17 PROCEDURE — 25010000002 CEFAZOLIN PER 500 MG: Performed by: THORACIC SURGERY (CARDIOTHORACIC VASCULAR SURGERY)

## 2022-05-17 PROCEDURE — 86900 BLOOD TYPING SEROLOGIC ABO: CPT

## 2022-05-17 PROCEDURE — 99233 SBSQ HOSP IP/OBS HIGH 50: CPT | Performed by: INTERNAL MEDICINE

## 2022-05-17 PROCEDURE — 25010000002 HEPARIN (PORCINE) PER 1000 UNITS: Performed by: NURSE ANESTHETIST, CERTIFIED REGISTERED

## 2022-05-17 PROCEDURE — 25010000002 FENTANYL CITRATE (PF) 50 MCG/ML SOLUTION

## 2022-05-17 PROCEDURE — 25010000002 ALBUMIN HUMAN 5% PER 50 ML

## 2022-05-17 PROCEDURE — 03CK0ZZ EXTIRPATION OF MATTER FROM RIGHT INTERNAL CAROTID ARTERY, OPEN APPROACH: ICD-10-PCS | Performed by: THORACIC SURGERY (CARDIOTHORACIC VASCULAR SURGERY)

## 2022-05-17 PROCEDURE — 25010000002 CEFAZOLIN IN DEXTROSE 2-4 GM/100ML-% SOLUTION

## 2022-05-17 PROCEDURE — 35301 RECHANNELING OF ARTERY: CPT | Performed by: THORACIC SURGERY (CARDIOTHORACIC VASCULAR SURGERY)

## 2022-05-17 PROCEDURE — 25010000002 GENTAMICIN PER 80 MG: Performed by: THORACIC SURGERY (CARDIOTHORACIC VASCULAR SURGERY)

## 2022-05-17 PROCEDURE — 95955 EEG DURING SURGERY: CPT

## 2022-05-17 PROCEDURE — 83050 HGB METHEMOGLOBIN QUAN: CPT

## 2022-05-17 DEVICE — VASCU-GUARD PERIPHERAL VASCULAR PATCH IS PREPARED FROM BOVINE PERICARDIUM WHICH IS CROSS-LINKED WITH GLUTARALDEHYDE. THE PERICARDIUM IS PROCURED FROM CATTLE ORIGINATING IN THE UNITED STATES. VASCU-GUARD PERIPHERAL VASCULAR PATCH IS CHEMICALLY STERILIZED USING ETHANOL AND PROPYLENE OXIDE. VASCU-GUARD PERIPHERAL VASCULAR PATCH HAS BEEN TREATED WITH 1 MOLAR SODIUM HYDROXIDE FOR A MINIMUM OF 60 MINUTES AT 20 - 25 C.  VASCU-GUARD PERIPHERAL VASCULAR PATCH IS PACKAGED IN A CONTAINER FILLED WITH STERILE, NON-PYROGENIC WATER CONTAINING PROPYLENE OXIDE. THE CONTENTS OF THE UNOPENED, UNDAMAGED CONTAINER ARE STERILE.
Type: IMPLANTABLE DEVICE | Site: CAROTID | Status: FUNCTIONAL
Brand: VASCU-GUARD PERIPHERAL VASCULAR PATCH

## 2022-05-17 RX ORDER — ROCURONIUM BROMIDE 10 MG/ML
INJECTION, SOLUTION INTRAVENOUS AS NEEDED
Status: DISCONTINUED | OUTPATIENT
Start: 2022-05-17 | End: 2022-05-17 | Stop reason: SURG

## 2022-05-17 RX ORDER — ASPIRIN 81 MG/1
81 TABLET ORAL DAILY
Status: DISCONTINUED | OUTPATIENT
Start: 2022-05-17 | End: 2022-05-18 | Stop reason: HOSPADM

## 2022-05-17 RX ORDER — MEPERIDINE HYDROCHLORIDE 25 MG/ML
12.5 INJECTION INTRAMUSCULAR; INTRAVENOUS; SUBCUTANEOUS
Status: DISCONTINUED | OUTPATIENT
Start: 2022-05-17 | End: 2022-05-17 | Stop reason: HOSPADM

## 2022-05-17 RX ORDER — ONDANSETRON 2 MG/ML
4 INJECTION INTRAMUSCULAR; INTRAVENOUS EVERY 6 HOURS PRN
Status: DISCONTINUED | OUTPATIENT
Start: 2022-05-17 | End: 2022-05-18 | Stop reason: HOSPADM

## 2022-05-17 RX ORDER — HEPARIN SODIUM 1000 [USP'U]/ML
INJECTION, SOLUTION INTRAVENOUS; SUBCUTANEOUS AS NEEDED
Status: DISCONTINUED | OUTPATIENT
Start: 2022-05-17 | End: 2022-05-17 | Stop reason: SURG

## 2022-05-17 RX ORDER — AMLODIPINE BESYLATE 5 MG/1
5 TABLET ORAL DAILY
Status: DISCONTINUED | OUTPATIENT
Start: 2022-05-17 | End: 2022-05-18 | Stop reason: HOSPADM

## 2022-05-17 RX ORDER — PROMETHAZINE HYDROCHLORIDE 25 MG/1
25 SUPPOSITORY RECTAL ONCE AS NEEDED
Status: DISCONTINUED | OUTPATIENT
Start: 2022-05-17 | End: 2022-05-17 | Stop reason: HOSPADM

## 2022-05-17 RX ORDER — FENTANYL CITRATE 50 UG/ML
INJECTION, SOLUTION INTRAMUSCULAR; INTRAVENOUS AS NEEDED
Status: DISCONTINUED | OUTPATIENT
Start: 2022-05-17 | End: 2022-05-17 | Stop reason: SURG

## 2022-05-17 RX ORDER — FENTANYL CITRATE 50 UG/ML
50 INJECTION, SOLUTION INTRAMUSCULAR; INTRAVENOUS
Status: DISCONTINUED | OUTPATIENT
Start: 2022-05-17 | End: 2022-05-17 | Stop reason: HOSPADM

## 2022-05-17 RX ORDER — SODIUM CHLORIDE 0.9 % (FLUSH) 0.9 %
3 SYRINGE (ML) INJECTION EVERY 12 HOURS SCHEDULED
Status: DISCONTINUED | OUTPATIENT
Start: 2022-05-17 | End: 2022-05-17 | Stop reason: HOSPADM

## 2022-05-17 RX ORDER — IPRATROPIUM BROMIDE AND ALBUTEROL SULFATE 2.5; .5 MG/3ML; MG/3ML
3 SOLUTION RESPIRATORY (INHALATION) ONCE AS NEEDED
Status: DISCONTINUED | OUTPATIENT
Start: 2022-05-17 | End: 2022-05-17 | Stop reason: HOSPADM

## 2022-05-17 RX ORDER — FAMOTIDINE 20 MG/1
20 TABLET, FILM COATED ORAL ONCE
Status: COMPLETED | OUTPATIENT
Start: 2022-05-17 | End: 2022-05-17

## 2022-05-17 RX ORDER — ALBUMIN, HUMAN INJ 5% 5 %
SOLUTION INTRAVENOUS
Status: COMPLETED
Start: 2022-05-17 | End: 2022-05-17

## 2022-05-17 RX ORDER — SODIUM CHLORIDE 0.9 % (FLUSH) 0.9 %
10 SYRINGE (ML) INJECTION AS NEEDED
Status: DISCONTINUED | OUTPATIENT
Start: 2022-05-17 | End: 2022-05-18 | Stop reason: HOSPADM

## 2022-05-17 RX ORDER — ALBUMIN, HUMAN INJ 5% 5 %
250 SOLUTION INTRAVENOUS ONCE
Status: COMPLETED | OUTPATIENT
Start: 2022-05-17 | End: 2022-05-17

## 2022-05-17 RX ORDER — NICARDIPINE HYDROCHLORIDE 2.5 MG/ML
INJECTION INTRAVENOUS CONTINUOUS PRN
Status: DISCONTINUED | OUTPATIENT
Start: 2022-05-17 | End: 2022-05-17 | Stop reason: SURG

## 2022-05-17 RX ORDER — PROPOFOL 10 MG/ML
VIAL (ML) INTRAVENOUS AS NEEDED
Status: DISCONTINUED | OUTPATIENT
Start: 2022-05-17 | End: 2022-05-17 | Stop reason: SURG

## 2022-05-17 RX ORDER — ONDANSETRON 2 MG/ML
INJECTION INTRAMUSCULAR; INTRAVENOUS AS NEEDED
Status: DISCONTINUED | OUTPATIENT
Start: 2022-05-17 | End: 2022-05-17 | Stop reason: SURG

## 2022-05-17 RX ORDER — LIDOCAINE HYDROCHLORIDE 20 MG/ML
5 SOLUTION OROPHARYNGEAL
Status: DISCONTINUED | OUTPATIENT
Start: 2022-05-17 | End: 2022-05-18 | Stop reason: HOSPADM

## 2022-05-17 RX ORDER — IPRATROPIUM BROMIDE AND ALBUTEROL SULFATE 2.5; .5 MG/3ML; MG/3ML
3 SOLUTION RESPIRATORY (INHALATION)
Status: DISCONTINUED | OUTPATIENT
Start: 2022-05-17 | End: 2022-05-18 | Stop reason: HOSPADM

## 2022-05-17 RX ORDER — HYDROCHLOROTHIAZIDE 12.5 MG/1
12.5 CAPSULE, GELATIN COATED ORAL DAILY PRN
Status: DISCONTINUED | OUTPATIENT
Start: 2022-05-17 | End: 2022-05-17

## 2022-05-17 RX ORDER — LABETALOL HYDROCHLORIDE 5 MG/ML
5 INJECTION, SOLUTION INTRAVENOUS
Status: DISCONTINUED | OUTPATIENT
Start: 2022-05-17 | End: 2022-05-17 | Stop reason: HOSPADM

## 2022-05-17 RX ORDER — IPRATROPIUM BROMIDE AND ALBUTEROL SULFATE 2.5; .5 MG/3ML; MG/3ML
3 SOLUTION RESPIRATORY (INHALATION) EVERY 4 HOURS PRN
Status: DISCONTINUED | OUTPATIENT
Start: 2022-05-17 | End: 2022-05-18 | Stop reason: HOSPADM

## 2022-05-17 RX ORDER — PROTAMINE SULFATE 10 MG/ML
INJECTION, SOLUTION INTRAVENOUS AS NEEDED
Status: DISCONTINUED | OUTPATIENT
Start: 2022-05-17 | End: 2022-05-17 | Stop reason: SURG

## 2022-05-17 RX ORDER — MIDAZOLAM HYDROCHLORIDE 1 MG/ML
0.5 INJECTION INTRAMUSCULAR; INTRAVENOUS
Status: DISCONTINUED | OUTPATIENT
Start: 2022-05-17 | End: 2022-05-17 | Stop reason: HOSPADM

## 2022-05-17 RX ORDER — LIDOCAINE HYDROCHLORIDE 10 MG/ML
INJECTION, SOLUTION INFILTRATION; PERINEURAL AS NEEDED
Status: DISCONTINUED | OUTPATIENT
Start: 2022-05-17 | End: 2022-05-17 | Stop reason: SURG

## 2022-05-17 RX ORDER — LABETALOL HYDROCHLORIDE 5 MG/ML
INJECTION, SOLUTION INTRAVENOUS AS NEEDED
Status: DISCONTINUED | OUTPATIENT
Start: 2022-05-17 | End: 2022-05-17 | Stop reason: SURG

## 2022-05-17 RX ORDER — HYDROCODONE BITARTRATE AND ACETAMINOPHEN 7.5; 325 MG/1; MG/1
1 TABLET ORAL EVERY 4 HOURS PRN
Status: DISCONTINUED | OUTPATIENT
Start: 2022-05-17 | End: 2022-05-18 | Stop reason: HOSPADM

## 2022-05-17 RX ORDER — SODIUM CHLORIDE, SODIUM LACTATE, POTASSIUM CHLORIDE, CALCIUM CHLORIDE 600; 310; 30; 20 MG/100ML; MG/100ML; MG/100ML; MG/100ML
9 INJECTION, SOLUTION INTRAVENOUS CONTINUOUS
Status: DISCONTINUED | OUTPATIENT
Start: 2022-05-17 | End: 2022-05-18

## 2022-05-17 RX ORDER — ONDANSETRON 4 MG/1
4 TABLET, FILM COATED ORAL EVERY 6 HOURS PRN
Status: DISCONTINUED | OUTPATIENT
Start: 2022-05-17 | End: 2022-05-18 | Stop reason: HOSPADM

## 2022-05-17 RX ORDER — SODIUM CHLORIDE 9 MG/ML
INJECTION, SOLUTION INTRAVENOUS AS NEEDED
Status: DISCONTINUED | OUTPATIENT
Start: 2022-05-17 | End: 2022-05-17 | Stop reason: HOSPADM

## 2022-05-17 RX ORDER — NALOXONE HCL 0.4 MG/ML
0.4 VIAL (ML) INJECTION AS NEEDED
Status: DISCONTINUED | OUTPATIENT
Start: 2022-05-17 | End: 2022-05-17 | Stop reason: HOSPADM

## 2022-05-17 RX ORDER — ROSUVASTATIN CALCIUM 20 MG/1
20 TABLET, COATED ORAL DAILY
Status: DISCONTINUED | OUTPATIENT
Start: 2022-05-17 | End: 2022-05-18 | Stop reason: HOSPADM

## 2022-05-17 RX ORDER — LIDOCAINE HYDROCHLORIDE 10 MG/ML
0.5 INJECTION, SOLUTION EPIDURAL; INFILTRATION; INTRACAUDAL; PERINEURAL ONCE AS NEEDED
Status: COMPLETED | OUTPATIENT
Start: 2022-05-17 | End: 2022-05-17

## 2022-05-17 RX ORDER — LIDOCAINE HYDROCHLORIDE 10 MG/ML
INJECTION, SOLUTION INFILTRATION; PERINEURAL AS NEEDED
Status: DISCONTINUED | OUTPATIENT
Start: 2022-05-17 | End: 2022-05-17 | Stop reason: HOSPADM

## 2022-05-17 RX ORDER — HYDROCODONE BITARTRATE AND ACETAMINOPHEN 5; 325 MG/1; MG/1
1 TABLET ORAL ONCE AS NEEDED
Status: DISCONTINUED | OUTPATIENT
Start: 2022-05-17 | End: 2022-05-17 | Stop reason: HOSPADM

## 2022-05-17 RX ORDER — IPRATROPIUM BROMIDE AND ALBUTEROL SULFATE 2.5; .5 MG/3ML; MG/3ML
3 SOLUTION RESPIRATORY (INHALATION) ONCE
Status: COMPLETED | OUTPATIENT
Start: 2022-05-17 | End: 2022-05-17

## 2022-05-17 RX ORDER — DEXAMETHASONE SODIUM PHOSPHATE 4 MG/ML
INJECTION, SOLUTION INTRA-ARTICULAR; INTRALESIONAL; INTRAMUSCULAR; INTRAVENOUS; SOFT TISSUE AS NEEDED
Status: DISCONTINUED | OUTPATIENT
Start: 2022-05-17 | End: 2022-05-17 | Stop reason: SURG

## 2022-05-17 RX ORDER — FENTANYL CITRATE 50 UG/ML
INJECTION, SOLUTION INTRAMUSCULAR; INTRAVENOUS
Status: COMPLETED
Start: 2022-05-17 | End: 2022-05-17

## 2022-05-17 RX ORDER — HYDRALAZINE HYDROCHLORIDE 20 MG/ML
5 INJECTION INTRAMUSCULAR; INTRAVENOUS
Status: DISCONTINUED | OUTPATIENT
Start: 2022-05-17 | End: 2022-05-17 | Stop reason: HOSPADM

## 2022-05-17 RX ORDER — CEFAZOLIN SODIUM 2 G/100ML
2 INJECTION, SOLUTION INTRAVENOUS ONCE
Status: COMPLETED | OUTPATIENT
Start: 2022-05-17 | End: 2022-05-17

## 2022-05-17 RX ORDER — SODIUM CHLORIDE 0.9 % (FLUSH) 0.9 %
3-10 SYRINGE (ML) INJECTION AS NEEDED
Status: DISCONTINUED | OUTPATIENT
Start: 2022-05-17 | End: 2022-05-17 | Stop reason: HOSPADM

## 2022-05-17 RX ORDER — NITROGLYCERIN 0.4 MG/1
0.4 TABLET SUBLINGUAL
Status: DISCONTINUED | OUTPATIENT
Start: 2022-05-17 | End: 2022-05-18 | Stop reason: HOSPADM

## 2022-05-17 RX ORDER — LISINOPRIL 40 MG/1
40 TABLET ORAL DAILY
Status: DISCONTINUED | OUTPATIENT
Start: 2022-05-18 | End: 2022-05-18 | Stop reason: HOSPADM

## 2022-05-17 RX ORDER — GUAIFENESIN 600 MG/1
1200 TABLET, EXTENDED RELEASE ORAL EVERY 12 HOURS SCHEDULED
Status: DISCONTINUED | OUTPATIENT
Start: 2022-05-17 | End: 2022-05-18 | Stop reason: HOSPADM

## 2022-05-17 RX ORDER — SODIUM CHLORIDE, SODIUM LACTATE, POTASSIUM CHLORIDE, CALCIUM CHLORIDE 600; 310; 30; 20 MG/100ML; MG/100ML; MG/100ML; MG/100ML
INJECTION, SOLUTION INTRAVENOUS CONTINUOUS PRN
Status: DISCONTINUED | OUTPATIENT
Start: 2022-05-17 | End: 2022-05-17 | Stop reason: SURG

## 2022-05-17 RX ORDER — BUPIVACAINE HCL/0.9 % NACL/PF 0.125 %
PLASTIC BAG, INJECTION (ML) EPIDURAL AS NEEDED
Status: DISCONTINUED | OUTPATIENT
Start: 2022-05-17 | End: 2022-05-17

## 2022-05-17 RX ORDER — MAGNESIUM HYDROXIDE 1200 MG/15ML
LIQUID ORAL AS NEEDED
Status: DISCONTINUED | OUTPATIENT
Start: 2022-05-17 | End: 2022-05-17 | Stop reason: HOSPADM

## 2022-05-17 RX ORDER — PROMETHAZINE HYDROCHLORIDE 25 MG/1
25 TABLET ORAL ONCE AS NEEDED
Status: DISCONTINUED | OUTPATIENT
Start: 2022-05-17 | End: 2022-05-17 | Stop reason: HOSPADM

## 2022-05-17 RX ORDER — IPRATROPIUM BROMIDE AND ALBUTEROL SULFATE 2.5; .5 MG/3ML; MG/3ML
SOLUTION RESPIRATORY (INHALATION)
Status: COMPLETED
Start: 2022-05-17 | End: 2022-05-17

## 2022-05-17 RX ORDER — FUROSEMIDE 20 MG/1
20 TABLET ORAL DAILY
Status: DISCONTINUED | OUTPATIENT
Start: 2022-05-17 | End: 2022-05-18 | Stop reason: HOSPADM

## 2022-05-17 RX ORDER — ONDANSETRON 2 MG/ML
4 INJECTION INTRAMUSCULAR; INTRAVENOUS ONCE AS NEEDED
Status: DISCONTINUED | OUTPATIENT
Start: 2022-05-17 | End: 2022-05-17 | Stop reason: HOSPADM

## 2022-05-17 RX ORDER — MORPHINE SULFATE 2 MG/ML
2 INJECTION, SOLUTION INTRAMUSCULAR; INTRAVENOUS
Status: DISCONTINUED | OUTPATIENT
Start: 2022-05-17 | End: 2022-05-18 | Stop reason: HOSPADM

## 2022-05-17 RX ORDER — THROMBIN HUMAN AND FIBRINOGEN 2; 5.5 [USP'U]/1; MG/1
PATCH TOPICAL AS NEEDED
Status: DISCONTINUED | OUTPATIENT
Start: 2022-05-17 | End: 2022-05-17 | Stop reason: HOSPADM

## 2022-05-17 RX ORDER — SODIUM CHLORIDE 0.9 % (FLUSH) 0.9 %
10 SYRINGE (ML) INJECTION EVERY 12 HOURS SCHEDULED
Status: DISCONTINUED | OUTPATIENT
Start: 2022-05-17 | End: 2022-05-18 | Stop reason: HOSPADM

## 2022-05-17 RX ORDER — BUPIVACAINE HCL/0.9 % NACL/PF 0.1 %
2 PLASTIC BAG, INJECTION (ML) EPIDURAL EVERY 8 HOURS
Status: COMPLETED | OUTPATIENT
Start: 2022-05-17 | End: 2022-05-18

## 2022-05-17 RX ORDER — ETOMIDATE 2 MG/ML
INJECTION INTRAVENOUS AS NEEDED
Status: DISCONTINUED | OUTPATIENT
Start: 2022-05-17 | End: 2022-05-17 | Stop reason: SURG

## 2022-05-17 RX ORDER — ALBUTEROL SULFATE 2.5 MG/3ML
SOLUTION RESPIRATORY (INHALATION)
Status: COMPLETED
Start: 2022-05-17 | End: 2022-05-17

## 2022-05-17 RX ADMIN — ROSUVASTATIN 20 MG: 20 TABLET, FILM COATED ORAL at 17:26

## 2022-05-17 RX ADMIN — ONDANSETRON 4 MG: 2 INJECTION INTRAMUSCULAR; INTRAVENOUS at 12:56

## 2022-05-17 RX ADMIN — PROTAMINE SULFATE 50 MG: 10 INJECTION, SOLUTION INTRAVENOUS at 12:51

## 2022-05-17 RX ADMIN — ALBUMIN, HUMAN INJ 5% 250 ML: 5 SOLUTION at 15:25

## 2022-05-17 RX ADMIN — SODIUM CHLORIDE, POTASSIUM CHLORIDE, SODIUM LACTATE AND CALCIUM CHLORIDE 9 ML/HR: 600; 310; 30; 20 INJECTION, SOLUTION INTRAVENOUS at 09:48

## 2022-05-17 RX ADMIN — HYDROCODONE BITARTRATE AND ACETAMINOPHEN 1 TABLET: 7.5; 325 TABLET ORAL at 16:59

## 2022-05-17 RX ADMIN — FENTANYL CITRATE 50 MCG: 50 INJECTION, SOLUTION INTRAMUSCULAR; INTRAVENOUS at 14:05

## 2022-05-17 RX ADMIN — NICARDIPINE HYDROCHLORIDE 0.2 MG: 0.1 INJECTION, SOLUTION INTRAVENOUS at 12:46

## 2022-05-17 RX ADMIN — HYDROCODONE BITARTRATE AND ACETAMINOPHEN 1 TABLET: 7.5; 325 TABLET ORAL at 21:19

## 2022-05-17 RX ADMIN — CEFAZOLIN 2 G: 10 INJECTION, POWDER, FOR SOLUTION INTRAVENOUS at 19:56

## 2022-05-17 RX ADMIN — LIDOCAINE HYDROCHLORIDE 0.5 ML: 10 INJECTION, SOLUTION EPIDURAL; INFILTRATION; INTRACAUDAL; PERINEURAL at 09:47

## 2022-05-17 RX ADMIN — HEPARIN SODIUM 5000 UNITS: 1000 INJECTION, SOLUTION INTRAVENOUS; SUBCUTANEOUS at 12:29

## 2022-05-17 RX ADMIN — PHENYLEPHRINE HYDROCHLORIDE 0.4 MCG/KG/MIN: 10 INJECTION INTRAVENOUS at 12:35

## 2022-05-17 RX ADMIN — FAMOTIDINE 20 MG: 20 TABLET ORAL at 09:47

## 2022-05-17 RX ADMIN — ROCURONIUM BROMIDE 50 MG: 10 INJECTION, SOLUTION INTRAVENOUS at 12:12

## 2022-05-17 RX ADMIN — ETOMIDATE 8 MG: 2 INJECTION, SOLUTION INTRAVENOUS at 12:11

## 2022-05-17 RX ADMIN — SODIUM CHLORIDE, POTASSIUM CHLORIDE, SODIUM LACTATE AND CALCIUM CHLORIDE: 600; 310; 30; 20 INJECTION, SOLUTION INTRAVENOUS at 12:17

## 2022-05-17 RX ADMIN — Medication 10 ML: at 21:20

## 2022-05-17 RX ADMIN — FENTANYL CITRATE 50 MCG: 50 INJECTION, SOLUTION INTRAMUSCULAR; INTRAVENOUS at 12:10

## 2022-05-17 RX ADMIN — IPRATROPIUM BROMIDE AND ALBUTEROL SULFATE 3 ML: 2.5; .5 SOLUTION RESPIRATORY (INHALATION) at 09:23

## 2022-05-17 RX ADMIN — DEXAMETHASONE SODIUM PHOSPHATE 8 MG: 4 INJECTION, SOLUTION INTRA-ARTICULAR; INTRALESIONAL; INTRAMUSCULAR; INTRAVENOUS; SOFT TISSUE at 12:17

## 2022-05-17 RX ADMIN — ALBUMIN HUMAN 250 ML: 0.05 INJECTION, SOLUTION INTRAVENOUS at 15:25

## 2022-05-17 RX ADMIN — GUAIFENESIN 1200 MG: 600 TABLET, EXTENDED RELEASE ORAL at 20:00

## 2022-05-17 RX ADMIN — LIDOCAINE HYDROCHLORIDE 80 MG: 10 INJECTION, SOLUTION INFILTRATION; PERINEURAL at 12:11

## 2022-05-17 RX ADMIN — LABETALOL HYDROCHLORIDE 5 MG: 5 INJECTION, SOLUTION INTRAVENOUS at 12:47

## 2022-05-17 RX ADMIN — PROPOFOL 50 MG: 10 INJECTION, EMULSION INTRAVENOUS at 12:11

## 2022-05-17 RX ADMIN — FENTANYL CITRATE 50 MCG: 50 INJECTION, SOLUTION INTRAMUSCULAR; INTRAVENOUS at 12:21

## 2022-05-17 RX ADMIN — LIDOCAINE HYDROCHLORIDE 5 ML: 20 SOLUTION ORAL; TOPICAL at 17:27

## 2022-05-17 RX ADMIN — NICARDIPINE HYDROCHLORIDE 2.5 MG/HR: 25 INJECTION INTRAVENOUS at 12:46

## 2022-05-17 RX ADMIN — ALBUTEROL SULFATE 2.5 MG: 2.5 SOLUTION RESPIRATORY (INHALATION) at 13:32

## 2022-05-17 RX ADMIN — SUGAMMADEX 200 MG: 100 INJECTION, SOLUTION INTRAVENOUS at 12:59

## 2022-05-17 RX ADMIN — IPRATROPIUM BROMIDE AND ALBUTEROL SULFATE 3 ML: .5; 2.5 SOLUTION RESPIRATORY (INHALATION) at 09:23

## 2022-05-17 RX ADMIN — IPRATROPIUM BROMIDE AND ALBUTEROL SULFATE 3 ML: .5; 3 SOLUTION RESPIRATORY (INHALATION) at 20:30

## 2022-05-17 RX ADMIN — CEFAZOLIN SODIUM 2 G: 2 INJECTION, SOLUTION INTRAVENOUS at 12:14

## 2022-05-17 RX ADMIN — ASPIRIN 81 MG: 81 TABLET, COATED ORAL at 17:00

## 2022-05-17 NOTE — ANESTHESIA PROCEDURE NOTES
Airway  Urgency: elective    Date/Time: 5/17/2022 12:14 PM  Airway not difficult    General Information and Staff    Patient location during procedure: OR  CRNA/CAA: Keara Josue CRNA    Indications and Patient Condition  Indications for airway management: airway protection    Preoxygenated: yes  MILS not maintained throughout  Mask difficulty assessment: 1 - vent by mask    Final Airway Details  Final airway type: endotracheal airway      Successful airway: ETT  Cuffed: yes   Successful intubation technique: video laryngoscopy  Endotracheal tube insertion site: oral  Blade size: 3  ETT size (mm): 7.0  Cormack-Lehane Classification: grade I - full view of glottis  Placement verified by: chest auscultation and capnometry   Cuff volume (mL): 5  Measured from: lips  ETT/EBT  to lips (cm): 20  Number of attempts at approach: 1  Assessment: lips, teeth, and gum same as pre-op and atraumatic intubation    Additional Comments  Negative epigastric sounds, Breath sound equal bilaterally with symmetric chest rise and fall

## 2022-05-17 NOTE — ANESTHESIA PROCEDURE NOTES
Arterial Line      Patient reassessed immediately prior to procedure    Patient location during procedure: pre-op  Start time: 5/17/2022 10:15 AM  Stop Time:5/17/2022 10:25 AM       Line placed for hemodynamic monitoring.  Performed By   Anesthesiologist: Andrew Mills MD  Preanesthetic Checklist  Completed: patient identified, IV checked, site marked, risks and benefits discussed, surgical consent, monitors and equipment checked, pre-op evaluation and timeout performed  Arterial Line Prep   Sterile Tech: cap, gloves and sterile barriers  Prep: ChloraPrep  Patient monitoring: blood pressure monitoring, continuous pulse oximetry and EKG  Arterial Line Procedure   Laterality:right  Location:  radial artery  Catheter size: 20 G   Guidance: palpation technique  Number of attempts: 1  Successful placement: yes  Post Assessment   Dressing Type: line sutured, occlusive dressing applied, secured with tape and wrist guard applied.   Complications no  Circ/Move/Sens Assessment: normal and unchanged.   Patient Tolerance: patient tolerated the procedure well with no apparent complications

## 2022-05-17 NOTE — OP NOTE
Operative Report    Preop Diagnosis: Bilateral carotid artery stenosis      Postoperative Diagnosis: Same      Procedure: Right internal carotid endarterectomy with pericardial patch closure and EEG monitoring        Surgeons: Shiraz Finn MD      Assistant: Deni Baldwin PA-C    The Assistant provided services of suctioning, irrigation and retraction.  Also, assisted in suture closure of parts of the skin incision.      Indication: Patient was furred to me with bilateral carotid stenosis 90% on the right and 70% on the left.  She understood that surgery had a risk of stroke bleed infection death renal failure and agreed to proceed.  No guarantees were made as to outcome        Description: Supine position.  Sterile prep and drape and antibiotics given.  General endotracheal anesthesia.  An incision was made on the right neck anterior to the sternocleidomastoid and through the platysma.  Care was taken to identify not injure the hypoglossal and vagus nerves.  Heparin was given.  The internal, external, and common carotid arteries were clamped.  No deleterious EEG changes were encountered.  The artery was opened there was a heavy calcific plaque with over 90% narrowing in the artery.  This was endarterectomized to a smooth surface and the plaque sent to pathology.  Brisk backbleeding was noted from the internal carotid.  Pericardial tissue patch was cut to size and the arteriotomy was closed with a pericardial tissue patch using a running 6-0 Prolene suture.  Following this the clamps were released in the proper sequential fashion.  Again no deleterious EEG changes were encountered.  Good hemostasis was obtained a Juan Manuel-Perla drain was placed in the incision closed with multiple layers of suture and the sponge and needle count ported as correct.  Estimated blood loss less than 50 mL.  There was no apparent early complications.      Please note that portions of this note were completed with a voice recognition  program. Efforts were made to edit the dictations, but occasionally words are mistranscribed.

## 2022-05-17 NOTE — ANESTHESIA PREPROCEDURE EVALUATION
Anesthesia Evaluation     Patient summary reviewed and Nursing notes reviewed   no history of anesthetic complications:  NPO Solid Status: > 8 hours  NPO Liquid Status: > 2 hours           Airway   Mallampati: II  TM distance: >3 FB  Neck ROM: full  No difficulty expected  Dental    (+) poor dentition    Pulmonary    (+) a smoker Current Smoked day of surgery, COPD severe, shortness of breath, sleep apnea on CPAP, decreased breath sounds,   Cardiovascular   Exercise tolerance: poor (<4 METS)    ECG reviewed  Rhythm: regular  Rate: normal    (+) hypertension, CAD, cardiac stents Drug eluting stent more than 12 months ago angina with exertion, PVD, hyperlipidemia,  carotid artery disease carotid bilateral      Neuro/Psych  GI/Hepatic/Renal/Endo      Musculoskeletal     Abdominal   (-) obese    Abdomen: soft.   Substance History      OB/GYN          Other   arthritis,                    Anesthesia Plan    ASA 3     general     intravenous induction     Anesthetic plan, all risks, benefits, and alternatives have been provided, discussed and informed consent has been obtained with: patient.    Plan discussed with CRNA.        CODE STATUS:

## 2022-05-17 NOTE — INTERVAL H&P NOTE
[facility] Pre-op    Full history and physical note from office is attached.    She has labored breathing, productive cough and adventitious lung sounds.  DuoNeb ordered for preop. Applied 2LNC.    VS: /75  HR 72  RR 16  T 96.3  Sat 91%RA    Immunizations:  Influenza:  2021  Pneumococcal:  UTD  Tetanus:  UTD  Covid x3: 2021    LAB Results:  Lab Results   Component Value Date    WBC 9.61 05/16/2022    HGB 15.6 05/16/2022    HCT 46.6 05/16/2022    MCV 93.8 05/16/2022     05/16/2022    NEUTROABS 7.23 (H) 01/24/2022    GLUCOSE 85 05/16/2022    BUN 11 05/16/2022    CREATININE 0.72 05/16/2022    EGFRIFNONA 68 01/24/2022     05/16/2022    K 4.5 05/16/2022    CL 94 (L) 05/16/2022    CO2 33.0 (H) 05/16/2022    MG 2.3 01/11/2021    CALCIUM 9.9 05/16/2022    ALBUMIN 4.87 01/24/2022    AST 20 01/24/2022    ALT 13 01/24/2022    BILITOT 0.3 01/24/2022    INR 0.95 05/16/2022       Cancer Staging (if applicable)  Cancer Patient: __ yes __no __unknown__N/A; If yes, clinical stage T:__ N:__M:__, stage group or __N/A      Impression: Carotid stenosis, bilateral      Plan: Right CAROTID ENDARTERECTOMY WITH EEG    Agree with the above.  Patient with 90% right internal carotid stenosis.  She is aware that surgery has risk of stroke bleeding infection death and no guarantees been made as to outcome.  These risks were explained in the office and then again this morning for the second occasion and on both occasions she agreed to proceed.  Tarah Lombardo, APRN   5/17/2022   09:18 EDT

## 2022-05-17 NOTE — ANESTHESIA POSTPROCEDURE EVALUATION
Patient: Cassy A May    Procedure Summary     Date: 05/17/22 Room / Location:  [facility]  [facility]    Anesthesia Start: 1204 Anesthesia Stop:     Procedure: RIGHT CAROTID ENDARTERECTOMY WITH EEG (Right Neck) Diagnosis:       Carotid stenosis, asymptomatic, bilateral      (Carotid stenosis, asymptomatic, bilateral [I65.23])    Surgeons: Sihraz Finn MD Provider: Andrew Mills MD    Anesthesia Type: general ASA Status: 3          Anesthesia Type: general    Vitals  Vitals Value Taken Time   /67 05/17/22 1330   Temp     Pulse 64 05/17/22 1334   Resp     SpO2 98 % 05/17/22 1334   Vitals shown include unvalidated device data.        Post Anesthesia Care and Evaluation    Patient location during evaluation: PACU  Patient participation: complete - patient participated  Level of consciousness: awake and alert  Pain management: adequate  Airway patency: patent  Anesthetic complications: No anesthetic complications  PONV Status: none  Cardiovascular status: hemodynamically stable and acceptable  Respiratory status: nonlabored ventilation, acceptable and nasal cannula  Hydration status: acceptable

## 2022-05-17 NOTE — PROGRESS NOTES
Intensivist Note     5/17/2022  Hospital Day: 0  Day of Surgery  ICU Stays Timeline            Hospital Admission: 05/17/22 0854 - Current  ICU stays: 1      In Date/Time Event Department ICU Stay Duration     05/17/22 0854 Admission  [facility]      05/17/22 1629 Transfer In  [facility] 1 hour 4 minutes             Ms. Cassy Olvera, 76 y.o. female is followed for:    Stenosis of right carotid artery s/p Rt CEA 5/17/22 by Dr. Finn    Essential hypertension    Grade I diastolic dysfunction    Mixed hyperlipidemia    CAD prior RCA stents x2    COPD (chronic obstructive pulmonary disease) (HCC)    Tobacco use       SUBJECTIVE     76-year-old  white female active smoker with a history of hypertension and mild diastolic dysfunction, hyperlipidemia, CAD with previous RCA stents x 2, and COPD.  Patient is followed in [place] by her primary physician and cardiologist who recently obtained a routine carotid duplex.  She had had no symptoms other than some postural dizziness and specifically denied amaurosis fugax, dysarthria, dysphagia, unilateral weakness, seizures, or visual abnormalities.  Also denied any previous history of a CVA.  Her only symptom was occasional postural dizziness but she is on home diuretics and antihypertensives.  This study revealed a 90% right ICA stenosis and greater than 70% left ICA stenosis and she had a follow-up CT angiogram revealing bilateral carotid stenoses as noted above.  She was subsequently referred to Dr. Finn 5/9/2022 and was scheduled today for a right carotid endarterectomy 5/17/2022 with plans for a left carotid endarterectomy a few weeks later after recovery.  Patient was electively admitted today and underwent uncomplicated right internal carotid endarterectomy with pericardial patch closure under EEG monitoring.  No immediate complications were noted, and she was transferred to the ICU for monitoring.    Interval history: Only complaint is of pain at the  operative site and a sore throat.  Blood pressure is on the low side at 96/36 but she is not requiring pressors.  In addition she has a sinus bradycardia at 52 (baseline heart rate on preoperative EKG was 69 bpm).  She denies dyspnea, cough, purulent sputum production, or hemoptysis.  Denies nausea, vomiting, diarrhea or difficulty voiding.  Denies any visual abnormalities, diplopia, unilateral weakness, dysarthria, or dysphagia.      ROS: Per subjective, all other systems reviewed and were negative.    Past Medical History:   Diagnosis Date   • Carotid artery disease (HCC)    • COPD (chronic obstructive pulmonary disease) (HCC)    • Coronary artery disease    • COVID-19 vaccine administered 1st- 04/2021 2nd-  05/2021 - Moderna ( Booster 11/2021) Moderna    • Hyperlipidemia    • Hypertension    • On home oxygen therapy     uses PRN and at HS       Past Surgical History:   Procedure Laterality Date   • CARDIAC CATHETERIZATION  2018    stents x 2 to RCA   • COLONOSCOPY W/ BIOPSIES     • CORONARY STENT PLACEMENT  01/20/2014    Stent x1 01/20/14   • ELBOW PROCEDURE Left     fracture repair   • HYSTERECTOMY         Current Outpatient Medications on File Prior to Encounter   Medication Sig Dispense Refill   • amLODIPine (NORVASC) 5 MG tablet Take 1 tablet by mouth Daily. 90 tablet 3   • aspirin 81 MG tablet Take 1 tablet by mouth Daily. 90 tablet 3   • Coenzyme Q10 (Co Q-10) 100 MG capsule Take 100 mg by mouth 2 (Two) Times a Day. 180 capsule 3   • furosemide (LASIX) 20 MG tablet Take 1 tablet by mouth Daily. 90 tablet 3   • hydroCHLOROthiazide (MICROZIDE) 12.5 MG capsule Take 1 capsule by mouth Daily As Needed (edema). 30 capsule 11   • lisinopril (PRINIVIL,ZESTRIL) 40 MG tablet Take 1 tablet by mouth Daily. 90 tablet 3   • rosuvastatin (CRESTOR) 20 MG tablet Take 1 tablet by mouth Daily. 90 tablet 3   • nitroglycerin (NITROSTAT) 0.4 MG SL tablet Place 1 tablet under the tongue Every 5 (Five) Minutes As Needed for  "Chest Pain. Take no more than 3 doses in 15 minutes. 25 tablet 6     No Known Allergies     Family History   Problem Relation Age of Onset   • Hypertension Mother    • Heart disease Mother    • Diabetes Mother    • Heart attack Father        Social History     Socioeconomic History   • Marital status:    • Number of children: 2   Tobacco Use   • Smoking status: Current Every Day Smoker     Packs/day: 1.50     Years: 50.00     Pack years: 75.00     Types: Cigarettes   • Smokeless tobacco: Never Used   Vaping Use   • Vaping Use: Never used   Substance and Sexual Activity   • Alcohol use: No   • Drug use: No   • Sexual activity: Defer     The patient's relevant PMH, PSH, FH, and SH were reviewed and updated in Epic as appropriate. Allergies and Medications reviewed.    OBJECTIVE     BP 91/51   Pulse (!) 48   Temp 97.5 °F (36.4 °C) (Oral)   Resp 22   Ht 162.6 cm (64\")   Wt 78 kg (172 lb)   SpO2 96%   BMI 29.52 kg/m²   Oxygen Concentration (%): 50  Flow (L/min): 6    Flowsheet Rows    Flowsheet Row First Filed Value   Admission Height 162.6 cm (64\") Documented at 05/17/2022 0917   Admission Weight 78 kg (172 lb) Documented at 05/17/2022 0917        Intake & Output (last day)       05/16 0701  05/17 0700 05/17 0701  05/18 0700    P.O.  60    I.V. (mL/kg)  1450 (18.6)    Total Intake(mL/kg)  1510 (19.4)    Drains  10    Total Output  10    Net  +1500                Exam:  General Exam:  Well-developed elderly white female propped up in bed in NAD but restless  HEENT: Pupils equal and reactive. Nose and throat clear.  Neck:                          Supple, no JVD, thyromegaly, or adenopathy  Lungs: Scattered wheezes and slightly diminished breath sounds  Cardiovascular: RRR without murmurs or gallops.  Sinus bradycardia at 52 bpm  Abdomen: Soft nontender without organomegaly or masses.   and rectal: Deferred.  Extremities: No cyanosis clubbing edema.  Neurologic:                 Symmetric strength. No focal " deficits.  Speech clear.  Oriented.  Appears anxious and restless    CXR 5/16/2022: Heart size normal.  Lung fields clear.  There is some calcium in the walls of the thoracic aorta.    INFUSIONS  lactated ringers, 9 mL/hr, Last Rate: 9 mL/hr (05/17/22 1204)  niCARdipine, 5-15 mg/hr        Results from last 7 days   Lab Units 05/16/22  1513   WBC 10*3/mm3 9.61   HEMOGLOBIN g/dL 15.6   HEMATOCRIT % 46.6   PLATELETS 10*3/mm3 341     Results from last 7 days   Lab Units 05/16/22  1513   SODIUM mmol/L 137   POTASSIUM mmol/L 4.5   CHLORIDE mmol/L 94*   CO2 mmol/L 33.0*   BUN mg/dL 11   CREATININE mg/dL 0.72   GLUCOSE mg/dL 85   CALCIUM mg/dL 9.9               No results found for: SEDRATE  No results found for: BNP  Lab Results   Component Value Date    CKTOTAL 72 01/21/2014    CKMB 5.5 (H) 01/21/2014    CKMBINDEX CKMB Index not calculated when CK Total <80 01/21/2014    TROPONINI 0.44 01/21/2014     Lab Results   Component Value Date    TSH 2.260 01/24/2022     No results found for: LACTATE  No results found for: CORTISOL    I reviewed the patient's results, images and medication.    Assessment & Plan   ASSESSMENT        Stenosis of right carotid artery s/p Rt CEA 5/17/22 by Dr. Finn    Essential hypertension    Grade I diastolic dysfunction    Mixed hyperlipidemia    CAD prior RCA stents x2    COPD (chronic obstructive pulmonary disease) (HCC)    Tobacco use      DISCUSSION: Acceptable post procedural course with no immediate neurologic application and no evidence of bleeding at the site.  Blood pressure is within prescribed parameters without the need for any parenteral therapy.  Patient is bradycardic more so than usual and this is likely related to manipulation of the carotid body.  Patient alert and cooperative at this time and I discussed smoking cessation with her although she does not seem interested.  I do hear some mild wheezing related to her cigarette abuse and COPD.  Also note that her serum bicarbonate  is mildly elevated suggesting some degree of baseline hypercarbia.  I do not however have any preoperative PFTs    PLAN     Resume home diuretics and antihypertensives tomorrow  Continue Crestor  Smoking cessation  Ulcer and DVT prophylaxis  Baseline ABG and if hypercarbic be careful with pain and antianxiety meds  Standard bronchodilator therapy  Mucolytic's      Plan of care and goals reviewed with multidisciplinary team at daily rounds.    I discussed the patient's findings and my recommendations with patient, family and nursing staff    Time spent Critical care 30 min (It does not include procedure time).    Electronically signed by Sukhwinder Grove MD, 05/17/22, 5:33 PM EDT.   Pulmonary / Critical care medicine

## 2022-05-18 ENCOUNTER — READMISSION MANAGEMENT (OUTPATIENT)
Dept: CALL CENTER | Facility: HOSPITAL | Age: 77
End: 2022-05-18

## 2022-05-18 VITALS
RESPIRATION RATE: 22 BRPM | SYSTOLIC BLOOD PRESSURE: 100 MMHG | TEMPERATURE: 98.4 F | WEIGHT: 172 LBS | OXYGEN SATURATION: 96 % | HEART RATE: 62 BPM | BODY MASS INDEX: 29.37 KG/M2 | DIASTOLIC BLOOD PRESSURE: 49 MMHG | HEIGHT: 64 IN

## 2022-05-18 LAB
ANION GAP SERPL CALCULATED.3IONS-SCNC: 11 MMOL/L (ref 5–15)
BASOPHILS # BLD AUTO: 0.04 10*3/MM3 (ref 0–0.2)
BASOPHILS NFR BLD AUTO: 0.3 % (ref 0–1.5)
BUN SERPL-MCNC: 14 MG/DL (ref 8–23)
BUN/CREAT SERPL: 18.9 (ref 7–25)
CALCIUM SPEC-SCNC: 8.7 MG/DL (ref 8.6–10.5)
CHLORIDE SERPL-SCNC: 102 MMOL/L (ref 98–107)
CHOLEST SERPL-MCNC: 88 MG/DL (ref 0–200)
CO2 SERPL-SCNC: 25 MMOL/L (ref 22–29)
CREAT SERPL-MCNC: 0.74 MG/DL (ref 0.57–1)
DEPRECATED RDW RBC AUTO: 42.5 FL (ref 37–54)
EGFRCR SERPLBLD CKD-EPI 2021: 84 ML/MIN/1.73
EOSINOPHIL # BLD AUTO: 0.03 10*3/MM3 (ref 0–0.4)
EOSINOPHIL NFR BLD AUTO: 0.2 % (ref 0.3–6.2)
ERYTHROCYTE [DISTWIDTH] IN BLOOD BY AUTOMATED COUNT: 12.4 % (ref 12.3–15.4)
GLUCOSE SERPL-MCNC: 125 MG/DL (ref 65–99)
HCT VFR BLD AUTO: 36.4 % (ref 34–46.6)
HDLC SERPL-MCNC: 52 MG/DL (ref 40–60)
HGB BLD-MCNC: 12.1 G/DL (ref 12–15.9)
IMM GRANULOCYTES # BLD AUTO: 0.04 10*3/MM3 (ref 0–0.05)
IMM GRANULOCYTES NFR BLD AUTO: 0.3 % (ref 0–0.5)
LDLC SERPL CALC-MCNC: 24 MG/DL (ref 0–100)
LDLC/HDLC SERPL: 0.52 {RATIO}
LYMPHOCYTES # BLD AUTO: 1 10*3/MM3 (ref 0.7–3.1)
LYMPHOCYTES NFR BLD AUTO: 6.8 % (ref 19.6–45.3)
MAGNESIUM SERPL-MCNC: 2.1 MG/DL (ref 1.6–2.4)
MCH RBC QN AUTO: 30.8 PG (ref 26.6–33)
MCHC RBC AUTO-ENTMCNC: 33.2 G/DL (ref 31.5–35.7)
MCV RBC AUTO: 92.6 FL (ref 79–97)
MONOCYTES # BLD AUTO: 0.66 10*3/MM3 (ref 0.1–0.9)
MONOCYTES NFR BLD AUTO: 4.5 % (ref 5–12)
NEUTROPHILS NFR BLD AUTO: 12.83 10*3/MM3 (ref 1.7–7)
NEUTROPHILS NFR BLD AUTO: 87.9 % (ref 42.7–76)
NRBC BLD AUTO-RTO: 0 /100 WBC (ref 0–0.2)
PHOSPHATE SERPL-MCNC: 3.8 MG/DL (ref 2.5–4.5)
PLATELET # BLD AUTO: 272 10*3/MM3 (ref 140–450)
PMV BLD AUTO: 9.9 FL (ref 6–12)
POTASSIUM SERPL-SCNC: 4.7 MMOL/L (ref 3.5–5.2)
QT INTERVAL: 474 MS
QTC INTERVAL: 423 MS
RBC # BLD AUTO: 3.93 10*6/MM3 (ref 3.77–5.28)
SODIUM SERPL-SCNC: 138 MMOL/L (ref 136–145)
TRIGL SERPL-MCNC: 44 MG/DL (ref 0–150)
VLDLC SERPL-MCNC: 12 MG/DL (ref 5–40)
WBC NRBC COR # BLD: 14.6 10*3/MM3 (ref 3.4–10.8)

## 2022-05-18 PROCEDURE — 83735 ASSAY OF MAGNESIUM: CPT | Performed by: INTERNAL MEDICINE

## 2022-05-18 PROCEDURE — 94664 DEMO&/EVAL PT USE INHALER: CPT

## 2022-05-18 PROCEDURE — 25010000002 KETOROLAC TROMETHAMINE PER 15 MG: Performed by: THORACIC SURGERY (CARDIOTHORACIC VASCULAR SURGERY)

## 2022-05-18 PROCEDURE — 80048 BASIC METABOLIC PNL TOTAL CA: CPT | Performed by: PHYSICIAN ASSISTANT

## 2022-05-18 PROCEDURE — 25010000002 CEFAZOLIN PER 500 MG: Performed by: PHYSICIAN ASSISTANT

## 2022-05-18 PROCEDURE — 80061 LIPID PANEL: CPT | Performed by: INTERNAL MEDICINE

## 2022-05-18 PROCEDURE — 99024 POSTOP FOLLOW-UP VISIT: CPT | Performed by: THORACIC SURGERY (CARDIOTHORACIC VASCULAR SURGERY)

## 2022-05-18 PROCEDURE — 85025 COMPLETE CBC W/AUTO DIFF WBC: CPT | Performed by: PHYSICIAN ASSISTANT

## 2022-05-18 PROCEDURE — 94799 UNLISTED PULMONARY SVC/PX: CPT

## 2022-05-18 PROCEDURE — 84100 ASSAY OF PHOSPHORUS: CPT | Performed by: INTERNAL MEDICINE

## 2022-05-18 PROCEDURE — 94761 N-INVAS EAR/PLS OXIMETRY MLT: CPT

## 2022-05-18 PROCEDURE — 25010000002 ONDANSETRON PER 1 MG: Performed by: PHYSICIAN ASSISTANT

## 2022-05-18 RX ORDER — KETOROLAC TROMETHAMINE 15 MG/ML
15 INJECTION, SOLUTION INTRAMUSCULAR; INTRAVENOUS ONCE AS NEEDED
Status: COMPLETED | OUTPATIENT
Start: 2022-05-18 | End: 2022-05-18

## 2022-05-18 RX ORDER — CLOPIDOGREL BISULFATE 75 MG/1
75 TABLET ORAL DAILY
Qty: 30 TABLET | Refills: 11 | Status: SHIPPED | OUTPATIENT
Start: 2022-05-18 | End: 2023-05-18

## 2022-05-18 RX ADMIN — CEFAZOLIN 2 G: 10 INJECTION, POWDER, FOR SOLUTION INTRAVENOUS at 04:45

## 2022-05-18 RX ADMIN — GUAIFENESIN 1200 MG: 600 TABLET, EXTENDED RELEASE ORAL at 08:16

## 2022-05-18 RX ADMIN — AMLODIPINE BESYLATE 5 MG: 5 TABLET ORAL at 08:16

## 2022-05-18 RX ADMIN — LISINOPRIL 40 MG: 40 TABLET ORAL at 08:16

## 2022-05-18 RX ADMIN — ROSUVASTATIN 20 MG: 20 TABLET, FILM COATED ORAL at 08:16

## 2022-05-18 RX ADMIN — Medication 10 ML: at 08:16

## 2022-05-18 RX ADMIN — HYDROCODONE BITARTRATE AND ACETAMINOPHEN 1 TABLET: 7.5; 325 TABLET ORAL at 02:33

## 2022-05-18 RX ADMIN — FUROSEMIDE 20 MG: 20 TABLET ORAL at 08:16

## 2022-05-18 RX ADMIN — HYDROCODONE BITARTRATE AND ACETAMINOPHEN 1 TABLET: 7.5; 325 TABLET ORAL at 12:00

## 2022-05-18 RX ADMIN — IPRATROPIUM BROMIDE AND ALBUTEROL SULFATE 3 ML: .5; 3 SOLUTION RESPIRATORY (INHALATION) at 07:16

## 2022-05-18 RX ADMIN — ONDANSETRON 4 MG: 2 INJECTION INTRAMUSCULAR; INTRAVENOUS at 03:06

## 2022-05-18 RX ADMIN — KETOROLAC TROMETHAMINE 15 MG: 15 INJECTION, SOLUTION INTRAMUSCULAR; INTRAVENOUS at 06:50

## 2022-05-18 RX ADMIN — ASPIRIN 81 MG: 81 TABLET, COATED ORAL at 08:16

## 2022-05-18 NOTE — CASE MANAGEMENT/SOCIAL WORK
Discharge Planning Assessment  [facility]     Patient Name: Cassy Olvera  MRN: [id number]  Today's Date: 5/18/2022    Admit Date: 5/17/2022     Discharge Needs Assessment     Row Name 05/18/22 1019       Living Environment    People in Home spouse    Current Living Arrangements home    Provides Primary Care For no one, unable/limited ability to care for self    Family Caregiver if Needed spouse    Quality of Family Relationships helpful;involved    Able to Return to Prior Arrangements yes       Transition Planning    Patient/Family Anticipates Transition to home with family    Transportation Anticipated family or friend will provide       Discharge Needs Assessment    Equipment Currently Used at Home oxygen    Current Discharge Risk chronically ill               Discharge Plan     Row Name 05/18/22 1021       Plan    Plan home    Patient/Family in Agreement with Plan yes    Plan Comments Mrs. Olvera lives in [facility] with her spouse.  Her spouse assists her with ADL's when needed.  She is on Home O2 at 2L per [place] supplied by [employer].  She does not have a portable tank with her so I contacted Kylee with [employer] and she will bring a portable tank to patients room. She is not current with home health or PT.  She has prescription coverage for her medications.  Plan is home with spouse to transport.    Final Discharge Disposition Code 01 - home or self-care              Continued Care and Services - Admitted Since 5/17/2022    Coordination has not been started for this encounter.       Expected Discharge Date and Time     Expected Discharge Date Expected Discharge Time    May 18, 2022          Demographic Summary     Row Name 05/18/22 1018       General Information    Admission Type inpatient    Reason for Consult discharge planning    Preferred Language English    General Information Comments PCP - Baltazar Joseph               Functional Status     Row Name 05/18/22 1019       Functional Status    Usual  Activity Tolerance moderate    Current Activity Tolerance moderate       Functional Status, IADL    Medications independent    Meal Preparation assistive person    Housekeeping assistive person    Laundry assistive person    Shopping assistive person               Psychosocial    No documentation.                Abuse/Neglect    No documentation.                Legal    No documentation.                Substance Abuse    No documentation.                Patient Forms    No documentation.                   Sanjuana Looney RN

## 2022-05-19 LAB
CYTO UR: NORMAL
LAB AP CASE REPORT: NORMAL
LAB AP CLINICAL INFORMATION: NORMAL
PATH REPORT.FINAL DX SPEC: NORMAL
PATH REPORT.GROSS SPEC: NORMAL

## 2022-06-06 ENCOUNTER — READMISSION MANAGEMENT (OUTPATIENT)
Dept: CALL CENTER | Facility: HOSPITAL | Age: 77
End: 2022-06-06

## 2022-06-06 NOTE — OUTREACH NOTE
General Surgery Week 3 Survey    Flowsheet Row Responses   List of hospitals in [place] patient discharged from? [facility]   Does the patient have one of the following disease processes/diagnoses(primary or secondary)? General Surgery   Week 3 attempt successful? Yes   Call start time 1334   Call end time 1347   Meds reviewed with patient/caregiver? Yes   Is the patient having any side effects they believe may be caused by any medication additions or changes? No   Does the patient have all medications related to this admission filled (includes all antibiotics, pain medications, etc.) Yes   Is the patient taking all medications as directed (includes completed medication regime)? Yes   Does the patient have a follow up appointment scheduled with their surgeon? Yes   Has the patient kept scheduled appointments due by today? Yes   Comments States will be having the other carotid surgery soon. Encouraged to schedule appt with PCP. Surgeon appt 06/09/22.   Has home health visited the patient within 72 hours of discharge? N/A   Has all DME been delivered? Yes   Psychosocial issues? No   Did the patient receive a copy of their discharge instructions? Yes   Nursing interventions Reviewed instructions with patient   What is the patient's perception of their health status since discharge? Improving   Nursing interventions Nurse provided patient education   Is the patient /caregiver able to teach back basic post-op care? Drive as instructed by MD in discharge instructions, No tub bath, swimming, or hot tub until instructed by MD, Do not remove steri-strips, Practice 'cough and deep breath', Take showers only when approved by MD-sponge bathe until then, Keep incision areas clean,dry and protected, Lifting as instructed by MD in discharge instructions   Is the patient/caregiver able to teach back signs and symptoms of incisional infection? Increased redness, swelling or pain at the incisonal site, Incisional warmth, Fever, Increased drainage  or bleeding, Pus or odor from incision   Is the patient/caregiver able to teach back steps to recovery at home? Set small, achievable goals for return to baseline health, Eat a well-balance diet, Rest and rebuild strength, gradually increase activity, Make a list of questions for surgeon's appointment   If the patient is a current smoker, are they able to teach back resources for cessation? Smoking cessation medications, Smoking cessation classes, Smoking cessation support groups, [phone]  [\[facility]\"" has cut back on her smoking.]   Is the patient/caregiver able to teach back the hierarchy of who to call/visit for symptoms/problems? PCP, Specialist, Home health nurse, Urgent Care, ED, 911 Yes   Week 3 call completed? Yes   Revoked No further contact(revokes)-requires comment   Is the patient interested in additional calls from an ambulatory ?  NOTE:  applies to high risk patients requiring additional follow-up. No   Graduated/Revoked comments Declined any further f/u calls-[facility] will call if needed.   Wrap up additional comments Patient states is improving-denies any s/s of infection at her incision. Denies any needs today. \[facility]\"" has been busy with f/u appts, and will call if any questions/concerns.          SUSANA CHIN - Registered Nurse

## 2022-06-16 ENCOUNTER — OFFICE VISIT (OUTPATIENT)
Dept: CARDIAC SURGERY | Facility: CLINIC | Age: 77
End: 2022-06-16

## 2022-06-16 VITALS
WEIGHT: 176 LBS | HEART RATE: 73 BPM | OXYGEN SATURATION: 93 % | DIASTOLIC BLOOD PRESSURE: 67 MMHG | BODY MASS INDEX: 31.18 KG/M2 | HEIGHT: 63 IN | TEMPERATURE: 98.6 F | SYSTOLIC BLOOD PRESSURE: 150 MMHG

## 2022-06-16 DIAGNOSIS — I65.21 STENOSIS OF RIGHT CAROTID ARTERY: ICD-10-CM

## 2022-06-16 DIAGNOSIS — I65.23 CAROTID STENOSIS, ASYMPTOMATIC, BILATERAL: Primary | ICD-10-CM

## 2022-06-16 PROCEDURE — 99024 POSTOP FOLLOW-UP VISIT: CPT | Performed by: NURSE PRACTITIONER

## 2022-06-16 NOTE — PROGRESS NOTES
[facility] Cardiothoracic Surgery Office Follow Up Note     Date of Encounter: 2022     Name: Cassy Olvera  : 1945     Referred By: No ref. provider found  PCP: Baltazar Joseph MD    Chief Complaint:    Chief Complaint   Patient presents with   • Post-op     Hospital follow up s/p right carotid endarterectomy 22.       Subjective      History of Present Illness:    Cassy Olvera is a 76 y.o. female current smoker, with history of HTN, HLD on statin therapy, COPD on supplemental oxygen as needed, PVD, CAD with prior RCA stenting x2, and bilateral carotid artery stenosis s/p right carotid endarterectomy on 2022 by Dr. Finn.  Patient had uncomplicated postoperative course and was discharged on POD #1. Preoperative imaging demonstrating 90% right internal carotid artery stenosis and over 70% left-sided stenosis.  Anticipated left carotid endarterectomy once patient recovered well from right carotid endarterectomy.  Patient presents today for postoperative follow-up. Pt denies hx of TIA or CVA since last visit, has had no new focal neurologic dysfunction, specifically vision changes or amaurosis fugax.  Patient's right CEA incision completely healed.  She denies any fevers, chills, or body aches.  She been compliant with ASA/Plavix therapy.  She is ready to proceed with left CEA.    Review of Systems:  Review of Systems   Constitutional: Negative. Negative for chills, decreased appetite, fever and malaise/fatigue.   Cardiovascular: Positive for chest pain, dyspnea on exertion and leg swelling. Negative for claudication, irregular heartbeat, near-syncope, orthopnea, palpitations and syncope.   Respiratory: Positive for cough, shortness of breath and wheezing. Negative for hemoptysis and sputum production.    Hematologic/Lymphatic: Negative for bleeding problem. Bruises/bleeds easily.   Skin: Negative.  Negative for color change, poor wound healing and rash.    Musculoskeletal: Positive for back pain. Negative for falls and joint pain.   Gastrointestinal: Negative.  Negative for abdominal pain, constipation, diarrhea, nausea and vomiting.   Neurological: Negative.  Negative for focal weakness, numbness and paresthesias.   Psychiatric/Behavioral: Negative.  Negative for depression. The patient does not have insomnia.    Allergic/Immunologic: Negative.        I have reviewed the following portions of the patient's history: allergies, current medications, past family history, past medical history, past social history, past surgical history, problem list and ROS and confirm it's accurate.    Allergies:  No Known Allergies    Medications:      Current Outpatient Medications:   •  amLODIPine (NORVASC) 5 MG tablet, Take 1 tablet by mouth Daily., Disp: 90 tablet, Rfl: 3  •  aspirin 81 MG tablet, Take 1 tablet by mouth Daily., Disp: 90 tablet, Rfl: 3  •  clopidogrel (Plavix) 75 MG tablet, Take 1 tablet by mouth Daily., Disp: 30 tablet, Rfl: 11  •  Coenzyme Q10 (Co Q-10) 100 MG capsule, Take 100 mg by mouth 2 (Two) Times a Day., Disp: 180 capsule, Rfl: 3  •  furosemide (LASIX) 20 MG tablet, Take 1 tablet by mouth Daily., Disp: 90 tablet, Rfl: 3  •  hydroCHLOROthiazide (MICROZIDE) 12.5 MG capsule, Take 1 capsule by mouth Daily As Needed (edema)., Disp: 30 capsule, Rfl: 11  •  lisinopril (PRINIVIL,ZESTRIL) 40 MG tablet, Take 1 tablet by mouth Daily., Disp: 90 tablet, Rfl: 3  •  nitroglycerin (NITROSTAT) 0.4 MG SL tablet, Place 1 tablet under the tongue Every 5 (Five) Minutes As Needed for Chest Pain. Take no more than 3 doses in 15 minutes., Disp: 25 tablet, Rfl: 6  •  rosuvastatin (CRESTOR) 20 MG tablet, Take 1 tablet by mouth Daily., Disp: 90 tablet, Rfl: 3  No current facility-administered medications for this visit.    Facility-Administered Medications Ordered in Other Visits:   •  Chlorhexidine Gluconate Cloth 2 % pads 1 application, 1 application, Topical, Q12H PRN, Scott,  "CONOR Sabillon    History:   Past Medical History:   Diagnosis Date   • Carotid artery disease (HCC)    • COPD (chronic obstructive pulmonary disease) (HCC)    • Coronary artery disease    • COVID-19 vaccine administered 1st- 04/2021 2nd- 05/2021 - Moderna ( Booster 11/2021) Moderna    • Hyperlipidemia    • Hypertension    • On home oxygen therapy     uses PRN and at HS       Past Surgical History:   Procedure Laterality Date   • CARDIAC CATHETERIZATION  2018    stents x 2 to RCA   • CAROTID ENDARTERECTOMY Right 5/17/2022    Procedure: CAROTID ENDARTERECTOMY WITH EEG RIGHT;  Surgeon: Shiraz Finn MD;  Location: [facility];  Service: Vascular;  Laterality: Right;   • COLONOSCOPY W/ BIOPSIES     • CORONARY STENT PLACEMENT  01/20/2014    Stent x1 01/20/14   • ELBOW PROCEDURE Left     fracture repair   • HYSTERECTOMY         Social History     Socioeconomic History   • Marital status:    • Number of children: 2   Tobacco Use   • Smoking status: Current Every Day Smoker     Packs/day: 1.50     Years: 50.00     Pack years: 75.00     Types: Cigarettes   • Smokeless tobacco: Never Used   Vaping Use   • Vaping Use: Never used   Substance and Sexual Activity   • Alcohol use: No   • Drug use: No   • Sexual activity: Defer        Family History   Problem Relation Age of Onset   • Hypertension Mother    • Heart disease Mother    • Diabetes Mother    • Heart attack Father        Objective     Physical Exam:  Vitals:    06/16/22 1017 06/16/22 1018   BP: 156/68 150/67   BP Location: Right arm Left arm   Patient Position: Sitting Sitting   Pulse: 73    Temp: 98.6 °F (37 °C)    SpO2: 93%    Weight: 79.8 kg (176 lb)    Height: 160 cm (63\")       Body mass index is 31.18 kg/m².    Physical Exam  Vitals and nursing note reviewed.   Constitutional:       Appearance: Normal appearance. She is normal weight.   HENT:      Head: Normocephalic and atraumatic.      Mouth/Throat:      Tongue: Tongue does not deviate from midline. "   Eyes:      General: Lids are normal. Gaze aligned appropriately. No visual field deficit.     Pupils: Pupils are equal, round, and reactive to light.      Comments: No evidence of ptosis   Neck:      Vascular: Normal carotid pulses. No carotid bruit.      Trachea: Trachea normal.      Comments: Right CEA incision: well healing, no surrounding erythema, swelling, or warmth; no underlying hematoma present  Cardiovascular:      Rate and Rhythm: Normal rate and regular rhythm.      Pulses: Normal pulses.      Heart sounds: Normal heart sounds, S1 normal and S2 normal. No murmur heard.  Pulmonary:      Effort: Pulmonary effort is normal.      Breath sounds: Normal breath sounds.   Abdominal:      Palpations: Abdomen is soft.   Musculoskeletal:         General: Normal range of motion.      Cervical back: Normal range of motion and neck supple. No edema, erythema, rigidity or crepitus. Normal range of motion.   Skin:     General: Skin is warm and dry.      Capillary Refill: Capillary refill takes less than 2 seconds.   Neurological:      General: No focal deficit present.      Mental Status: She is alert and oriented to person, place, and time. Mental status is at baseline.      GCS: GCS eye subscore is 4. GCS verbal subscore is 5. GCS motor subscore is 6.      Cranial Nerves: Cranial nerves are intact. No facial asymmetry.      Sensory: Sensation is intact.      Motor: Motor function is intact.      Coordination: Coordination is intact.      Gait: Gait is intact.      Comments: Tongue midline   No hoarseness, voice quality intact   Psychiatric:         Mood and Affect: Mood normal.         Behavior: Behavior normal.         Imaging/Labs:    CTA Neck w/wo contrast performed at [facility] 4/27/2022:  Impression: Bilateral hemodynamically significant stenosis of internal carotid arteries estimated at 90% on the right and 60 to 70% on the left.        Assessment / Plan      Assessment / Plan:  Diagnoses and all orders  for this visit:    1. Carotid stenosis, asymptomatic, bilateral (Primary)    2. Stenosis of right carotid artery s/p Rt CEA 5/17/22 by Dr. Finn       1. Bilateral carotid artery stenosis s/p right carotid endarterectomy on 5/17/2022 by Dr. Finn: Patient is recovering well and denies any new complaints since discharge from surgery. Patient denies any new TIA/CVA symptoms or vision changes. Patient has no tongue deviation, asymmetrical smile, hoarseness or voice quality change, or evidence of Arun syndrome. Patient's CEA incision is completely healed. Patient is ready to proceed with left carotid endarterectomy for 70% left ICA stenosis with Dr. Finn. Risks of surgery were discussed with the patient including: bleeding, infection, blood clots, loss of limb, kidney damage, stroke, heart attack, or death.  Patient understands risks and agrees to proceed. Will proceed with scheduling with Dr. Finn.     Patient Education:  Cassy Queencutt  reports that she has been smoking cigarettes. She has a 75.00 pack-year smoking history. She has never used smokeless tobacco.. I have educated her on the risk of diseases from using tobacco products such as cancer, COPD and heart disease.     I advised her to quit and she is not willing to quit.    I spent 5 minutes counseling the patient.         Follow Up:   No follow-ups on file.   Or sooner for any further concerns or worsening sign and symptoms. If unable to reach us in the office please dial 911 or go to the nearest emergency department.      Sanna SOSA  [facility] Cardiothoracic Surgery

## 2022-06-28 DIAGNOSIS — I65.22 STENOSIS OF LEFT CAROTID ARTERY: Primary | ICD-10-CM

## 2022-07-21 ENCOUNTER — OFFICE VISIT (OUTPATIENT)
Dept: CARDIOLOGY | Facility: CLINIC | Age: 77
End: 2022-07-21

## 2022-07-21 VITALS
WEIGHT: 177.6 LBS | SYSTOLIC BLOOD PRESSURE: 104 MMHG | HEIGHT: 64 IN | DIASTOLIC BLOOD PRESSURE: 60 MMHG | OXYGEN SATURATION: 100 % | HEART RATE: 61 BPM | BODY MASS INDEX: 30.32 KG/M2

## 2022-07-21 DIAGNOSIS — I73.9 ASYMPTOMATIC PVD (PERIPHERAL VASCULAR DISEASE): ICD-10-CM

## 2022-07-21 DIAGNOSIS — R06.02 SHORTNESS OF BREATH: ICD-10-CM

## 2022-07-21 DIAGNOSIS — F17.200 SMOKING: ICD-10-CM

## 2022-07-21 DIAGNOSIS — E78.2 MIXED HYPERLIPIDEMIA: ICD-10-CM

## 2022-07-21 DIAGNOSIS — R42 LIGHTHEADED: ICD-10-CM

## 2022-07-21 DIAGNOSIS — R60.9 PERIPHERAL EDEMA: ICD-10-CM

## 2022-07-21 DIAGNOSIS — I65.23 CAROTID STENOSIS, ASYMPTOMATIC, BILATERAL: ICD-10-CM

## 2022-07-21 DIAGNOSIS — I25.119 CORONARY ARTERY DISEASE INVOLVING NATIVE CORONARY ARTERY OF NATIVE HEART WITH ANGINA PECTORIS: Primary | ICD-10-CM

## 2022-07-21 DIAGNOSIS — I10 ESSENTIAL HYPERTENSION: ICD-10-CM

## 2022-07-21 DIAGNOSIS — I51.89 GRADE I DIASTOLIC DYSFUNCTION: ICD-10-CM

## 2022-07-21 PROCEDURE — 99214 OFFICE O/P EST MOD 30 MIN: CPT | Performed by: NURSE PRACTITIONER

## 2022-07-21 NOTE — PROGRESS NOTES
"Subjective   Cassy SARA Olvera is a 76 y.o. female     Chief Complaint   Patient presents with   • Coronary Artery Disease   • Hypertension   • Hyperlipidemia       HPI    PROBLEM LIST:     1. CAD.  1.1 Cardiac Cath. \"Failed\" PCI of the RCA. [facility]. Winter 2014- Intraprocedural closure of vessel. ILUMIEN-1 stent mid-LAD, 01/20/2014. [place], [place].  1.2 Nuclear stress test 8-7-18 with lateral wall ischemia  1.3 left heart catheter 11/16/18-2 stents to the right coronary artery, 30-50% ostial narrowing of the circumflex, EF 50-55%, left ventricular end of blood pressure 14  2. Shortness of Breath.  2.1 Echo 8-7-18 with Ef > 65%, mild DD, trace MR, trace TR, pulm. Pressures 30-35 mmHg, no pericardial effusion  2.2 Echo 2/16/2022-EF 60± percent, mild LVH, diastolic dysfunction 1, trivial MR and trivial to mild TR, PA in the high 30s  3. Hyperlipidemia.   4.  Smoking habituation  5.  PVD  5.1 bilateral lower extremity arterial ultrasound 2/4/2020-diffuse arthrosis in both common femoral arteries without obstruction, stimulate mild atherosclerotic involvement of the superficial femoral arteries without stenosis, popliteal arteries are widely patent without aneurysmal dilation, distal vessels are patent, nonobstructive disease  6.  COPD, Dr. Goodrich  7.  Carotid artery disease with right carotid endarterectomy 5/17/2022, Dr. Finn  7.1 CT of the neck 4/27/2022-right carotid artery system had estimated 90% stenosis, left carotid system has 60 to 70% stenosis, fairly extensive calcified plaque in both carotid bifurcations, bilateral intergroup vertebral flow, fairly advanced emphysema    Patient is a 76-year-old female who presents today for follow-up.  Patient is status post right carotid endarterectomy with Dr. Finn back in May.  She is supposed be having her left carotid done August 4.  Patient says on occasion she will get a sharp pain left anterior that comes and goes.  She says it is not often and she does " not use nitroglycerin.  She says it is not anything that she is actually concerned with.  She denies any palpitations, fluttering, dizziness, presyncope, syncope, orthopnea or PND.  She says she does get lightheaded on occasion if she stands too quickly.  She says she was having some swelling but it is much better than it was.  She says she has shortness of breath but she has for her age is and this has not changed.    I discussed with patient the decreased pedal pulses but at this time she does not want any additional testing.  We discussed her chest pain and again she is not wanting any additional testing at this time.  She says she has been going back and forth between us and Dr. Goodrich and she is just tired of going to the doctors.  Her last LDL was 24 and triglycerides were 64.    We went over echo and CT of the carotids.    Current Outpatient Medications on File Prior to Visit   Medication Sig Dispense Refill   • amLODIPine (NORVASC) 5 MG tablet Take 1 tablet by mouth Daily. 90 tablet 3   • aspirin 81 MG tablet Take 1 tablet by mouth Daily. 90 tablet 3   • clopidogrel (Plavix) 75 MG tablet Take 1 tablet by mouth Daily. 30 tablet 11   • Coenzyme Q10 (Co Q-10) 100 MG capsule Take 100 mg by mouth 2 (Two) Times a Day. 180 capsule 3   • furosemide (LASIX) 20 MG tablet Take 1 tablet by mouth Daily. 90 tablet 3   • hydroCHLOROthiazide (MICROZIDE) 12.5 MG capsule Take 1 capsule by mouth Daily As Needed (edema). 30 capsule 11   • lisinopril (PRINIVIL,ZESTRIL) 40 MG tablet Take 1 tablet by mouth Daily. 90 tablet 3   • nitroglycerin (NITROSTAT) 0.4 MG SL tablet Place 1 tablet under the tongue Every 5 (Five) Minutes As Needed for Chest Pain. Take no more than 3 doses in 15 minutes. 25 tablet 6   • rosuvastatin (CRESTOR) 20 MG tablet Take 1 tablet by mouth Daily. 90 tablet 3     Current Facility-Administered Medications on File Prior to Visit   Medication Dose Route Frequency Provider Last Rate Last Admin   • [DISCONTINUED]  Chlorhexidine Gluconate Cloth 2 % pads 1 application  1 application Topical Q12H PRN Ramandeep Chavez PA-C           ALLERGIES    Patient has no known allergies.    Past Medical History:   Diagnosis Date   • Carotid artery disease (HCC)    • COPD (chronic obstructive pulmonary disease) (HCC)    • Coronary artery disease    • COVID-19 vaccine administered     02/26/2021 , 03/26/2021 . 11/04/2021 - Moderna   • Hyperlipidemia    • Hypertension    • On home oxygen therapy     uses PRN and at HS       Social History     Socioeconomic History   • Marital status:    • Number of children: 2   Tobacco Use   • Smoking status: Current Every Day Smoker     Packs/day: 1.50     Years: 50.00     Pack years: 75.00     Types: Cigarettes   • Smokeless tobacco: Never Used   Vaping Use   • Vaping Use: Never used   Substance and Sexual Activity   • Alcohol use: No   • Drug use: No   • Sexual activity: Defer       Family History   Problem Relation Age of Onset   • Hypertension Mother    • Heart disease Mother    • Diabetes Mother    • Heart attack Father        Review of Systems   Constitutional: Negative for activity change, appetite change, chills, fatigue and fever.   HENT: Negative for congestion, sinus pressure and sinus pain.    Eyes: Negative for visual disturbance.   Respiratory: Positive for apnea (Oxygen), cough (Smoking), shortness of breath (always for ages ) and wheezing. Negative for chest tightness.    Cardiovascular: Positive for chest pain (left anterior sharp pain, comes and go, not often, no nitro ) and leg swelling (much better ). Negative for palpitations.   Gastrointestinal: Negative for abdominal pain, blood in stool, constipation, nausea and vomiting.   Endocrine: Negative for cold intolerance and heat intolerance.   Genitourinary: Negative for difficulty urinating, frequency, hematuria and urgency.   Musculoskeletal: Positive for back pain (Lower back pain/middle). Negative for arthralgias, gait problem,  "joint swelling, neck pain and neck stiffness.   Skin: Positive for color change (Left breast- missed a step, and hit a table). Negative for rash and wound.   Neurological: Positive for light-headedness (W/ standing too quickly). Negative for dizziness, syncope, weakness and headaches.   Hematological: Bruises/bleeds easily.   Psychiatric/Behavioral: Negative for sleep disturbance.       Objective   /60 (BP Location: Right arm, Patient Position: Sitting, Cuff Size: Adult)   Pulse 61   Ht 162.6 cm (64\")   Wt 80.6 kg (177 lb 9.6 oz)   SpO2 100%   BMI 30.48 kg/m²   Vitals:    07/21/22 0827   BP: 104/60   BP Location: Right arm   Patient Position: Sitting   Cuff Size: Adult   Pulse: 61   SpO2: 100%   Weight: 80.6 kg (177 lb 9.6 oz)   Height: 162.6 cm (64\")      Lab Results (most recent)     None        Physical Exam  Vitals reviewed.   Constitutional:       General: She is awake.      Appearance: Normal appearance. She is well-developed and well-groomed. She is obese.   HENT:      Head: Normocephalic.      Right Ear: Decreased hearing noted.      Left Ear: Decreased hearing noted.   Eyes:      General: Lids are normal.   Neck:      Vascular: No carotid bruit, hepatojugular reflux or JVD.   Cardiovascular:      Rate and Rhythm: Normal rate and regular rhythm.      Pulses:           Radial pulses are 2+ on the right side and 2+ on the left side.      Heart sounds: Normal heart sounds.      Comments: Decreased pedal pulses BLE; PVD changes BLE, mostly on toes  Pulmonary:      Effort: Pulmonary effort is normal.      Breath sounds: Normal air entry. Examination of the right-middle field reveals decreased breath sounds. Examination of the left-middle field reveals decreased breath sounds. Examination of the right-lower field reveals decreased breath sounds. Examination of the left-lower field reveals decreased breath sounds. Decreased breath sounds present.   Abdominal:      General: Bowel sounds are normal.      " Palpations: Abdomen is soft.   Musculoskeletal:      Thoracic back: Tenderness present.      Right lower leg: No edema.      Left lower leg: No edema.   Skin:     General: Skin is warm and dry.      Findings: Bruising (BUE) present.      Comments: Right carotid scar   Neurological:      Mental Status: She is alert and oriented to person, place, and time.   Psychiatric:         Attention and Perception: Attention and perception normal.         Mood and Affect: Mood and affect normal.         Speech: Speech normal.         Behavior: Behavior normal. Behavior is cooperative.         Thought Content: Thought content normal.         Cognition and Memory: Cognition and memory normal.         Judgment: Judgment normal.         Procedure   Procedures         Assessment & Plan      Diagnosis Plan   1. CAD prior RCA stents x2     2. Carotid stenosis, asymptomatic, bilateral     3. Essential hypertension     4. Grade I diastolic dysfunction     5. Mixed hyperlipidemia     6. Asymptomatic PVD (peripheral vascular disease) (HCC)     7. Shortness of breath     8. Peripheral edema     9. Smoking     10. Lightheaded         Return in about 6 months (around 1/21/2023).    CAD-patient is on aspirin, Plavix and statin.  Carotid artery disease-patient is on aspirin, Plavix and statin.  Hypertension-patient is on amlodipine, hydrochlorothiazide and lisinopril.  Diastolic dysfunction/peripheral edema-patient is on Lasix.  Hyperlipidemia-patient is on Crestor and doing very well.  Last LDL was 24.  Asymptomatic PVD-patient is on aspirin, Plavix and statin.  Shortness of breath-stable.  Smoking-encourage cessation.  Light headedness-patient was encouraged to change positions slowly.  She will continue her medication regimen.  She will follow-up in 6 months or sooner if any changes.  Or she should decide she would like to proceed with any additional work-up, including but not limited to stress test.         Cassy Olvera  reports that  she has been smoking cigarettes. She has a 75.00 pack-year smoking history. She has never used smokeless tobacco..    Patient brought in medicine list to appointment, it's been reviewed with patient and med list was updated in the chart.     Advance Care Planning   ACP discussion was declined by the patient. Patient does not have an advance directive, declines further assistance.                Electronically signed by:

## 2022-07-27 ENCOUNTER — PREP FOR SURGERY (OUTPATIENT)
Dept: OTHER | Facility: HOSPITAL | Age: 77
End: 2022-07-27

## 2022-07-27 DIAGNOSIS — I65.23 CAROTID STENOSIS, ASYMPTOMATIC, BILATERAL: Primary | ICD-10-CM

## 2022-07-28 RX ORDER — CHLORHEXIDINE GLUCONATE 500 MG/1
1 CLOTH TOPICAL EVERY 12 HOURS PRN
Status: CANCELLED | OUTPATIENT
Start: 2022-08-03

## 2022-08-03 ENCOUNTER — ANESTHESIA EVENT (OUTPATIENT)
Dept: PERIOP | Facility: HOSPITAL | Age: 77
End: 2022-08-03

## 2022-08-03 ENCOUNTER — HOSPITAL ENCOUNTER (OUTPATIENT)
Dept: GENERAL RADIOLOGY | Facility: HOSPITAL | Age: 77
Discharge: HOME | End: 2022-08-03

## 2022-08-03 ENCOUNTER — PRE-ADMISSION TESTING (OUTPATIENT)
Dept: PREADMISSION TESTING | Facility: HOSPITAL | Age: 77
End: 2022-08-03

## 2022-08-03 VITALS — HEIGHT: 64 IN | WEIGHT: 175.27 LBS | BODY MASS INDEX: 29.92 KG/M2

## 2022-08-03 DIAGNOSIS — I65.23 CAROTID STENOSIS, ASYMPTOMATIC, BILATERAL: ICD-10-CM

## 2022-08-03 LAB
ABO GROUP BLD: NORMAL
AMPHET+METHAMPHET UR QL: NEGATIVE
AMPHETAMINES UR QL: NEGATIVE
ANION GAP SERPL CALCULATED.3IONS-SCNC: 6 MMOL/L (ref 5–15)
BARBITURATES UR QL SCN: NEGATIVE
BENZODIAZ UR QL SCN: NEGATIVE
BLD GP AB SCN SERPL QL: NEGATIVE
BUN SERPL-MCNC: 9 MG/DL (ref 8–23)
BUN/CREAT SERPL: 13.2 (ref 7–25)
BUPRENORPHINE SERPL-MCNC: NEGATIVE NG/ML
CALCIUM SPEC-SCNC: 9.9 MG/DL (ref 8.6–10.5)
CANNABINOIDS SERPL QL: NEGATIVE
CHLORIDE SERPL-SCNC: 94 MMOL/L (ref 98–107)
CO2 SERPL-SCNC: 37 MMOL/L (ref 22–29)
COCAINE UR QL: NEGATIVE
CREAT SERPL-MCNC: 0.68 MG/DL (ref 0.57–1)
DEPRECATED RDW RBC AUTO: 42.2 FL (ref 37–54)
EGFRCR SERPLBLD CKD-EPI 2021: 90.4 ML/MIN/1.73
ERYTHROCYTE [DISTWIDTH] IN BLOOD BY AUTOMATED COUNT: 12.2 % (ref 12.3–15.4)
GLUCOSE SERPL-MCNC: 79 MG/DL (ref 65–99)
HBA1C MFR BLD: 5.8 % (ref 4.8–5.6)
HCT VFR BLD AUTO: 42.8 % (ref 34–46.6)
HGB BLD-MCNC: 14.6 G/DL (ref 12–15.9)
INR PPP: 0.91 (ref 0.84–1.13)
MCH RBC QN AUTO: 31.7 PG (ref 26.6–33)
MCHC RBC AUTO-ENTMCNC: 34.1 G/DL (ref 31.5–35.7)
MCV RBC AUTO: 93 FL (ref 79–97)
METHADONE UR QL SCN: NEGATIVE
OPIATES UR QL: NEGATIVE
OXYCODONE UR QL SCN: NEGATIVE
PCP UR QL SCN: NEGATIVE
PLATELET # BLD AUTO: 317 10*3/MM3 (ref 140–450)
PMV BLD AUTO: 9.7 FL (ref 6–12)
POTASSIUM SERPL-SCNC: 4.2 MMOL/L (ref 3.5–5.2)
PROPOXYPH UR QL: NEGATIVE
PROTHROMBIN TIME: 12.1 SECONDS (ref 11.4–14.4)
RBC # BLD AUTO: 4.6 10*6/MM3 (ref 3.77–5.28)
RH BLD: POSITIVE
SARS-COV-2 RNA PNL SPEC NAA+PROBE: NOT DETECTED
SODIUM SERPL-SCNC: 137 MMOL/L (ref 136–145)
T&S EXPIRATION DATE: NORMAL
TRICYCLICS UR QL SCN: NEGATIVE
WBC NRBC COR # BLD: 10.56 10*3/MM3 (ref 3.4–10.8)

## 2022-08-03 PROCEDURE — 85610 PROTHROMBIN TIME: CPT

## 2022-08-03 PROCEDURE — 86901 BLOOD TYPING SEROLOGIC RH(D): CPT

## 2022-08-03 PROCEDURE — 86850 RBC ANTIBODY SCREEN: CPT

## 2022-08-03 PROCEDURE — 80048 BASIC METABOLIC PNL TOTAL CA: CPT

## 2022-08-03 PROCEDURE — U0004 COV-19 TEST NON-CDC HGH THRU: HCPCS

## 2022-08-03 PROCEDURE — 36415 COLL VENOUS BLD VENIPUNCTURE: CPT

## 2022-08-03 PROCEDURE — 80306 DRUG TEST PRSMV INSTRMNT: CPT | Performed by: PHYSICIAN ASSISTANT

## 2022-08-03 PROCEDURE — 83036 HEMOGLOBIN GLYCOSYLATED A1C: CPT

## 2022-08-03 PROCEDURE — 80305 DRUG TEST PRSMV DIR OPT OBS: CPT | Performed by: PHYSICIAN ASSISTANT

## 2022-08-03 PROCEDURE — U0005 INFEC AGEN DETEC AMPLI PROBE: HCPCS

## 2022-08-03 PROCEDURE — 85027 COMPLETE CBC AUTOMATED: CPT

## 2022-08-03 PROCEDURE — 86900 BLOOD TYPING SEROLOGIC ABO: CPT

## 2022-08-03 PROCEDURE — C9803 HOPD COVID-19 SPEC COLLECT: HCPCS

## 2022-08-03 PROCEDURE — 71046 X-RAY EXAM CHEST 2 VIEWS: CPT

## 2022-08-03 RX ORDER — FAMOTIDINE 10 MG/ML
20 INJECTION, SOLUTION INTRAVENOUS ONCE
Status: CANCELLED | OUTPATIENT
Start: 2022-08-03 | End: 2022-08-03

## 2022-08-03 NOTE — PAT
An arrival time for procedure was not given during PAT visit. If patient had any questions or concerns about their arrival time, they were instructed to contact their surgeon/physician.  Additionally, if the patient referred to an arrival time that was acquired from their my chart account, patient was encouraged to verify that time with their surgeon/physician.  NO arrival times given in Pre Admission Testing Department.    Patient to apply Chlorhexadine wipes  to surgical area (as instructed) the night before procedure and the AM of procedure. Wipes provided.    Patient denies any current skin issues.     Patient viewed general PAT education video as instructed in their preoperative information received from their surgeon.  Patient stated the general PAT education video was viewed in its entirety and survey completed.  Copies of PAT general education handouts (Incentive Spirometry, Meds to Beds Program, Patient Belongings, Pre-op skin preparation instructions, Blood Glucose testing, Visitor policy, Surgery FAQ, Code H) distributed to patient if not printed. Education related to the PAT pass and skin preparation for surgery (if applicable) completed in PAT as a reinforcement to PAT education video. Patient instructed to return PAT pass provided today as well as completed skin preparation sheet (if applicable) on the day of procedure.     Additionally if patient had not viewed video yet but intended to view it at home or in our waiting area, then referred them to the handout with QR code/link provided during PAT visit.  Instructed patient to complete survey after viewing the video in its entirety.  Encouraged patient/family to read PAT general education handouts thoroughly and notify PAT staff with any questions or concerns. Patient verbalized understanding of all information and priority content.    Per Anesthesia Request, patient instructed not to take their ACE/ARB medications on the AM of surgery.    Blood bank  bracelet applied to patient during Pre Admission Testing visit.  Patient instructed not to remove from arm until after procedure and they are discharged from the hospital.  Explained to patient that they may shower and get the bracelet wet, but not to immerse under water for longer periods (bathing, swimming, hand dishwashing, etc).  Patient verbalized understanding.    Patient directed to Radiology Department for CXR after Pre Admission Testing Appointment.

## 2022-08-04 ENCOUNTER — ANESTHESIA (OUTPATIENT)
Dept: PERIOP | Facility: HOSPITAL | Age: 77
End: 2022-08-04

## 2022-08-04 ENCOUNTER — APPOINTMENT (OUTPATIENT)
Dept: NEUROLOGY | Facility: HOSPITAL | Age: 77
End: 2022-08-04

## 2022-08-04 ENCOUNTER — HOSPITAL ENCOUNTER (INPATIENT)
Facility: HOSPITAL | Age: 77
LOS: 1 days | Discharge: LEFT BEFORE DISCHARGE | End: 2022-08-05
Attending: THORACIC SURGERY (CARDIOTHORACIC VASCULAR SURGERY) | Admitting: THORACIC SURGERY (CARDIOTHORACIC VASCULAR SURGERY)

## 2022-08-04 ENCOUNTER — ANESTHESIA EVENT CONVERTED (OUTPATIENT)
Dept: ANESTHESIOLOGY | Facility: HOSPITAL | Age: 77
End: 2022-08-04

## 2022-08-04 DIAGNOSIS — I65.23 CAROTID STENOSIS, ASYMPTOMATIC, BILATERAL: ICD-10-CM

## 2022-08-04 DIAGNOSIS — I65.22 STENOSIS OF LEFT CAROTID ARTERY: ICD-10-CM

## 2022-08-04 PROBLEM — E66.9 CLASS 1 OBESITY IN ADULT: Chronic | Status: ACTIVE | Noted: 2022-08-04

## 2022-08-04 PROBLEM — E78.2 MIXED HYPERLIPIDEMIA: Chronic | Status: ACTIVE | Noted: 2018-07-12

## 2022-08-04 PROBLEM — I65.21 STENOSIS OF RIGHT CAROTID ARTERY: Chronic | Status: ACTIVE | Noted: 2022-05-17

## 2022-08-04 PROBLEM — E66.811 CLASS 1 OBESITY IN ADULT: Chronic | Status: ACTIVE | Noted: 2022-08-04

## 2022-08-04 PROBLEM — F17.200 SMOKING: Chronic | Status: ACTIVE | Noted: 2018-10-19

## 2022-08-04 PROBLEM — IMO0001 SMOKING: Chronic | Status: ACTIVE | Noted: 2018-10-19

## 2022-08-04 PROBLEM — E66.811 CLASS 1 OBESITY IN ADULT: Status: ACTIVE | Noted: 2022-08-04

## 2022-08-04 PROBLEM — I10 HYPERTENSION: Chronic | Status: ACTIVE | Noted: 2018-08-09

## 2022-08-04 PROBLEM — E66.9 CLASS 1 OBESITY IN ADULT: Status: ACTIVE | Noted: 2022-08-04

## 2022-08-04 PROBLEM — I25.119 CORONARY ARTERY DISEASE INVOLVING NATIVE CORONARY ARTERY OF NATIVE HEART WITH ANGINA PECTORIS: Chronic | Status: ACTIVE | Noted: 2018-07-12

## 2022-08-04 LAB
COTININE UR QL SCN: POSITIVE NG/ML
Lab: ABNORMAL

## 2022-08-04 PROCEDURE — 0 CEFUROXIME SODIUM 1.5 G RECONSTITUTED SOLUTION: Performed by: PHYSICIAN ASSISTANT

## 2022-08-04 PROCEDURE — 25010000002 PROPOFOL 10 MG/ML EMULSION: Performed by: NURSE ANESTHETIST, CERTIFIED REGISTERED

## 2022-08-04 PROCEDURE — 94799 UNLISTED PULMONARY SVC/PX: CPT

## 2022-08-04 PROCEDURE — C1768 GRAFT, VASCULAR: HCPCS | Performed by: THORACIC SURGERY (CARDIOTHORACIC VASCULAR SURGERY)

## 2022-08-04 PROCEDURE — 25010000002 PROTAMINE SULFATE PER 10 MG: Performed by: NURSE ANESTHETIST, CERTIFIED REGISTERED

## 2022-08-04 PROCEDURE — 25010000002 GENTAMICIN PER 80 MG: Performed by: THORACIC SURGERY (CARDIOTHORACIC VASCULAR SURGERY)

## 2022-08-04 PROCEDURE — 25010000002 ONDANSETRON PER 1 MG: Performed by: NURSE ANESTHETIST, CERTIFIED REGISTERED

## 2022-08-04 PROCEDURE — 03UJ0KZ SUPPLEMENT LEFT COMMON CAROTID ARTERY WITH NONAUTOLOGOUS TISSUE SUBSTITUTE, OPEN APPROACH: ICD-10-PCS | Performed by: THORACIC SURGERY (CARDIOTHORACIC VASCULAR SURGERY)

## 2022-08-04 PROCEDURE — S0260 H&P FOR SURGERY: HCPCS | Performed by: THORACIC SURGERY (CARDIOTHORACIC VASCULAR SURGERY)

## 2022-08-04 PROCEDURE — 25010000002 DEXAMETHASONE PER 1 MG: Performed by: NURSE ANESTHETIST, CERTIFIED REGISTERED

## 2022-08-04 PROCEDURE — 4A10X4Z MONITORING OF CENTRAL NERVOUS ELECTRICAL ACTIVITY, EXTERNAL APPROACH: ICD-10-PCS | Performed by: THORACIC SURGERY (CARDIOTHORACIC VASCULAR SURGERY)

## 2022-08-04 PROCEDURE — 88311 DECALCIFY TISSUE: CPT | Performed by: THORACIC SURGERY (CARDIOTHORACIC VASCULAR SURGERY)

## 2022-08-04 PROCEDURE — 95955 EEG DURING SURGERY: CPT

## 2022-08-04 PROCEDURE — 25010000002 PHENYLEPHRINE 10 MG/ML SOLUTION 1 ML VIAL: Performed by: NURSE ANESTHETIST, CERTIFIED REGISTERED

## 2022-08-04 PROCEDURE — 25010000002 FENTANYL CITRATE (PF) 50 MCG/ML SOLUTION: Performed by: NURSE ANESTHETIST, CERTIFIED REGISTERED

## 2022-08-04 PROCEDURE — 03CJ0ZZ EXTIRPATION OF MATTER FROM LEFT COMMON CAROTID ARTERY, OPEN APPROACH: ICD-10-PCS | Performed by: THORACIC SURGERY (CARDIOTHORACIC VASCULAR SURGERY)

## 2022-08-04 PROCEDURE — 88304 TISSUE EXAM BY PATHOLOGIST: CPT | Performed by: THORACIC SURGERY (CARDIOTHORACIC VASCULAR SURGERY)

## 2022-08-04 PROCEDURE — 0 LIDOCAINE 1 % SOLUTION: Performed by: THORACIC SURGERY (CARDIOTHORACIC VASCULAR SURGERY)

## 2022-08-04 PROCEDURE — S0260 H&P FOR SURGERY: HCPCS | Performed by: PHYSICIAN ASSISTANT

## 2022-08-04 PROCEDURE — 0 CEFUROXIME SODIUM 1.5 G RECONSTITUTED SOLUTION

## 2022-08-04 PROCEDURE — 94640 AIRWAY INHALATION TREATMENT: CPT

## 2022-08-04 PROCEDURE — 25010000002 HEPARIN (PORCINE) PER 1000 UNITS: Performed by: THORACIC SURGERY (CARDIOTHORACIC VASCULAR SURGERY)

## 2022-08-04 PROCEDURE — 25010000002 HYDROMORPHONE 1 MG/ML SOLUTION

## 2022-08-04 PROCEDURE — 25010000002 FENTANYL CITRATE (PF) 50 MCG/ML SOLUTION

## 2022-08-04 PROCEDURE — 99222 1ST HOSP IP/OBS MODERATE 55: CPT | Performed by: INTERNAL MEDICINE

## 2022-08-04 PROCEDURE — 25010000002 CEFAZOLIN PER 500 MG: Performed by: THORACIC SURGERY (CARDIOTHORACIC VASCULAR SURGERY)

## 2022-08-04 PROCEDURE — 35301 RECHANNELING OF ARTERY: CPT | Performed by: THORACIC SURGERY (CARDIOTHORACIC VASCULAR SURGERY)

## 2022-08-04 PROCEDURE — 25010000002 HEPARIN (PORCINE) PER 1000 UNITS: Performed by: NURSE ANESTHETIST, CERTIFIED REGISTERED

## 2022-08-04 PROCEDURE — 35301 RECHANNELING OF ARTERY: CPT | Performed by: PHYSICIAN ASSISTANT

## 2022-08-04 PROCEDURE — 94664 DEMO&/EVAL PT USE INHALER: CPT

## 2022-08-04 DEVICE — SEALANT HEMO TACHOSIL FIBRIN PTCH 9.5X4.8CM: Type: IMPLANTABLE DEVICE | Site: NECK | Status: FUNCTIONAL

## 2022-08-04 RX ORDER — SODIUM CHLORIDE 0.9 % (FLUSH) 0.9 %
3-10 SYRINGE (ML) INJECTION AS NEEDED
Status: DISCONTINUED | OUTPATIENT
Start: 2022-08-04 | End: 2022-08-04 | Stop reason: HOSPADM

## 2022-08-04 RX ORDER — AMLODIPINE BESYLATE 5 MG/1
5 TABLET ORAL DAILY
Status: DISCONTINUED | OUTPATIENT
Start: 2022-08-04 | End: 2022-08-05 | Stop reason: HOSPADM

## 2022-08-04 RX ORDER — ROSUVASTATIN CALCIUM 20 MG/1
20 TABLET, COATED ORAL DAILY
Status: DISCONTINUED | OUTPATIENT
Start: 2022-08-04 | End: 2022-08-05 | Stop reason: HOSPADM

## 2022-08-04 RX ORDER — ONDANSETRON 2 MG/ML
4 INJECTION INTRAMUSCULAR; INTRAVENOUS EVERY 6 HOURS PRN
Status: DISCONTINUED | OUTPATIENT
Start: 2022-08-04 | End: 2022-08-05 | Stop reason: HOSPADM

## 2022-08-04 RX ORDER — HYDROCODONE BITARTRATE AND ACETAMINOPHEN 7.5; 325 MG/1; MG/1
1 TABLET ORAL EVERY 6 HOURS PRN
Status: DISCONTINUED | OUTPATIENT
Start: 2022-08-04 | End: 2022-08-05 | Stop reason: HOSPADM

## 2022-08-04 RX ORDER — AMOXICILLIN 250 MG
2 CAPSULE ORAL 2 TIMES DAILY PRN
Status: DISCONTINUED | OUTPATIENT
Start: 2022-08-04 | End: 2022-08-05 | Stop reason: HOSPADM

## 2022-08-04 RX ORDER — SODIUM CHLORIDE 9 MG/ML
INJECTION, SOLUTION INTRAVENOUS AS NEEDED
Status: DISCONTINUED | OUTPATIENT
Start: 2022-08-04 | End: 2022-08-04 | Stop reason: HOSPADM

## 2022-08-04 RX ORDER — ONDANSETRON 2 MG/ML
INJECTION INTRAMUSCULAR; INTRAVENOUS AS NEEDED
Status: DISCONTINUED | OUTPATIENT
Start: 2022-08-04 | End: 2022-08-04 | Stop reason: SURG

## 2022-08-04 RX ORDER — MIDAZOLAM HYDROCHLORIDE 1 MG/ML
0.5 INJECTION INTRAMUSCULAR; INTRAVENOUS
Status: DISCONTINUED | OUTPATIENT
Start: 2022-08-04 | End: 2022-08-04 | Stop reason: HOSPADM

## 2022-08-04 RX ORDER — BUPIVACAINE HCL/0.9 % NACL/PF 0.125 %
PLASTIC BAG, INJECTION (ML) EPIDURAL AS NEEDED
Status: DISCONTINUED | OUTPATIENT
Start: 2022-08-04 | End: 2022-08-04 | Stop reason: SURG

## 2022-08-04 RX ORDER — HYDRALAZINE HYDROCHLORIDE 20 MG/ML
5 INJECTION INTRAMUSCULAR; INTRAVENOUS
Status: DISCONTINUED | OUTPATIENT
Start: 2022-08-04 | End: 2022-08-04 | Stop reason: HOSPADM

## 2022-08-04 RX ORDER — CLOPIDOGREL BISULFATE 75 MG/1
75 TABLET ORAL DAILY
Status: DISCONTINUED | OUTPATIENT
Start: 2022-08-04 | End: 2022-08-05 | Stop reason: HOSPADM

## 2022-08-04 RX ORDER — ONDANSETRON 2 MG/ML
4 INJECTION INTRAMUSCULAR; INTRAVENOUS ONCE AS NEEDED
Status: DISCONTINUED | OUTPATIENT
Start: 2022-08-04 | End: 2022-08-04 | Stop reason: HOSPADM

## 2022-08-04 RX ORDER — FENTANYL CITRATE 50 UG/ML
INJECTION, SOLUTION INTRAMUSCULAR; INTRAVENOUS
Status: COMPLETED
Start: 2022-08-04 | End: 2022-08-04

## 2022-08-04 RX ORDER — EPHEDRINE SULFATE 50 MG/ML
INJECTION, SOLUTION INTRAVENOUS AS NEEDED
Status: DISCONTINUED | OUTPATIENT
Start: 2022-08-04 | End: 2022-08-04 | Stop reason: SURG

## 2022-08-04 RX ORDER — NITROGLYCERIN 0.4 MG/1
0.4 TABLET SUBLINGUAL
Status: DISCONTINUED | OUTPATIENT
Start: 2022-08-04 | End: 2022-08-05 | Stop reason: HOSPADM

## 2022-08-04 RX ORDER — FENTANYL CITRATE 50 UG/ML
50 INJECTION, SOLUTION INTRAMUSCULAR; INTRAVENOUS
Status: COMPLETED | OUTPATIENT
Start: 2022-08-04 | End: 2022-08-04

## 2022-08-04 RX ORDER — LIDOCAINE HYDROCHLORIDE 10 MG/ML
0.5 INJECTION, SOLUTION EPIDURAL; INFILTRATION; INTRACAUDAL; PERINEURAL ONCE AS NEEDED
Status: COMPLETED | OUTPATIENT
Start: 2022-08-04 | End: 2022-08-04

## 2022-08-04 RX ORDER — BISACODYL 10 MG
10 SUPPOSITORY, RECTAL RECTAL DAILY PRN
Status: DISCONTINUED | OUTPATIENT
Start: 2022-08-04 | End: 2022-08-05 | Stop reason: HOSPADM

## 2022-08-04 RX ORDER — NITROGLYCERIN 20 MG/100ML
INJECTION INTRAVENOUS
Status: COMPLETED
Start: 2022-08-04 | End: 2022-08-04

## 2022-08-04 RX ORDER — IPRATROPIUM BROMIDE AND ALBUTEROL SULFATE 2.5; .5 MG/3ML; MG/3ML
3 SOLUTION RESPIRATORY (INHALATION) EVERY 4 HOURS PRN
Status: DISCONTINUED | OUTPATIENT
Start: 2022-08-04 | End: 2022-08-05 | Stop reason: HOSPADM

## 2022-08-04 RX ORDER — LISINOPRIL 40 MG/1
40 TABLET ORAL DAILY
Status: DISCONTINUED | OUTPATIENT
Start: 2022-08-04 | End: 2022-08-05 | Stop reason: HOSPADM

## 2022-08-04 RX ORDER — PROPOFOL 10 MG/ML
VIAL (ML) INTRAVENOUS AS NEEDED
Status: DISCONTINUED | OUTPATIENT
Start: 2022-08-04 | End: 2022-08-04 | Stop reason: SURG

## 2022-08-04 RX ORDER — FENTANYL CITRATE 50 UG/ML
INJECTION, SOLUTION INTRAMUSCULAR; INTRAVENOUS AS NEEDED
Status: DISCONTINUED | OUTPATIENT
Start: 2022-08-04 | End: 2022-08-04 | Stop reason: SURG

## 2022-08-04 RX ORDER — NICARDIPINE HYDROCHLORIDE 2.5 MG/ML
INJECTION INTRAVENOUS CONTINUOUS PRN
Status: DISCONTINUED | OUTPATIENT
Start: 2022-08-04 | End: 2022-08-04 | Stop reason: SURG

## 2022-08-04 RX ORDER — IPRATROPIUM BROMIDE AND ALBUTEROL SULFATE 2.5; .5 MG/3ML; MG/3ML
3 SOLUTION RESPIRATORY (INHALATION)
Status: DISCONTINUED | OUTPATIENT
Start: 2022-08-04 | End: 2022-08-04 | Stop reason: HOSPADM

## 2022-08-04 RX ORDER — FUROSEMIDE 20 MG/1
20 TABLET ORAL DAILY
Status: DISCONTINUED | OUTPATIENT
Start: 2022-08-04 | End: 2022-08-05 | Stop reason: HOSPADM

## 2022-08-04 RX ORDER — DROPERIDOL 2.5 MG/ML
0.62 INJECTION, SOLUTION INTRAMUSCULAR; INTRAVENOUS ONCE AS NEEDED
Status: DISCONTINUED | OUTPATIENT
Start: 2022-08-04 | End: 2022-08-04 | Stop reason: HOSPADM

## 2022-08-04 RX ORDER — PROTAMINE SULFATE 10 MG/ML
INJECTION, SOLUTION INTRAVENOUS AS NEEDED
Status: DISCONTINUED | OUTPATIENT
Start: 2022-08-04 | End: 2022-08-04 | Stop reason: SURG

## 2022-08-04 RX ORDER — LIDOCAINE HYDROCHLORIDE 10 MG/ML
INJECTION, SOLUTION EPIDURAL; INFILTRATION; INTRACAUDAL; PERINEURAL AS NEEDED
Status: DISCONTINUED | OUTPATIENT
Start: 2022-08-04 | End: 2022-08-04 | Stop reason: SURG

## 2022-08-04 RX ORDER — GLYCOPYRROLATE 0.2 MG/ML
INJECTION INTRAMUSCULAR; INTRAVENOUS AS NEEDED
Status: DISCONTINUED | OUTPATIENT
Start: 2022-08-04 | End: 2022-08-04 | Stop reason: SURG

## 2022-08-04 RX ORDER — ONDANSETRON 4 MG/1
4 TABLET, FILM COATED ORAL EVERY 6 HOURS PRN
Status: DISCONTINUED | OUTPATIENT
Start: 2022-08-04 | End: 2022-08-05 | Stop reason: HOSPADM

## 2022-08-04 RX ORDER — NITROGLYCERIN 20 MG/100ML
5-200 INJECTION INTRAVENOUS
Status: DISCONTINUED | OUTPATIENT
Start: 2022-08-04 | End: 2022-08-04

## 2022-08-04 RX ORDER — HYDROMORPHONE HYDROCHLORIDE 1 MG/ML
0.5 INJECTION, SOLUTION INTRAMUSCULAR; INTRAVENOUS; SUBCUTANEOUS
Status: DISCONTINUED | OUTPATIENT
Start: 2022-08-04 | End: 2022-08-04 | Stop reason: HOSPADM

## 2022-08-04 RX ORDER — PROMETHAZINE HYDROCHLORIDE 25 MG/1
25 SUPPOSITORY RECTAL ONCE AS NEEDED
Status: DISCONTINUED | OUTPATIENT
Start: 2022-08-04 | End: 2022-08-04 | Stop reason: HOSPADM

## 2022-08-04 RX ORDER — SODIUM CHLORIDE 0.9 % (FLUSH) 0.9 %
1-10 SYRINGE (ML) INJECTION AS NEEDED
Status: DISCONTINUED | OUTPATIENT
Start: 2022-08-04 | End: 2022-08-05 | Stop reason: HOSPADM

## 2022-08-04 RX ORDER — SODIUM CHLORIDE 0.9 % (FLUSH) 0.9 %
3 SYRINGE (ML) INJECTION EVERY 12 HOURS SCHEDULED
Status: DISCONTINUED | OUTPATIENT
Start: 2022-08-04 | End: 2022-08-04 | Stop reason: HOSPADM

## 2022-08-04 RX ORDER — ROCURONIUM BROMIDE 10 MG/ML
INJECTION, SOLUTION INTRAVENOUS AS NEEDED
Status: DISCONTINUED | OUTPATIENT
Start: 2022-08-04 | End: 2022-08-04 | Stop reason: SURG

## 2022-08-04 RX ORDER — SODIUM CHLORIDE, SODIUM LACTATE, POTASSIUM CHLORIDE, CALCIUM CHLORIDE 600; 310; 30; 20 MG/100ML; MG/100ML; MG/100ML; MG/100ML
9 INJECTION, SOLUTION INTRAVENOUS CONTINUOUS
Status: DISCONTINUED | OUTPATIENT
Start: 2022-08-04 | End: 2022-08-04

## 2022-08-04 RX ORDER — FAMOTIDINE 20 MG/1
20 TABLET, FILM COATED ORAL ONCE
Status: COMPLETED | OUTPATIENT
Start: 2022-08-04 | End: 2022-08-04

## 2022-08-04 RX ORDER — SODIUM CHLORIDE 0.9 % (FLUSH) 0.9 %
10 SYRINGE (ML) INJECTION EVERY 12 HOURS SCHEDULED
Status: DISCONTINUED | OUTPATIENT
Start: 2022-08-04 | End: 2022-08-05 | Stop reason: HOSPADM

## 2022-08-04 RX ORDER — HEPARIN SODIUM 1000 [USP'U]/ML
INJECTION, SOLUTION INTRAVENOUS; SUBCUTANEOUS AS NEEDED
Status: DISCONTINUED | OUTPATIENT
Start: 2022-08-04 | End: 2022-08-04 | Stop reason: SURG

## 2022-08-04 RX ORDER — MEPERIDINE HYDROCHLORIDE 25 MG/ML
12.5 INJECTION INTRAMUSCULAR; INTRAVENOUS; SUBCUTANEOUS
Status: DISCONTINUED | OUTPATIENT
Start: 2022-08-04 | End: 2022-08-04 | Stop reason: HOSPADM

## 2022-08-04 RX ORDER — NALOXONE HCL 0.4 MG/ML
0.4 VIAL (ML) INJECTION AS NEEDED
Status: DISCONTINUED | OUTPATIENT
Start: 2022-08-04 | End: 2022-08-04 | Stop reason: HOSPADM

## 2022-08-04 RX ORDER — PROMETHAZINE HYDROCHLORIDE 25 MG/1
25 TABLET ORAL ONCE AS NEEDED
Status: DISCONTINUED | OUTPATIENT
Start: 2022-08-04 | End: 2022-08-04 | Stop reason: HOSPADM

## 2022-08-04 RX ORDER — SODIUM CHLORIDE 450 MG/100ML
30 INJECTION, SOLUTION INTRAVENOUS CONTINUOUS
Status: DISCONTINUED | OUTPATIENT
Start: 2022-08-04 | End: 2022-08-05 | Stop reason: HOSPADM

## 2022-08-04 RX ORDER — LABETALOL HYDROCHLORIDE 5 MG/ML
5 INJECTION, SOLUTION INTRAVENOUS
Status: DISCONTINUED | OUTPATIENT
Start: 2022-08-04 | End: 2022-08-04 | Stop reason: HOSPADM

## 2022-08-04 RX ORDER — SODIUM CHLORIDE 0.9 % (FLUSH) 0.9 %
10 SYRINGE (ML) INJECTION AS NEEDED
Status: DISCONTINUED | OUTPATIENT
Start: 2022-08-04 | End: 2022-08-04 | Stop reason: HOSPADM

## 2022-08-04 RX ORDER — IPRATROPIUM BROMIDE AND ALBUTEROL SULFATE 2.5; .5 MG/3ML; MG/3ML
3 SOLUTION RESPIRATORY (INHALATION) ONCE AS NEEDED
Status: DISCONTINUED | OUTPATIENT
Start: 2022-08-04 | End: 2022-08-04 | Stop reason: HOSPADM

## 2022-08-04 RX ORDER — ASPIRIN 81 MG/1
81 TABLET ORAL DAILY
Refills: 3 | Status: DISCONTINUED | OUTPATIENT
Start: 2022-08-04 | End: 2022-08-05 | Stop reason: HOSPADM

## 2022-08-04 RX ORDER — MORPHINE SULFATE 2 MG/ML
2 INJECTION, SOLUTION INTRAMUSCULAR; INTRAVENOUS EVERY 4 HOURS PRN
Status: DISCONTINUED | OUTPATIENT
Start: 2022-08-04 | End: 2022-08-05 | Stop reason: HOSPADM

## 2022-08-04 RX ORDER — NICARDIPINE HYDROCHLORIDE 2.5 MG/ML
INJECTION INTRAVENOUS
Status: COMPLETED
Start: 2022-08-04 | End: 2022-08-04

## 2022-08-04 RX ORDER — HYDROCODONE BITARTRATE AND ACETAMINOPHEN 5; 325 MG/1; MG/1
1 TABLET ORAL ONCE AS NEEDED
Status: DISCONTINUED | OUTPATIENT
Start: 2022-08-04 | End: 2022-08-04 | Stop reason: HOSPADM

## 2022-08-04 RX ORDER — NITROGLYCERIN 20 MG/100ML
10-50 INJECTION INTRAVENOUS
Status: DISCONTINUED | OUTPATIENT
Start: 2022-08-04 | End: 2022-08-05 | Stop reason: HOSPADM

## 2022-08-04 RX ORDER — DEXAMETHASONE SODIUM PHOSPHATE 10 MG/ML
INJECTION INTRAMUSCULAR; INTRAVENOUS AS NEEDED
Status: DISCONTINUED | OUTPATIENT
Start: 2022-08-04 | End: 2022-08-04 | Stop reason: SURG

## 2022-08-04 RX ORDER — SODIUM CHLORIDE 0.9 % (FLUSH) 0.9 %
10 SYRINGE (ML) INJECTION EVERY 12 HOURS SCHEDULED
Status: DISCONTINUED | OUTPATIENT
Start: 2022-08-04 | End: 2022-08-04 | Stop reason: HOSPADM

## 2022-08-04 RX ORDER — LIDOCAINE HYDROCHLORIDE 10 MG/ML
INJECTION, SOLUTION INFILTRATION; PERINEURAL AS NEEDED
Status: DISCONTINUED | OUTPATIENT
Start: 2022-08-04 | End: 2022-08-04 | Stop reason: HOSPADM

## 2022-08-04 RX ORDER — ESMOLOL HYDROCHLORIDE 10 MG/ML
INJECTION INTRAVENOUS AS NEEDED
Status: DISCONTINUED | OUTPATIENT
Start: 2022-08-04 | End: 2022-08-04 | Stop reason: SURG

## 2022-08-04 RX ORDER — DROPERIDOL 2.5 MG/ML
0.62 INJECTION, SOLUTION INTRAMUSCULAR; INTRAVENOUS
Status: DISCONTINUED | OUTPATIENT
Start: 2022-08-04 | End: 2022-08-04 | Stop reason: HOSPADM

## 2022-08-04 RX ADMIN — FENTANYL CITRATE 50 MCG: 0.05 INJECTION, SOLUTION INTRAMUSCULAR; INTRAVENOUS at 10:42

## 2022-08-04 RX ADMIN — ROSUVASTATIN 20 MG: 20 TABLET, FILM COATED ORAL at 13:23

## 2022-08-04 RX ADMIN — FENTANYL CITRATE 50 MCG: 0.05 INJECTION, SOLUTION INTRAMUSCULAR; INTRAVENOUS at 09:18

## 2022-08-04 RX ADMIN — NITROGLYCERIN 10 MCG/MIN: 20 INJECTION INTRAVENOUS at 09:00

## 2022-08-04 RX ADMIN — Medication 50 MCG: at 07:52

## 2022-08-04 RX ADMIN — PROTAMINE SULFATE 50 MG: 10 INJECTION, SOLUTION INTRAVENOUS at 07:48

## 2022-08-04 RX ADMIN — SODIUM CHLORIDE, POTASSIUM CHLORIDE, SODIUM LACTATE AND CALCIUM CHLORIDE 9 ML/HR: 600; 310; 30; 20 INJECTION, SOLUTION INTRAVENOUS at 05:55

## 2022-08-04 RX ADMIN — HYDROMORPHONE HYDROCHLORIDE 0.5 MG: 1 INJECTION, SOLUTION INTRAMUSCULAR; INTRAVENOUS; SUBCUTANEOUS at 10:20

## 2022-08-04 RX ADMIN — NICARDIPINE HYDROCHLORIDE 5 MG/HR: 25 INJECTION, SOLUTION INTRAVENOUS at 23:04

## 2022-08-04 RX ADMIN — FENTANYL CITRATE 50 MCG: 50 INJECTION, SOLUTION INTRAMUSCULAR; INTRAVENOUS at 09:52

## 2022-08-04 RX ADMIN — ASPIRIN 81 MG: 81 TABLET, COATED ORAL at 13:23

## 2022-08-04 RX ADMIN — CLOPIDOGREL BISULFATE 75 MG: 75 TABLET ORAL at 13:23

## 2022-08-04 RX ADMIN — NICARDIPINE HYDROCHLORIDE 5 MG/HR: 25 INJECTION, SOLUTION INTRAVENOUS at 08:47

## 2022-08-04 RX ADMIN — ONDANSETRON 4 MG: 2 INJECTION INTRAMUSCULAR; INTRAVENOUS at 07:49

## 2022-08-04 RX ADMIN — LIDOCAINE HYDROCHLORIDE 50 MG: 10 INJECTION, SOLUTION EPIDURAL; INFILTRATION; INTRACAUDAL; PERINEURAL at 07:03

## 2022-08-04 RX ADMIN — Medication 100 MCG: at 07:58

## 2022-08-04 RX ADMIN — CEFUROXIME SODIUM 1.5 G: 1.5 INJECTION, POWDER, FOR SOLUTION INTRAVENOUS at 22:21

## 2022-08-04 RX ADMIN — FENTANYL CITRATE 50 MCG: 0.05 INJECTION, SOLUTION INTRAMUSCULAR; INTRAVENOUS at 09:52

## 2022-08-04 RX ADMIN — FUROSEMIDE 20 MG: 20 TABLET ORAL at 13:23

## 2022-08-04 RX ADMIN — GLYCOPYRROLATE 0.2 MG: 0.2 INJECTION INTRAMUSCULAR; INTRAVENOUS at 07:31

## 2022-08-04 RX ADMIN — IPRATROPIUM BROMIDE AND ALBUTEROL SULFATE 3 ML: .5; 3 SOLUTION RESPIRATORY (INHALATION) at 15:59

## 2022-08-04 RX ADMIN — LISINOPRIL 40 MG: 40 TABLET ORAL at 13:23

## 2022-08-04 RX ADMIN — Medication 100 MCG: at 07:34

## 2022-08-04 RX ADMIN — NICARDIPINE HYDROCHLORIDE 0.4 MG: 0.1 INJECTION, SOLUTION INTRAVENOUS at 07:45

## 2022-08-04 RX ADMIN — NICARDIPINE HYDROCHLORIDE: 25 INJECTION INTRAVENOUS at 12:21

## 2022-08-04 RX ADMIN — CEFUROXIME SODIUM 1.5 G: 1.5 INJECTION, POWDER, FOR SOLUTION INTRAVENOUS at 15:33

## 2022-08-04 RX ADMIN — EPHEDRINE SULFATE 5 MG: 50 INJECTION INTRAVENOUS at 07:13

## 2022-08-04 RX ADMIN — NICARDIPINE HYDROCHLORIDE 15 MG/HR: 25 INJECTION, SOLUTION INTRAVENOUS at 11:13

## 2022-08-04 RX ADMIN — SODIUM CHLORIDE 30 ML/HR: 4.5 INJECTION, SOLUTION INTRAVENOUS at 10:00

## 2022-08-04 RX ADMIN — FAMOTIDINE 20 MG: 20 TABLET ORAL at 06:04

## 2022-08-04 RX ADMIN — FENTANYL CITRATE 100 MCG: 50 INJECTION, SOLUTION INTRAMUSCULAR; INTRAVENOUS at 07:20

## 2022-08-04 RX ADMIN — ESMOLOL HYDROCHLORIDE 80 MG: 10 INJECTION, SOLUTION INTRAVENOUS at 07:03

## 2022-08-04 RX ADMIN — PHENYLEPHRINE HYDROCHLORIDE 1 MCG/KG/MIN: 10 INJECTION INTRAVENOUS at 07:14

## 2022-08-04 RX ADMIN — SODIUM CHLORIDE, POTASSIUM CHLORIDE, SODIUM LACTATE AND CALCIUM CHLORIDE 9 ML/HR: 600; 310; 30; 20 INJECTION, SOLUTION INTRAVENOUS at 05:52

## 2022-08-04 RX ADMIN — FENTANYL CITRATE 50 MCG: 50 INJECTION, SOLUTION INTRAMUSCULAR; INTRAVENOUS at 09:36

## 2022-08-04 RX ADMIN — NICARDIPINE HYDROCHLORIDE 5 MG/HR: 25 INJECTION, SOLUTION INTRAVENOUS at 17:01

## 2022-08-04 RX ADMIN — NICARDIPINE HYDROCHLORIDE 0.2 MG: 0.1 INJECTION, SOLUTION INTRAVENOUS at 07:47

## 2022-08-04 RX ADMIN — FENTANYL CITRATE 50 MCG: 50 INJECTION, SOLUTION INTRAMUSCULAR; INTRAVENOUS at 10:42

## 2022-08-04 RX ADMIN — HEPARIN SODIUM 7000 UNITS: 1000 INJECTION, SOLUTION INTRAVENOUS; SUBCUTANEOUS at 07:25

## 2022-08-04 RX ADMIN — NICARDIPINE HYDROCHLORIDE 15 MG/HR: 25 INJECTION, SOLUTION INTRAVENOUS at 09:32

## 2022-08-04 RX ADMIN — FENTANYL CITRATE 50 MCG: 50 INJECTION, SOLUTION INTRAMUSCULAR; INTRAVENOUS at 09:18

## 2022-08-04 RX ADMIN — PROPOFOL 150 MG: 10 INJECTION, EMULSION INTRAVENOUS at 07:03

## 2022-08-04 RX ADMIN — AMLODIPINE BESYLATE 5 MG: 5 TABLET ORAL at 13:23

## 2022-08-04 RX ADMIN — NICARDIPINE HYDROCHLORIDE 10 MG/HR: 25 INJECTION INTRAVENOUS at 07:43

## 2022-08-04 RX ADMIN — LIDOCAINE HYDROCHLORIDE 0.5 ML: 10 INJECTION, SOLUTION EPIDURAL; INFILTRATION; INTRACAUDAL; PERINEURAL at 05:52

## 2022-08-04 RX ADMIN — Medication 100 MCG: at 07:14

## 2022-08-04 RX ADMIN — ROCURONIUM BROMIDE 50 MG: 10 INJECTION, SOLUTION INTRAVENOUS at 07:03

## 2022-08-04 RX ADMIN — IPRATROPIUM BROMIDE AND ALBUTEROL SULFATE 3 ML: .5; 2.5 SOLUTION RESPIRATORY (INHALATION) at 06:50

## 2022-08-04 RX ADMIN — PROTAMINE SULFATE 25 MG: 10 INJECTION, SOLUTION INTRAVENOUS at 07:56

## 2022-08-04 RX ADMIN — NICARDIPINE HYDROCHLORIDE 10 MG/HR: 25 INJECTION, SOLUTION INTRAVENOUS at 13:34

## 2022-08-04 RX ADMIN — NICARDIPINE HYDROCHLORIDE: 25 INJECTION INTRAVENOUS at 12:22

## 2022-08-04 RX ADMIN — FENTANYL CITRATE 50 MCG: 0.05 INJECTION, SOLUTION INTRAMUSCULAR; INTRAVENOUS at 09:36

## 2022-08-04 RX ADMIN — NICARDIPINE HYDROCHLORIDE 0.2 MG: 0.1 INJECTION, SOLUTION INTRAVENOUS at 07:43

## 2022-08-04 RX ADMIN — Medication 10 ML: at 12:17

## 2022-08-04 RX ADMIN — DEXAMETHASONE SODIUM PHOSPHATE 4 MG: 10 INJECTION INTRAMUSCULAR; INTRAVENOUS at 07:20

## 2022-08-04 RX ADMIN — SUGAMMADEX 200 MG: 100 INJECTION, SOLUTION INTRAVENOUS at 08:02

## 2022-08-04 RX ADMIN — HYDROCODONE BITARTRATE AND ACETAMINOPHEN 1 TABLET: 7.5; 325 TABLET ORAL at 19:27

## 2022-08-04 RX ADMIN — Medication 1 SPRAY: at 18:21

## 2022-08-04 NOTE — ANESTHESIA PROCEDURE NOTES
Arterial Line      Patient reassessed immediately prior to procedure    Patient location during procedure: pre-op   Line placed for hemodynamic monitoring.  Performed By   Anesthesiologist: Daniel Christiansen MD  Preanesthetic Checklist  Completed: patient identified, IV checked, site marked, risks and benefits discussed, surgical consent, monitors and equipment checked, pre-op evaluation and timeout performed  Arterial Line Prep   Sterile Tech: cap, gloves and sterile barriers  Prep: ChloraPrep  Patient monitoring: blood pressure monitoring, continuous pulse oximetry and EKG  Arterial Line Procedure   Laterality:right  Location:  radial artery  Catheter size: 20 G   Guidance: palpation technique  Number of attempts: 1  Successful placement: yes  Post Assessment   Dressing Type: line sutured, occlusive dressing applied, secured with tape and wrist guard applied.   Complications no  Circ/Move/Sens Assessment: normal and unchanged.   Patient Tolerance: patient tolerated the procedure well with no apparent complications

## 2022-08-04 NOTE — ANESTHESIA PREPROCEDURE EVALUATION
Anesthesia Evaluation     Patient summary reviewed and Nursing notes reviewed                Airway   Mallampati: II  TM distance: >3 FB  Neck ROM: full  No difficulty expected  Dental - normal exam     Pulmonary - normal exam   (+) a smoker Current, COPD, home oxygen (prn), sleep apnea on CPAP,   Cardiovascular - normal exam    (+) hypertension, CAD, cardiac stents (asa/plavix tx; last dose yesterday) more than 12 months ago PVD, hyperlipidemia,  carotid artery disease (s/p R CEA 5/22)    ROS comment:   Echo 2/22: normal EF, mild TR (RVSP 35-45)        Neuro/Psych- negative ROS  GI/Hepatic/Renal/Endo - negative ROS     Musculoskeletal (-) negative ROS    Abdominal  - normal exam    Bowel sounds: normal.   Substance History - negative use     OB/GYN negative ob/gyn ROS         Other                        Anesthesia Plan    ASA 3     general     (A line)  intravenous induction     Anesthetic plan, risks, benefits, and alternatives have been provided, discussed and informed consent has been obtained with: patient.    Plan discussed with CRNA.        CODE STATUS:

## 2022-08-04 NOTE — PROGRESS NOTES
Intensive Care Admission Note     Chief Complaint:  Left carotid stenosis    History of Present Illness     Cassy SARA Olvera is a 76 y.o. female who presents to [facility] on 8/4/22 for scheduled left carotid endarterectomy per Dr. Finn.     The patient has a PMH of CAD s/p stents, HTN, dyslipidemia, smoking, nocturnal oxygen use, and known bilateral carotid disease s/p right CEA in May by Dr. Finn. There were previously complaints of postural dizziness in the spring which has resolved since surgical intervention. She now presents for left-sided intervention for her 70% L ICA stenosis.     COVID-19 testing on 8/3 negative. She has received 2 doses of the Moderna vaccine.     Pre-op CXR shows a elevation of the right hemidiaphragm which was noted in May. However is not present on the 2014 imaging.     Time spent 10 minutes    Electronically signed by SHEIAL Dean, 08/04/22, 7:22 AM EDT.    Attending attestation:  On arrival to ICU patient is awake and alert.  Blood pressure currently well controlled.  She is on 6 L nasal cannula postoperatively.  Denies any chest pain, nausea, fever, or chills.    Problem List, Surgical History, Family, Social History, and ROS     Patient Active Problem List    Diagnosis    • *70% L ICA stenosis  [I65.22]    • Class 1 obesity in adult [E66.9]    • Carotid stenosis, asymptomatic, bilateral [I65.23]    • s/p R CEA 5/2022  [I65.21]    • Dizziness [R42]    • COPD (chronic obstructive pulmonary disease) ([facility]) [J44.9]    • Grade I diastolic dysfunction [I51.89]    • Healthcare maintenance [Z00.00]    • Asymptomatic PVD (peripheral vascular disease) ([facility]) [I73.9]    • Peripheral edema [R60.9]    • Elbow fracture, left [S42.402A]    • Smoking [F17.200]    • Hypertension [I10]    • CAD prior RCA stents x2 [I25.119]    • Shortness of breath [R06.02]    • Mixed hyperlipidemia [E78.2]      Past Surgical History:   Procedure Laterality Date   • CARDIAC CATHETERIZATION  2018    stents x 2  to RCA   • CAROTID ENDARTERECTOMY Right 5/17/2022    Procedure: CAROTID ENDARTERECTOMY WITH EEG RIGHT;  Surgeon: Shiraz Finn MD;  Location: [facility];  Service: Vascular;  Laterality: Right;   • COLONOSCOPY W/ BIOPSIES     • CORONARY STENT PLACEMENT  01/20/2014    Stent x1 01/20/14   • ELBOW PROCEDURE Left     fracture repair   • HYSTERECTOMY         No Known Allergies  No current facility-administered medications on file prior to encounter.     Current Outpatient Medications on File Prior to Encounter   Medication Sig   • amLODIPine (NORVASC) 5 MG tablet Take 1 tablet by mouth Daily.   • aspirin 81 MG tablet Take 1 tablet by mouth Daily.   • clopidogrel (Plavix) 75 MG tablet Take 1 tablet by mouth Daily.   • Coenzyme Q10 (Co Q-10) 100 MG capsule Take 100 mg by mouth 2 (Two) Times a Day.   • furosemide (LASIX) 20 MG tablet Take 1 tablet by mouth Daily.   • lisinopril (PRINIVIL,ZESTRIL) 40 MG tablet Take 1 tablet by mouth Daily.   • rosuvastatin (CRESTOR) 20 MG tablet Take 1 tablet by mouth Daily.   • nitroglycerin (NITROSTAT) 0.4 MG SL tablet Place 1 tablet under the tongue Every 5 (Five) Minutes As Needed for Chest Pain. Take no more than 3 doses in 15 minutes.     MEDICATION LIST AND ALLERGIES REVIEWED.    Family History   Problem Relation Age of Onset   • Hypertension Mother    • Heart disease Mother    • Diabetes Mother    • Heart attack Father      Social History     Tobacco Use   • Smoking status: Current Every Day Smoker     Packs/day: 1.50     Years: 50.00     Pack years: 75.00     Types: Cigarettes   • Smokeless tobacco: Never Used   Vaping Use   • Vaping Use: Never used   Substance Use Topics   • Alcohol use: No   • Drug use: No     Social History     Social History Narrative    Pt lives in [place], [place].      FAMILY AND SOCIAL HISTORY REVIEWED.    Review of Systems   Constitutional: Negative for activity change, appetite change, chills and fever.   HENT: Negative for congestion, sore throat and  "voice change.    Eyes: Negative for photophobia and visual disturbance.   Respiratory: Negative for cough, shortness of breath and wheezing.    Cardiovascular: Negative for chest pain, palpitations and leg swelling.   Gastrointestinal: Negative for abdominal distention and abdominal pain.   Genitourinary: Negative for difficulty urinating and flank pain.   Musculoskeletal: Negative for myalgias and neck stiffness.   Skin: Negative for color change and rash.   Neurological: Negative for dizziness, seizures and headaches.   Hematological: Negative for adenopathy.   Psychiatric/Behavioral: Negative for agitation, hallucinations and sleep disturbance.     ALL OTHER SYSTEMS REVIEWED AND ARE NEGATIVE.    Physical Exam and Clinical Information   BP (!) 102/37   Pulse 65   Temp 97 °F (36.1 °C) (Temporal)   Resp 16   Ht 162.6 cm (64.02\")   Wt 79.4 kg (175 lb)   SpO2 91%   BMI 30.02 kg/m²   Physical Exam  Vitals and nursing note reviewed.   Constitutional:       General: She is not in acute distress.     Appearance: She is well-developed and normal weight. She is not ill-appearing or toxic-appearing.   HENT:      Head: Normocephalic and atraumatic.      Right Ear: External ear normal.      Left Ear: External ear normal.      Nose: Nose normal.      Mouth/Throat:      Mouth: Mucous membranes are moist.      Pharynx: Oropharynx is clear. No oropharyngeal exudate or posterior oropharyngeal erythema.   Eyes:      Conjunctiva/sclera: Conjunctivae normal.      Pupils: Pupils are equal, round, and reactive to light.   Cardiovascular:      Rate and Rhythm: Normal rate and regular rhythm.      Pulses: Normal pulses.      Heart sounds: Normal heart sounds. No murmur heard.    No friction rub. No gallop.   Pulmonary:      Effort: Pulmonary effort is normal. No respiratory distress.      Breath sounds: Normal breath sounds. No wheezing, rhonchi or rales.   Abdominal:      General: Bowel sounds are normal. There is no distension. "      Palpations: Abdomen is soft.      Tenderness: There is no abdominal tenderness. There is no rebound.   Musculoskeletal:         General: Normal range of motion.      Cervical back: Normal range of motion and neck supple. No rigidity.      Right lower leg: No edema.      Left lower leg: No edema.   Skin:     General: Skin is warm and dry.      Capillary Refill: Capillary refill takes less than 2 seconds.   Neurological:      General: No focal deficit present.      Mental Status: She is alert and oriented to person, place, and time.   Psychiatric:         Mood and Affect: Mood normal.         Behavior: Behavior normal.         Thought Content: Thought content normal.         Judgment: Judgment normal.         Results from last 7 days   Lab Units 08/03/22  1303   WBC 10*3/mm3 10.56   HEMOGLOBIN g/dL 14.6   PLATELETS 10*3/mm3 317     Results from last 7 days   Lab Units 08/03/22  1303   SODIUM mmol/L 137   POTASSIUM mmol/L 4.2   CO2 mmol/L 37.0*   BUN mg/dL 9   CREATININE mg/dL 0.68   GLUCOSE mg/dL 79     Estimated Creatinine Clearance: 71.8 mL/min (by C-G formula based on SCr of 0.68 mg/dL).  Results from last 7 days   Lab Units 08/03/22  1303   HEMOGLOBIN A1C % 5.80*         No results found for: LACTATE       I reviewed the patient's results/ images and I agree with the reports.     Impression     70% L ICA stenosis     CAD prior RCA stents x2    Mixed hyperlipidemia    Hypertension    Smoking    s/p R CEA 5/2022     Class 1 obesity in adult    Carotid stenosis, asymptomatic, bilateral      Plan/Recommendations     76-year-old female with past medical history sniffing coronary disease status post stents, hypertension, dyslipidemia, tobacco abuse, nocturnal oxygen use, and bilateral coronary artery disease status post right CEA by Dr. Finn.  Who presents to [facility] ICU on 8/4/2022 status post left CEA.  Patient requiring increased oxygen demand postoperatively.    -Postop orders per  surgery  -Nicardipine for blood pressure control per protocol, restart home antihypertensive medications  -Continue close neurological monitoring  -Aspirin/statin/Plavix  -Ensure adequate pain control  -Mobilize patient per protocol  -Aggressive pulmonary toilet, wean oxygen to saturation greater than 88%  -DuoNebs as needed  -SCDs for DVT prophylaxis  -A.m. labs      BRIGIDA Garner DO  Pulmonary and Critical Care Medicine  08/04/22 13:43 EDT     CC: Baltazar Joseph MD

## 2022-08-04 NOTE — OP NOTE
Operative Report    Preop Diagnosis: Left internal carotid artery stenosis.  Previous right internal carotid endarterectomy      Postoperative Diagnosis: Same      Procedure: Left internal carotid endarterectomy with pericardial patch closure and EEG monitoring        Surgeons: Shiraz Finn MD      Assistant: Erickson Figueroa PA-C    The Assistant provided services of suctioning, irrigation and retraction.  Also, assisted in suture closure of parts of the skin incision.      Indication: This patient had been referred to me with bilateral carotid artery stenosis.  Approximately 2 months ago she had undergone a right internal carotid endarterectomy and recovered well from that procedure.  She now is planning for her left carotid endarterectomy.  She understands that there is risk of stroke bleed infection death and no guarantees been made as to outcome.  She also understands that although she had a satisfactory result with surgery on the right carotid artery that that in no way guarantees a satisfactory result on the left carotid.        Description: Supine position.  Sterile prep and drape.  Antibiotics given and general endotracheal anesthesia.  An incision was made on the left neck anterior to the sternocleidomastoid and through the platysma.  Care was taken to identify not injure the hypoglossal and vagus nerves.  Heparin was given.  The internal/external and common carotid arteries were sequentially clamped and no deleterious EEG changes were encountered.  The artery was opened there was a heavy calcific plaque with over 80 to 85% narrowing.  It was endarterectomized to a smooth surface..  The plaque was sent to pathology.  Brisk backbleeding was noted from the internal carotid.  Pericardial patch was sutured in place with 6-0 Prolene suture and the clamps were released in the proper sequential fashion.  Good hemostasis was obtained and again no deleterious EEG changes were encountered.  A Juan Manuel-Perla drain was  placed the incision closed with multiple layers of Vicryl suture and the sponge and needle count reported as correct.  Estimated blood loss less than 75 mL.      Please note that portions of this note were completed with a voice recognition program. Efforts were made to edit the dictations, but occasionally words are mistranscribed.

## 2022-08-04 NOTE — H&P
"Pre-Op H&P  Cassy Olvera  [id number]  1945    Chief complaint: \"I have a blockage in my neck\"    HPI:    Patient is a 76 y.o.female who presents with past history of right carotid endarterectomy, asymptomatic.  She is known stenosis of her left carotid artery.  She is now admitted for left carotid endarterectomy.  She has known coronary artery disease status post stenting x2, hypertension ongoing use of smoking.    Review of Systems:  General ROS: negative for chills, fever or skin lesions;  No changes since last office visit.  Neg for recent sick exposure  Cardiovascular ROS: no chest pain or dyspnea on exertion  Respiratory ROS: no cough, shortness of breath, or wheezing    Allergies: No Known Allergies    Home Meds:    No current facility-administered medications on file prior to encounter.     Current Outpatient Medications on File Prior to Encounter   Medication Sig Dispense Refill   • amLODIPine (NORVASC) 5 MG tablet Take 1 tablet by mouth Daily. 90 tablet 3   • aspirin 81 MG tablet Take 1 tablet by mouth Daily. 90 tablet 3   • clopidogrel (Plavix) 75 MG tablet Take 1 tablet by mouth Daily. 30 tablet 11   • Coenzyme Q10 (Co Q-10) 100 MG capsule Take 100 mg by mouth 2 (Two) Times a Day. 180 capsule 3   • furosemide (LASIX) 20 MG tablet Take 1 tablet by mouth Daily. 90 tablet 3   • lisinopril (PRINIVIL,ZESTRIL) 40 MG tablet Take 1 tablet by mouth Daily. 90 tablet 3   • rosuvastatin (CRESTOR) 20 MG tablet Take 1 tablet by mouth Daily. 90 tablet 3   • nitroglycerin (NITROSTAT) 0.4 MG SL tablet Place 1 tablet under the tongue Every 5 (Five) Minutes As Needed for Chest Pain. Take no more than 3 doses in 15 minutes. 25 tablet 6       PMH:   Past Medical History:   Diagnosis Date   • Carotid artery disease (HCC)    • COPD (chronic obstructive pulmonary disease) (HCC)    • Coronary artery disease    • COVID-19 vaccine administered     02/26/2021 , 03/26/2021 . 11/04/2021 - Moderna   • Hyperlipidemia    • " "Hypertension    • On home oxygen therapy     uses PRN and at HS     PSH:    Past Surgical History:   Procedure Laterality Date   • CARDIAC CATHETERIZATION  2018    stents x 2 to RCA   • CAROTID ENDARTERECTOMY Right 5/17/2022    Procedure: CAROTID ENDARTERECTOMY WITH EEG RIGHT;  Surgeon: Shiraz Finn MD;  Location: [facility];  Service: Vascular;  Laterality: Right;   • COLONOSCOPY W/ BIOPSIES     • CORONARY STENT PLACEMENT  01/20/2014    Stent x1 01/20/14   • ELBOW PROCEDURE Left     fracture repair   • HYSTERECTOMY       Patient denies allergy to contrast dye or latex  Immunization History:  Influenza: Yes  Pneumococcal: Yes  Tetanus: Up-to-date    Social History:   Tobacco:   Social History     Tobacco Use   Smoking Status Current Every Day Smoker   • Packs/day: 1.50   • Years: 50.00   • Pack years: 75.00   • Types: Cigarettes   Smokeless Tobacco Never Used      Alcohol:     Social History     Substance and Sexual Activity   Alcohol Use No       Vitals:           /80 (BP Location: Right arm, Patient Position: Lying)   Pulse 70   Temp 97 °F (36.1 °C) (Temporal)   Resp 22   Ht 162.6 cm (64.02\")   Wt 79.4 kg (175 lb)   SpO2 100%   BMI 30.02 kg/m²     Physical Exam:  General Appearance:    Alert, cooperative, no distress, appears stated age   Head:    Normocephalic, without obvious abnormality, atraumatic   Lungs:    Rhonchi bilaterally, occasional expiratory wheezes bilaterally.  Increased AP diameter.    Heart:  S1-S2 without rubs murmurs gallops    Abdomen:   Soft, nontender, bowel sounds present throughout   Breast Exam:    deferred   Genitalia:    deferred   Extremities:   Extremities normal, atraumatic, no cyanosis or edema   Skin:   Skin color, texture, turgor normal, no rashes or lesions   Neurologic:   Grossly intact   Results Review  LABS:  Lab Results   Component Value Date    WBC 10.56 08/03/2022    HGB 14.6 08/03/2022    HCT 42.8 08/03/2022    MCV 93.0 08/03/2022     08/03/2022 "    NEUTROABS 12.83 (H) 05/18/2022    GLUCOSE 79 08/03/2022    BUN 9 08/03/2022    CREATININE 0.68 08/03/2022    EGFRIFNONA 68 01/24/2022     08/03/2022    K 4.2 08/03/2022    CL 94 (L) 08/03/2022    CO2 37.0 (H) 08/03/2022    MG 2.1 05/18/2022    PHOS 3.8 05/18/2022    CALCIUM 9.9 08/03/2022    ALBUMIN 4.87 01/24/2022    AST 20 01/24/2022    ALT 13 01/24/2022    BILITOT 0.3 01/24/2022    INR 0.91 08/03/2022       RADIOLOGY:  Chest X-Ray PA & Lateral    Result Date: 8/3/2022  DATE OF EXAM: 8/3/2022 1:50 PM  PROCEDURE: XR CHEST PA AND LATERAL-  INDICATIONS: Pre-Op Cardiac Surgery; I65.23-Occlusion and stenosis of bilateral carotid arteries.  COMPARISON: Chest x-ray 5/16/2022  TECHNIQUE: Frontal and lateral views of the chest.  FINDINGS: Stable cardiomediastinal silhouette within normal limits. Redemonstration of elevated right hemidiaphragm. The lungs are grossly clear. No pleural effusion or pneumothorax. Redemonstration of focal lower thoracic kyphosis at chronic appearing superior endplate deformity of a lower thoracic vertebral body.      No acute cardiopulmonary findings.  This report was finalized on 8/3/2022 3:34 PM by Eloy Leong MD.         I reviewed the patient's new clinical results.    Cancer Staging (if applicable)  Cancer Patient: __ yes __no __unknown; If yes, clinical stage T:__ N:__M:__, stage group or __N/A    Impression: Left carotid artery stenosis  Cerebrovascular disease  Status post right carotid endarterectomy  COPD  Ongoing cigarette use  Hypertension  Hyperlipidemia  Chronic Plavix therapy  Status post coronary stenting x2  Home O2 as needed and at bedtime    Plan: Left carotid endarterectomy with EEG monitoring.  Grade the above.  Patient is aware that surgery has risk of stroke bleeding infection death.  She is also aware that although she had a satisfactory result with surgery on the right side of her neck that that in no way guarantees a satisfactory result on the left  side.    EZEQUIEL Alvares   08/04/22   6:36 AM EDT

## 2022-08-04 NOTE — ANESTHESIA PROCEDURE NOTES
Airway  Urgency: elective    Date/Time: 8/4/2022 7:06 AM  Airway not difficult    General Information and Staff    Patient location during procedure: OR  Anesthesiologist: Daniel Christiansen MD  CRNA/CAA: Pierce Pisano CRNA    Indications and Patient Condition  Indications for airway management: airway protection    Preoxygenated: yes  MILS not maintained throughout  Mask difficulty assessment: 1 - vent by mask    Final Airway Details  Final airway type: endotracheal airway      Successful airway: ETT  Cuffed: yes   Successful intubation technique: direct laryngoscopy  Endotracheal tube insertion site: oral  Blade: Hugo  Blade size: 3  ETT size (mm): 7.5  Cormack-Lehane Classification: grade I - full view of glottis  Placement verified by: chest auscultation and capnometry   Measured from: lips  ETT/EBT  to lips (cm): 20  Number of attempts at approach: 1  Assessment: lips, teeth, and gum same as pre-op and atraumatic intubation    Additional Comments  Negative epigastric sounds, Breath sound equal bilaterally with symmetric chest rise and fall

## 2022-08-04 NOTE — ANESTHESIA POSTPROCEDURE EVALUATION
Patient: Cassy RUIZ May    Procedure Summary     Date: 08/04/22 Room / Location:  [facility]  /  [facility]    Anesthesia Start: 0657 Anesthesia Stop:     Procedure: LEFT CAROTID ENDARTERECTOMY WITH EEG (Left Neck) Diagnosis:       Stenosis of left carotid artery      (Stenosis of left carotid artery [I65.22])    Surgeons: Shiraz Finn MD Provider: Daniel Christiansen MD    Anesthesia Type: general ASA Status: 3          Anesthesia Type: general    Vitals  Vitals Value Taken Time   /101 08/04/22 0845   Temp     Pulse 89 08/04/22 0845   Resp     SpO2 95 % 08/04/22 0848   Vitals shown include unvalidated device data.        Post Anesthesia Care and Evaluation    Patient location during evaluation: PACU  Patient participation: complete - patient participated  Level of consciousness: awake and alert  Pain score: 0  Pain management: adequate    Airway patency: patent  Anesthetic complications: No anesthetic complications  PONV Status: none  Cardiovascular status: hemodynamically stable and acceptable  Respiratory status: nonlabored ventilation, acceptable, nasal cannula and spontaneous ventilation  Hydration status: acceptable  No anesthesia care post op

## 2022-08-05 VITALS
BODY MASS INDEX: 29.88 KG/M2 | HEART RATE: 92 BPM | WEIGHT: 175 LBS | SYSTOLIC BLOOD PRESSURE: 114 MMHG | TEMPERATURE: 98 F | HEIGHT: 64 IN | DIASTOLIC BLOOD PRESSURE: 58 MMHG | OXYGEN SATURATION: 94 % | RESPIRATION RATE: 16 BRPM

## 2022-08-05 LAB
ANION GAP SERPL CALCULATED.3IONS-SCNC: 6 MMOL/L (ref 5–15)
BASOPHILS # BLD AUTO: 0.02 10*3/MM3 (ref 0–0.2)
BASOPHILS NFR BLD AUTO: 0.1 % (ref 0–1.5)
BUN SERPL-MCNC: 10 MG/DL (ref 8–23)
BUN/CREAT SERPL: 15.6 (ref 7–25)
CALCIUM SPEC-SCNC: 8.7 MG/DL (ref 8.6–10.5)
CHLORIDE SERPL-SCNC: 94 MMOL/L (ref 98–107)
CO2 SERPL-SCNC: 28 MMOL/L (ref 22–29)
CREAT SERPL-MCNC: 0.64 MG/DL (ref 0.57–1)
CYTO UR: NORMAL
DEPRECATED RDW RBC AUTO: 42.2 FL (ref 37–54)
EGFRCR SERPLBLD CKD-EPI 2021: 91.7 ML/MIN/1.73
EOSINOPHIL # BLD AUTO: 0 10*3/MM3 (ref 0–0.4)
EOSINOPHIL NFR BLD AUTO: 0 % (ref 0.3–6.2)
ERYTHROCYTE [DISTWIDTH] IN BLOOD BY AUTOMATED COUNT: 12 % (ref 12.3–15.4)
GLUCOSE SERPL-MCNC: 123 MG/DL (ref 65–99)
HCT VFR BLD AUTO: 35.2 % (ref 34–46.6)
HGB BLD-MCNC: 11.9 G/DL (ref 12–15.9)
IMM GRANULOCYTES # BLD AUTO: 0.1 10*3/MM3 (ref 0–0.05)
IMM GRANULOCYTES NFR BLD AUTO: 0.5 % (ref 0–0.5)
LAB AP CASE REPORT: NORMAL
LAB AP CLINICAL INFORMATION: NORMAL
LYMPHOCYTES # BLD AUTO: 1.01 10*3/MM3 (ref 0.7–3.1)
LYMPHOCYTES NFR BLD AUTO: 5.3 % (ref 19.6–45.3)
MCH RBC QN AUTO: 32.2 PG (ref 26.6–33)
MCHC RBC AUTO-ENTMCNC: 33.8 G/DL (ref 31.5–35.7)
MCV RBC AUTO: 95.1 FL (ref 79–97)
MONOCYTES # BLD AUTO: 1.21 10*3/MM3 (ref 0.1–0.9)
MONOCYTES NFR BLD AUTO: 6.4 % (ref 5–12)
NEUTROPHILS NFR BLD AUTO: 16.62 10*3/MM3 (ref 1.7–7)
NEUTROPHILS NFR BLD AUTO: 87.7 % (ref 42.7–76)
NRBC BLD AUTO-RTO: 0 /100 WBC (ref 0–0.2)
PATH REPORT.FINAL DX SPEC: NORMAL
PATH REPORT.GROSS SPEC: NORMAL
PLATELET # BLD AUTO: 246 10*3/MM3 (ref 140–450)
PMV BLD AUTO: 10 FL (ref 6–12)
POTASSIUM SERPL-SCNC: 4.6 MMOL/L (ref 3.5–5.2)
RBC # BLD AUTO: 3.7 10*6/MM3 (ref 3.77–5.28)
SODIUM SERPL-SCNC: 128 MMOL/L (ref 136–145)
WBC NRBC COR # BLD: 18.96 10*3/MM3 (ref 3.4–10.8)

## 2022-08-05 PROCEDURE — 99024 POSTOP FOLLOW-UP VISIT: CPT

## 2022-08-05 PROCEDURE — 80048 BASIC METABOLIC PNL TOTAL CA: CPT | Performed by: PHYSICIAN ASSISTANT

## 2022-08-05 PROCEDURE — 85025 COMPLETE CBC W/AUTO DIFF WBC: CPT | Performed by: PHYSICIAN ASSISTANT

## 2022-08-05 RX ORDER — NICOTINE 21 MG/24HR
1 PATCH, TRANSDERMAL 24 HOURS TRANSDERMAL
Status: DISCONTINUED | OUTPATIENT
Start: 2022-08-05 | End: 2022-08-05 | Stop reason: HOSPADM

## 2022-08-05 RX ORDER — NICOTINE 21 MG/24HR
1 PATCH, TRANSDERMAL 24 HOURS TRANSDERMAL
Qty: 30 EACH | Refills: 1 | Status: SHIPPED | OUTPATIENT
Start: 2022-08-06

## 2022-08-05 RX ADMIN — NICARDIPINE HYDROCHLORIDE 7.5 MG/HR: 25 INJECTION, SOLUTION INTRAVENOUS at 04:11

## 2022-08-05 RX ADMIN — HYDROCODONE BITARTRATE AND ACETAMINOPHEN 1 TABLET: 7.5; 325 TABLET ORAL at 03:02

## 2022-08-05 NOTE — DISCHARGE SUMMARY
[facility] Cardiothoracic Surgery Discharge Summary    Name:  Cassy Olvera  MRN Number:  [id number]  Date of Admission: 8/4/2022  Date of Discharge:  8/5/2022    Referred By: Shiraz Finn MD  PCP: Baltazar Joseph MD  IP Care Team:  Patient Care Team:  Baltazar Joseph MD as PCP - General (Internal Medicine)    Discharge Diagnosis:  Past Medical History:   Diagnosis Date   • Carotid artery disease (HCC)    • COPD (chronic obstructive pulmonary disease) (HCC)    • Coronary artery disease    • COVID-19 vaccine administered     02/26/2021 , 03/26/2021 . 11/04/2021 - Moderna   • Hyperlipidemia    • Hypertension    • On home oxygen therapy     uses PRN and at HS     Active Hospital Problems    Diagnosis  POA   • **70% L ICA stenosis  [I65.22]  Yes   • Class 1 obesity in adult [E66.9]  Yes   • Carotid stenosis, asymptomatic, bilateral [I65.23]  Yes   • s/p R CEA 5/2022  [I65.21]  Yes   • Smoking [F17.200]  Yes   • Hypertension [I10]  Yes   • CAD prior RCA stents x2 [I25.119]  Yes   • Mixed hyperlipidemia [E78.2]  Yes      Resolved Hospital Problems   No resolved problems to display.     Stenosis of left carotid artery [I65.22]  Carotid stenosis, asymptomatic, bilateral [I65.23]    Procedures Performed:  Procedure(s):  LEFT CAROTID ENDARTERECTOMY WITH EEG       Operative Pathology:    Pending    History of Present Illness:    Cassy Olvera is a 76 y.o. female current smoker, with history of HTN, HLD on statin therapy, COPD on supplemental oxygen as needed, PVD, CAD with prior RCA stenting x2, and bilateral carotid artery stenosis s/p right carotid endarterectomy on 5/17/2022 by Dr. Finn.  Patient had uncomplicated postoperative course and was discharged on POD #1. Preoperative imaging demonstrating 90% right internal carotid artery stenosis and over 70% left-sided stenosis.  Anticipated left carotid endarterectomy once patient recovered well from right carotid endarterectomy.  Patient  presents today for postoperative follow-up. Pt denies hx of TIA or CVA since last visit, has had no new focal neurologic dysfunction, specifically vision changes or amaurosis fugax.  Patient's right CEA incision completely healed.  She denies any fevers, chills, or body aches.  She been compliant with ASA/Plavix therapy.  She is ready to proceed with left CEA.    Hospital Course:  Patient was admitted to [facility] on 8/4/2020 for scheduled left carotid endarterectomy with Dr. Finn.  Patient was extubated without complication was sent to ICU for further observation.  POD 1: ELIZABETH drain and Art line were removed. Patient was noted to be agitated, pulling out lines and throwing things at staff and requesting to leave AGAINST MEDICAL ADVICE.  After talking with the patient on 2 different occasions patient despite my efforts she was adamant that she wanted to leave AGAINST MEDICAL ADVICE.     Discharge Medications:     Discharge Medications      New Medications      Instructions Start Date   nicotine 14 MG/24HR patch  Commonly known as: NICODERM CQ   1 patch, Transdermal, Every 24 Hours Scheduled   Start Date: August 6, 2022        Continue These Medications      Instructions Start Date   amLODIPine 5 MG tablet  Commonly known as: NORVASC   5 mg, Oral, Daily      aspirin 81 MG tablet   81 mg, Oral, Daily      clopidogrel 75 MG tablet  Commonly known as: Plavix   75 mg, Oral, Daily      Co Q-10 100 MG capsule   100 mg, Oral, 2 Times Daily      furosemide 20 MG tablet  Commonly known as: LASIX   20 mg, Oral, Daily      lisinopril 40 MG tablet  Commonly known as: PRINIVIL,ZESTRIL   40 mg, Oral, Daily      nitroglycerin 0.4 MG SL tablet  Commonly known as: NITROSTAT   0.4 mg, Sublingual, Every 5 Minutes PRN, Take no more than 3 doses in 15 minutes.       rosuvastatin 20 MG tablet  Commonly known as: CRESTOR   20 mg, Oral, Daily             Allergies:  No Known Allergies    Discharge Diet:  Diet Instructions      Diet: Consistent Carbohydrate, Cardiac      Discharge Diet:  Consistent Carbohydrate  Cardiac             Activity at Discharge:  Activity Instructions     Activity as Tolerated      Bathing Restrictions      Type of Restriction: Bathing    Bathing Restrictions: No Tub Bath    Driving Restrictions      Type of Restriction: Driving    Driving Restrictions: No Driving While Taking Narcotics    Lifting Restrictions      Type of Restriction: Lifting    Lifting Restrictions: Lifting Restriction (Indicate Limit)    Weight Limit (Pounds): 10    Length of Lifting Restriction: until seen at next follow-up appointment          Discharge Education:  Tobacco Cessation:  I advised patient to quit, and offered support.  Nicotine patches beginning at 21 mg.  Wound Care:  Patient educated regarding care of post-operative wounds.  Patient is to wash with plain soap and water and pat dry.  Patient is not to use salves on the incision sites.  Patient instructed regarding the signs and symptoms of infection and when to call the office with any concerns.    Follow up Appointments:   Future Appointments   Date Time Provider Department Center   1/25/2023  9:45 AM Radha Carranza APRN [facility]     Additional Instructions for the Follow-ups that You Need to Schedule     Call MD With Problems / Concerns   As directed      Instructions:   Please call the CT Surgery office with any questions or concerns you may have [phone]    Order Comments: Instructions: Please call the CT Surgery office with any questions or concerns you may have [phone]          Discharge Follow-up with PCP   As directed       Currently Documented PCP:    Baltazar Joseph MD    PCP Phone Number:    [phone]     Follow Up Details: Follow-up with your PCP within 1-2 weeks         Discharge Follow-up with Specialty: Cardiothoracic Surgery; 2 Weeks   As directed      Specialty: Cardiothoracic Surgery    Follow Up: 2 Weeks    Follow Up Details:  Follow-up within 2 weeks                 Sanjana Perez, APRN  08/05/22  10:01 EDT    Time: I spent 20 minutes on this discharge activity which included: face-to-face encounter with the patient, reviewing the data in the system, coordination of the care with the nursing staff as well as consultants, documentation, and entering orders.

## 2022-08-05 NOTE — PLAN OF CARE
Patient remains on 6LNC and Nicardipine gtt to maintain SBP < 120. Surgical drsg remains CDI & soft. 45ml output from ELIZABETH drain. UOP adequate. Patient very anxious and agitated this morning, pulling at lines and throwing things at staff wanting to get out of bed. RN educated pt on reason for strict bed rest orders from CT surgery.

## 2022-08-05 NOTE — NURSING NOTE
"0700 Aakash at bedside. Verbal orders to maintain systolic BP less than 150 with nitro and get patient up to chair. Physician verbalized to primary RN and nightshift RN that patient was increasingly getting agitated in regards to staying another day in hospital.     0800 As patient was getting up to chair. Pt refused head-to-toe assessment. Patient was increasingly insistent to leave and go home. Pt was educated on the risk/benefits of staying within the care of [facility]. Patient verbalized understanding but still wanted to go home despite efforts of primary RN.    0808 Talked to Sanjana with CT surgery, in regards of patient wanting to leave AMA. APRN stated she would talk to Aakash to update him of patient status.     0830 SHEILA Allan at bedside with primary RN. Risks and benefits discussed with patient. Pt verbalized she \"would do better at home and she knows her body better than Aakash.\"    0845 Peripheral IVs removed and arterial line removed by primary RN. ELIZABETH drain removed by SHEILA Allan. No complications noted.     0915 Pt left unit via wheelchair assisted by DON Isidro.  accompanying patient to vehicle as well.  "

## 2022-08-05 NOTE — PROGRESS NOTES
[facility] Cardiothoracic Surgery In-Patient Progress Note     #1 Day Post-Op s/p left CEA     LOS: 1 day       Subjective  On 6L NC saturations 94%. Wants to leave AGAINST MEDICAL ADVICE.       Objective  Vital Signs  Temp:  [96 °F (35.6 °C)-98 °F (36.7 °C)] 98 °F (36.7 °C)  Heart Rate:  [] 82  Resp:  [14-18] 16  BP: ()/() 114/58  Arterial Line BP: ()/(28-69) 129/42      Output by Drain (mL) 08/04/22 0701 - 08/04/22 1900 08/04/22 1901 - 08/05/22 0700 08/05/22 0701 - 08/05/22 0732 Range Total   Closed/Suction Drain Anterior Neck Bulb 15 Fr. 20 45  65     Physical Exam:   General Appearance: alert, appears stated age and cooperative   Neuro: Tongue midline, no neurologic deficits   Lungs: Bibasilar rhonchi   Heart: normal S1, S2, no murmur, no gallop, no rub and no click   Skin: Incision c/d/i, no hematoma, tenderness, or warmth     Results   Results from last 7 days   Lab Units 08/05/22  0315   WBC 10*3/mm3 18.96*   HEMOGLOBIN g/dL 11.9*   HEMATOCRIT % 35.2   PLATELETS 10*3/mm3 246     Results from last 7 days   Lab Units 08/05/22  0315   SODIUM mmol/L 128*   POTASSIUM mmol/L 4.6   CHLORIDE mmol/L 94*   CO2 mmol/L 28.0   BUN mg/dL 10   CREATININE mg/dL 0.64   GLUCOSE mg/dL 123*   CALCIUM mg/dL 8.7     Assessment    70% L ICA stenosis     CAD prior RCA stents x2    Mixed hyperlipidemia    Hypertension    Smoking    s/p R CEA 5/2022     Class 1 obesity in adult    Carotid stenosis, asymptomatic, bilateral    Discussed with patient the risks of leaving AGAINST MEDICAL ADVICE patient is adamant about leaving the hospital despite my efforts.     Plan   D/C ELIZABETH and Art line  Will f/u in office in 2 weeks      SHEILA Tanner  08/05/22  07:32 EDT

## 2022-08-29 ENCOUNTER — OFFICE VISIT (OUTPATIENT)
Dept: CARDIAC SURGERY | Facility: CLINIC | Age: 77
End: 2022-08-29

## 2022-08-29 VITALS
HEIGHT: 63 IN | WEIGHT: 173 LBS | TEMPERATURE: 98.2 F | HEART RATE: 88 BPM | DIASTOLIC BLOOD PRESSURE: 76 MMHG | SYSTOLIC BLOOD PRESSURE: 128 MMHG | OXYGEN SATURATION: 97 % | BODY MASS INDEX: 30.65 KG/M2

## 2022-08-29 DIAGNOSIS — Z98.890 S/P CAROTID ENDARTERECTOMY: ICD-10-CM

## 2022-08-29 DIAGNOSIS — I65.23 CAROTID STENOSIS, ASYMPTOMATIC, BILATERAL: Primary | ICD-10-CM

## 2022-08-29 PROCEDURE — 99024 POSTOP FOLLOW-UP VISIT: CPT | Performed by: NURSE PRACTITIONER

## 2022-08-29 NOTE — PROGRESS NOTES
[facility] Cardiothoracic Surgery Office Follow Up Note     Date of Encounter: 2022     Name: Cassy Olvera  : 1945     Referred By: No ref. provider found  PCP: Baltazar Joseph MD    Chief Complaint:    Chief Complaint   Patient presents with   • Hospital Follow Up Visit     Hosp follow up s/p left CEA 22-ROM- carotid artery stenosis. Pt states that her voice is very raspy and weak, she complains of a sore throat on both sides of her throat. SOB w/ and w/o exertion and fatigue.       Subjective      History of Present Illness:    Cassy Olvera is a 76 y.o. female current smoker, with history of HTN, HLD on statin therapy, COPD on supplemental oxygen as needed, PVD, CAD with prior RCA stenting x2, and bilateral carotid artery stenosis s/p right carotid endarterectomy on 2022 by Dr. Finn.  Patient is now s/p left carotid endarterectomy with EEG on 2022 by Dr. Finn.  Patient appears to have had uncomplicated postoperative course but there is note that patient became agitated on POD #1 and left AGAINST MEDICAL ADVICE. She been compliant with ASA/Plavix therapy.  Patient presents today for postoperative follow-up.  Patient has had hoarseness since surgery that she is frustrated is not improving.  Her incision is completely healed.  She denies any trouble swallowing or breathing.Pt denies hx of TIA or CVA since last visit, has had no new focal neurologic dysfunction, specifically vision changes or amaurosis fugax.     Review of Systems:  Review of Systems   Constitutional: Positive for malaise/fatigue. Negative for chills, decreased appetite, diaphoresis, fever, night sweats and weight loss.   HENT: Positive for hoarse voice (pt is very concerned about her voice being so weak ) and sore throat. Negative for congestion and stridor.    Cardiovascular: Positive for dyspnea on exertion and leg swelling (slight ankle swelling). Negative for chest pain, claudication,  irregular heartbeat, near-syncope, orthopnea, palpitations, paroxysmal nocturnal dyspnea and syncope.   Respiratory: Positive for cough, shortness of breath, sputum production and wheezing. Negative for hemoptysis, sleep disturbances due to breathing and snoring.    Hematologic/Lymphatic: Positive for bleeding problem. Negative for adenopathy. Bruises/bleeds easily.   Skin: Negative for color change, dry skin, itching, poor wound healing and rash.   Musculoskeletal: Negative for arthritis, back pain, falls and muscle weakness.   Gastrointestinal: Negative for abdominal pain, anorexia, constipation, diarrhea, hematochezia, melena, nausea and vomiting.   Neurological: Positive for dizziness (sometimes,). Negative for difficulty with concentration, disturbances in coordination, loss of balance, numbness, seizures, vertigo and weakness.   Psychiatric/Behavioral: Negative for altered mental status, depression, memory loss and substance abuse. The patient does not have insomnia and is not nervous/anxious.    Allergic/Immunologic: Positive for environmental allergies. Negative for persistent infections.       I have reviewed the following portions of the patient's history: allergies, current medications, past family history, past medical history, past social history, past surgical history, problem list and ROS and confirm it's accurate.    Allergies:  No Known Allergies    Medications:      Current Outpatient Medications:   •  amLODIPine (NORVASC) 5 MG tablet, Take 1 tablet by mouth Daily., Disp: 90 tablet, Rfl: 3  •  aspirin 81 MG tablet, Take 1 tablet by mouth Daily., Disp: 90 tablet, Rfl: 3  •  clopidogrel (Plavix) 75 MG tablet, Take 1 tablet by mouth Daily., Disp: 30 tablet, Rfl: 11  •  Coenzyme Q10 (Co Q-10) 100 MG capsule, Take 100 mg by mouth 2 (Two) Times a Day., Disp: 180 capsule, Rfl: 3  •  furosemide (LASIX) 20 MG tablet, Take 1 tablet by mouth Daily., Disp: 90 tablet, Rfl: 3  •  lisinopril (PRINIVIL,ZESTRIL) 40  MG tablet, Take 1 tablet by mouth Daily., Disp: 90 tablet, Rfl: 3  •  nicotine (NICODERM CQ) 14 MG/24HR patch, Place 1 patch on the skin as directed by provider Daily., Disp: 30 each, Rfl: 1  •  nitroglycerin (NITROSTAT) 0.4 MG SL tablet, Place 1 tablet under the tongue Every 5 (Five) Minutes As Needed for Chest Pain. Take no more than 3 doses in 15 minutes., Disp: 25 tablet, Rfl: 6  •  rosuvastatin (CRESTOR) 20 MG tablet, Take 1 tablet by mouth Daily., Disp: 90 tablet, Rfl: 3    History:   Past Medical History:   Diagnosis Date   • Carotid artery disease (HCC)    • COPD (chronic obstructive pulmonary disease) (HCC)    • Coronary artery disease    • COVID-19 vaccine administered     02/26/2021 , 03/26/2021 . 11/04/2021 - Moderna   • Hyperlipidemia    • Hypertension    • On home oxygen therapy     uses PRN and at HS       Past Surgical History:   Procedure Laterality Date   • CARDIAC CATHETERIZATION  2018    stents x 2 to RCA   • CAROTID ENDARTERECTOMY Right 5/17/2022    Procedure: CAROTID ENDARTERECTOMY WITH EEG RIGHT;  Surgeon: Shiraz Finn MD;  Location:  [facility];  Service: Vascular;  Laterality: Right;   • CAROTID ENDARTERECTOMY Left 8/4/2022    Procedure: CAROTID ENDARTERECTOMY WITH EEG LEFT;  Surgeon: Shiraz Finn MD;  Location:  [facility];  Service: Vascular;  Laterality: Left;  CLAMP ON: 0727  CLAMP OFF: 0745   • COLONOSCOPY W/ BIOPSIES     • CORONARY STENT PLACEMENT  01/20/2014    Stent x1 01/20/14   • ELBOW PROCEDURE Left     fracture repair   • HYSTERECTOMY         Social History     Socioeconomic History   • Marital status:    • Number of children: 2   Tobacco Use   • Smoking status: Current Every Day Smoker     Packs/day: 1.00     Years: 50.00     Pack years: 50.00     Types: Cigarettes   • Smokeless tobacco: Never Used   • Tobacco comment: Pt states that she is trying quit she has cut down her smoking to 1/2 ppd 8/15/22   Vaping Use   • Vaping Use: Never used   Substance and Sexual  "Activity   • Alcohol use: No   • Drug use: No   • Sexual activity: Defer        Family History   Problem Relation Age of Onset   • Hypertension Mother    • Heart disease Mother    • Diabetes Mother    • Heart attack Father        Objective     Physical Exam:  Vitals:    08/29/22 0945   BP: 128/76   Pulse: 88   Temp: 98.2 °F (36.8 °C)   SpO2: 97%   Weight: 78.5 kg (173 lb)   Height: 160 cm (63\")      Body mass index is 30.65 kg/m².    Physical Exam  Vitals and nursing note reviewed.   Constitutional:       Appearance: Normal appearance. She is normal weight.   HENT:      Head: Normocephalic and atraumatic.      Mouth/Throat:      Tongue: Tongue does not deviate from midline.   Eyes:      General: Lids are normal. Gaze aligned appropriately. No visual field deficit.     Pupils: Pupils are equal, round, and reactive to light.      Comments: No evidence of ptosis   Neck:      Vascular: Normal carotid pulses. No carotid bruit.      Trachea: Trachea normal.      Comments: Right CEA incision: well healing, no surrounding erythema, swelling, or warmth; no underlying hematoma present  Cardiovascular:      Rate and Rhythm: Normal rate and regular rhythm.      Pulses: Normal pulses.      Heart sounds: Normal heart sounds, S1 normal and S2 normal. No murmur heard.  Pulmonary:      Effort: Pulmonary effort is normal.      Breath sounds: Normal breath sounds.   Abdominal:      Palpations: Abdomen is soft.   Musculoskeletal:         General: Normal range of motion.      Cervical back: Normal range of motion and neck supple. No edema, erythema, rigidity or crepitus. Normal range of motion.   Skin:     General: Skin is warm and dry.      Capillary Refill: Capillary refill takes less than 2 seconds.   Neurological:      General: No focal deficit present.      Mental Status: She is alert and oriented to person, place, and time. Mental status is at baseline.      GCS: GCS eye subscore is 4. GCS verbal subscore is 5. GCS motor subscore " is 6.      Cranial Nerves: Cranial nerves are intact. No facial asymmetry.      Sensory: Sensation is intact.      Motor: Motor function is intact.      Coordination: Coordination is intact.      Gait: Gait is intact.      Comments: Tongue midline   No hoarseness, voice quality intact   Psychiatric:         Mood and Affect: Mood normal.         Behavior: Behavior normal.         Imaging/Labs:    EEG Monitoring Nonintracranial Surgery    Result Date: 8/4/2022  Uneventful EEG monitoring during left CEA This report is transcribed using the Dragon dictation system.      Chest X-Ray PA & Lateral    Result Date: 8/3/2022  No acute cardiopulmonary findings.  This report was finalized on 8/3/2022 3:34 PM by Eloy Leong MD.             Assessment / Plan      Assessment / Plan:  Diagnoses and all orders for this visit:    1. Carotid stenosis, asymptomatic, bilateral (Primary)    2. S/P carotid endarterectomy       1. Bilateral carotid artery stenosis s/p right carotid endarterectomy on 5/17/2022 by Dr. Finn:  Patient is now s/p left carotid endarterectomy with EEG on 8/4/2022 by Dr. Finn.  She been compliant with ASA/Plavix therapy.  Patient presents today for postoperative follow-up.  Patient has had hoarseness since surgery that she is frustrated is not improving.  Her incision is completely healed.  She denies any trouble swallowing or breathing.Pt denies hx of TIA or CVA since last visit, has had no new focal neurologic dysfunction, specifically vision changes or amaurosis fugax. Patient has no tongue deviation, asymmetrical smile or evidence of Arun syndrome. Advised patient that her voice hoarseness (could be s/t surgery or intubation) should improve but would take likely 2 to 3 months.  Offered appointment with patient in 6 to 8 weeks for reevaluation and possible referral to ENT at that time.  Patient did not want appointment and states she will contact her office if she has not had any improvement at  that time.  Otherwise, we will plan to see the patient back around June 2023 for yearly surveillance of her carotid artery disease.    Patient Education:  The BE FAST acronym helps you remember the signs and symptoms of a stroke: Balance loss, Eyesight loss, Facial dropping, Arm weakness, Speech difficulty, and Time to call 911    Follow Up:   Return in about 10 months (around 6/29/2023) for F/u with imaging.   Or sooner for any further concerns or worsening sign and symptoms. If unable to reach us in the office please dial 911 or go to the nearest emergency department.      Sanna SOSA  [facility] Cardiothoracic Surgery

## 2023-01-16 ENCOUNTER — TELEPHONE (OUTPATIENT)
Dept: CARDIOLOGY | Facility: CLINIC | Age: 78
End: 2023-01-16
Payer: MEDICARE

## 2023-01-16 DIAGNOSIS — R06.02 SHORTNESS OF BREATH: ICD-10-CM

## 2023-01-16 DIAGNOSIS — I25.119 CORONARY ARTERY DISEASE INVOLVING NATIVE CORONARY ARTERY OF NATIVE HEART WITH ANGINA PECTORIS: Chronic | ICD-10-CM

## 2023-01-16 DIAGNOSIS — E78.2 MIXED HYPERLIPIDEMIA: Chronic | ICD-10-CM

## 2023-01-16 DIAGNOSIS — R07.89 OTHER CHEST PAIN: Primary | ICD-10-CM

## 2023-01-16 DIAGNOSIS — R93.89 ABNORMAL CT OF THE CHEST: ICD-10-CM

## 2023-01-16 DIAGNOSIS — I10 PRIMARY HYPERTENSION: Chronic | ICD-10-CM

## 2023-01-16 DIAGNOSIS — F17.200 SMOKING: Chronic | ICD-10-CM

## 2023-01-16 NOTE — TELEPHONE ENCOUNTER
I ordered her a Lois.  They will call her to schedule.  Her last echo looked good.  Is her weight staying consistent?  Any increase in shortness of breath?   Per the pt her weight is staying consistent and is not fluctuating up or down.  She uses her oxygen while sleeping due to her COPD and it is difficult for her to differentiate between SOA and stress caused when speaking due to her vocal cord damage.  Pt aware Radha recommends a Lexiscan and Scheduling will call her to schedule this.  Pt will call our office with any additional issues or concerns.

## 2023-01-16 NOTE — TELEPHONE ENCOUNTER
Radha Flores, Marnie Wilkes RegSched Rep  Caller: Unspecified (Today, 11:31 AM)  Reviewed CT.  Patient has known CAD with stents.  Last testing 2018 as no complaints.  Any current issues?  What symptoms did she have before?  Does she want to repeat a stress test?  Looks like she had some fluid overload.  Has she been taking her lasix everyday?   How is her BP running?   Pt reports she had B CAROTID ENDARTERECTOMY since being seen in office.  Surgery was performed July 2022-Right carotid was 90% occluded then surgery performed 8/4/22-Left carotid was 70% occluded.  Post op she experienced Left side hemiplegia (numb).  The surgeon told her it may take up to a year for her voice to return.  She consulted with Dr. 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  He can perform a procedure to assist temporarily.  She is scheduled to f/u in 3 months.    Pt reports dizziness with activity and occasional chest pain/tightness that resolves on it's own.  Pt states she is agreeable to have a stress test if that will tell you anything.  Pt is taking her Lasix as ordered.  Pt denies monitoring her B/P and HR.  She states she will do anything you want her to do or recommend.

## 2023-01-16 NOTE — TELEPHONE ENCOUNTER
Pt LVM on the triage line reporting Dr. Goodrich ask her to verify Radha received her CT scan and sleep study to review for recommendations.  Sleep study is not in chart, the PM requested this from Dr. Goodrich's office.  CT scan is in the chart, date verified with Dr. Goodrich's office.

## 2023-02-06 DIAGNOSIS — R06.02 SHORTNESS OF BREATH: ICD-10-CM

## 2023-02-06 DIAGNOSIS — I25.119 CORONARY ARTERY DISEASE INVOLVING NATIVE CORONARY ARTERY OF NATIVE HEART WITH ANGINA PECTORIS: ICD-10-CM

## 2023-02-06 DIAGNOSIS — E78.2 MIXED HYPERLIPIDEMIA: ICD-10-CM

## 2023-02-06 DIAGNOSIS — I10 ESSENTIAL HYPERTENSION: ICD-10-CM

## 2023-02-06 RX ORDER — AMLODIPINE BESYLATE 5 MG/1
5 TABLET ORAL DAILY
Qty: 90 TABLET | Refills: 3 | Status: SHIPPED | OUTPATIENT
Start: 2023-02-06 | End: 2023-02-09 | Stop reason: SDUPTHER

## 2023-02-09 ENCOUNTER — TELEPHONE (OUTPATIENT)
Dept: CARDIOLOGY | Facility: CLINIC | Age: 78
End: 2023-02-09
Payer: MEDICARE

## 2023-02-09 ENCOUNTER — HOSPITAL ENCOUNTER (OUTPATIENT)
Dept: CARDIOLOGY | Facility: HOSPITAL | Age: 78
Discharge: HOME | End: 2023-02-09
Payer: MEDICARE

## 2023-02-09 DIAGNOSIS — R06.02 SHORTNESS OF BREATH: ICD-10-CM

## 2023-02-09 DIAGNOSIS — I10 PRIMARY HYPERTENSION: Chronic | ICD-10-CM

## 2023-02-09 DIAGNOSIS — I10 ESSENTIAL HYPERTENSION: ICD-10-CM

## 2023-02-09 DIAGNOSIS — I25.119 CORONARY ARTERY DISEASE INVOLVING NATIVE CORONARY ARTERY OF NATIVE HEART WITH ANGINA PECTORIS: ICD-10-CM

## 2023-02-09 DIAGNOSIS — R60.9 PERIPHERAL EDEMA: ICD-10-CM

## 2023-02-09 DIAGNOSIS — R07.89 OTHER CHEST PAIN: ICD-10-CM

## 2023-02-09 DIAGNOSIS — F17.200 SMOKING: Chronic | ICD-10-CM

## 2023-02-09 DIAGNOSIS — E78.2 MIXED HYPERLIPIDEMIA: ICD-10-CM

## 2023-02-09 DIAGNOSIS — R93.89 ABNORMAL CT OF THE CHEST: ICD-10-CM

## 2023-02-09 DIAGNOSIS — E78.2 MIXED HYPERLIPIDEMIA: Chronic | ICD-10-CM

## 2023-02-09 DIAGNOSIS — I25.119 CORONARY ARTERY DISEASE INVOLVING NATIVE CORONARY ARTERY OF NATIVE HEART WITH ANGINA PECTORIS: Chronic | ICD-10-CM

## 2023-02-09 PROCEDURE — 78452 HT MUSCLE IMAGE SPECT MULT: CPT | Performed by: INTERNAL MEDICINE

## 2023-02-09 PROCEDURE — 0 TECHNETIUM SESTAMIBI: Performed by: INTERNAL MEDICINE

## 2023-02-09 PROCEDURE — 78452 HT MUSCLE IMAGE SPECT MULT: CPT

## 2023-02-09 PROCEDURE — A9500 TC99M SESTAMIBI: HCPCS | Performed by: INTERNAL MEDICINE

## 2023-02-09 PROCEDURE — 25010000002 REGADENOSON 0.4 MG/5ML SOLUTION: Performed by: INTERNAL MEDICINE

## 2023-02-09 PROCEDURE — 93018 CV STRESS TEST I&R ONLY: CPT | Performed by: INTERNAL MEDICINE

## 2023-02-09 PROCEDURE — 93017 CV STRESS TEST TRACING ONLY: CPT

## 2023-02-09 RX ORDER — LISINOPRIL 40 MG/1
40 TABLET ORAL DAILY
Qty: 90 TABLET | Refills: 3 | Status: SHIPPED | OUTPATIENT
Start: 2023-02-09

## 2023-02-09 RX ORDER — FUROSEMIDE 20 MG/1
20 TABLET ORAL DAILY
Qty: 90 TABLET | Refills: 3 | Status: SHIPPED | OUTPATIENT
Start: 2023-02-09

## 2023-02-09 RX ORDER — AMLODIPINE BESYLATE 5 MG/1
5 TABLET ORAL DAILY
Qty: 90 TABLET | Refills: 3 | Status: SHIPPED | OUTPATIENT
Start: 2023-02-09

## 2023-02-09 RX ADMIN — TECHNETIUM TC 99M SESTAMIBI 1 DOSE: 1 INJECTION INTRAVENOUS at 13:50

## 2023-02-09 RX ADMIN — REGADENOSON 0.4 MG: 0.08 INJECTION, SOLUTION INTRAVENOUS at 15:10

## 2023-02-09 RX ADMIN — TECHNETIUM TC 99M SESTAMIBI 1 DOSE: 1 INJECTION INTRAVENOUS at 15:10

## 2023-02-09 NOTE — TELEPHONE ENCOUNTER
----- Message from Pita Rodriguez MA sent at 2/9/2023  3:02 PM EST -----  Patient walked in this afternoon requesting refills sent into [facility] in [place]. Thank you!   She is requesting the following refills:     Furosemide 20MG    Amlodipine 5MG    Lisinopril 40MG

## 2023-02-11 LAB
BH CV REST NUCLEAR ISOTOPE DOSE: 10 MCI
BH CV STRESS COMMENTS STAGE 1: NORMAL
BH CV STRESS DOSE REGADENOSON STAGE 1: 0.4
BH CV STRESS DURATION MIN STAGE 1: 0
BH CV STRESS DURATION SEC STAGE 1: 10
BH CV STRESS NUCLEAR ISOTOPE DOSE: 30 MCI
BH CV STRESS PROTOCOL 1: NORMAL
BH CV STRESS RECOVERY BP: NORMAL MMHG
BH CV STRESS RECOVERY HR: 78 BPM
BH CV STRESS STAGE 1: 1
MAXIMAL PREDICTED HEART RATE: 143 BPM
PERCENT MAX PREDICTED HR: 59.44 %
STRESS BASELINE BP: NORMAL MMHG
STRESS BASELINE HR: 75 BPM
STRESS PERCENT HR: 70 %
STRESS POST PEAK BP: NORMAL MMHG
STRESS POST PEAK HR: 85 BPM
STRESS TARGET HR: 122 BPM

## 2023-02-17 ENCOUNTER — TELEPHONE (OUTPATIENT)
Dept: CARDIOLOGY | Facility: CLINIC | Age: 78
End: 2023-02-17
Payer: MEDICARE

## 2023-02-17 NOTE — TELEPHONE ENCOUNTER
STRESS  Pt notified of no acute findings. Provider will discuss results at f/u. Pt reminded of appt date and time.  ----- Message from SHEILA Thomson sent at 2/12/2023  1:20 PM EST -----  Regarding: FW:  Please advise patient  ----- Message -----  From: Shiraz Byrne MD  Sent: 2/11/2023   7:55 PM EST  To: SHEILA Thomson

## 2023-03-22 DIAGNOSIS — I25.119 CORONARY ARTERY DISEASE INVOLVING NATIVE CORONARY ARTERY OF NATIVE HEART WITH ANGINA PECTORIS: ICD-10-CM

## 2023-03-22 DIAGNOSIS — E78.2 MIXED HYPERLIPIDEMIA: ICD-10-CM

## 2023-03-22 DIAGNOSIS — R06.02 SHORTNESS OF BREATH: ICD-10-CM

## 2023-03-22 RX ORDER — ROSUVASTATIN CALCIUM 20 MG/1
20 TABLET, COATED ORAL DAILY
Qty: 90 TABLET | Refills: 3 | Status: SHIPPED | OUTPATIENT
Start: 2023-03-22

## 2023-03-22 NOTE — TELEPHONE ENCOUNTER
"    Caller: Cassy Olvera \"Carmen\"    Relationship: Self    Best call back number: [phone]    Requested Prescriptions:   Requested Prescriptions     Pending Prescriptions Disp Refills   • rosuvastatin (CRESTOR) 20 MG tablet 90 tablet 3     Sig: Take 1 tablet by mouth Daily.        [id number] [address] [facility] [address] [facility] [phone] [facility] [phone]      Last office visit with prescribing clinician: Visit date not found   Last telemedicine visit with prescribing clinician: 5/23/2023   Next office visit with prescribing clinician: 5/23/2023     Does the patient have less than a 3 day supply:  [x] Yes  [] No      Barak Jhaveri Rep   03/22/23 13:00 EDT       "

## 2023-05-04 DIAGNOSIS — R42 DIZZINESS: Primary | ICD-10-CM

## 2023-05-04 DIAGNOSIS — I65.23 BILATERAL CAROTID ARTERY STENOSIS: ICD-10-CM

## 2023-05-16 RX ORDER — CLOPIDOGREL BISULFATE 75 MG/1
75 TABLET ORAL DAILY
Qty: 30 TABLET | Refills: 11 | Status: SHIPPED | OUTPATIENT
Start: 2023-05-16 | End: 2024-05-15

## 2023-05-23 ENCOUNTER — OFFICE VISIT (OUTPATIENT)
Dept: CARDIOLOGY | Facility: CLINIC | Age: 78
End: 2023-05-23
Payer: MEDICARE

## 2023-05-23 VITALS
HEIGHT: 63 IN | BODY MASS INDEX: 28.39 KG/M2 | SYSTOLIC BLOOD PRESSURE: 147 MMHG | WEIGHT: 160.2 LBS | OXYGEN SATURATION: 91 % | HEART RATE: 73 BPM | DIASTOLIC BLOOD PRESSURE: 81 MMHG

## 2023-05-23 DIAGNOSIS — I10 ESSENTIAL HYPERTENSION: ICD-10-CM

## 2023-05-23 DIAGNOSIS — E78.2 MIXED HYPERLIPIDEMIA: ICD-10-CM

## 2023-05-23 DIAGNOSIS — I25.119 CORONARY ARTERY DISEASE INVOLVING NATIVE CORONARY ARTERY OF NATIVE HEART WITH ANGINA PECTORIS: ICD-10-CM

## 2023-05-23 DIAGNOSIS — R60.9 PERIPHERAL EDEMA: ICD-10-CM

## 2023-05-23 DIAGNOSIS — R06.02 SHORTNESS OF BREATH: ICD-10-CM

## 2023-05-23 PROCEDURE — 3079F DIAST BP 80-89 MM HG: CPT | Performed by: PHYSICIAN ASSISTANT

## 2023-05-23 PROCEDURE — 99214 OFFICE O/P EST MOD 30 MIN: CPT | Performed by: PHYSICIAN ASSISTANT

## 2023-05-23 PROCEDURE — 3077F SYST BP >= 140 MM HG: CPT | Performed by: PHYSICIAN ASSISTANT

## 2023-05-23 RX ORDER — FUROSEMIDE 20 MG/1
20 TABLET ORAL DAILY
Qty: 90 TABLET | Refills: 3 | Status: SHIPPED | OUTPATIENT
Start: 2023-05-23

## 2023-05-23 RX ORDER — CLOPIDOGREL BISULFATE 75 MG/1
75 TABLET ORAL DAILY
Qty: 90 TABLET | Refills: 3 | Status: SHIPPED | OUTPATIENT
Start: 2023-05-23 | End: 2023-05-23

## 2023-05-23 RX ORDER — FUROSEMIDE 20 MG/1
20 TABLET ORAL DAILY
Qty: 90 TABLET | Refills: 3 | Status: SHIPPED | OUTPATIENT
Start: 2023-05-23 | End: 2023-05-23 | Stop reason: SDUPTHER

## 2023-05-23 RX ORDER — ISOSORBIDE MONONITRATE 30 MG/1
30 TABLET, EXTENDED RELEASE ORAL EVERY MORNING
Qty: 90 TABLET | Refills: 3 | Status: SHIPPED | OUTPATIENT
Start: 2023-05-23

## 2023-05-23 RX ORDER — CLOPIDOGREL BISULFATE 75 MG/1
75 TABLET ORAL DAILY
Qty: 90 TABLET | Refills: 3 | Status: SHIPPED | OUTPATIENT
Start: 2023-05-23 | End: 2023-05-23 | Stop reason: SDUPTHER

## 2023-05-23 RX ORDER — AMLODIPINE BESYLATE 5 MG/1
5 TABLET ORAL DAILY
Qty: 90 TABLET | Refills: 3 | Status: SHIPPED | OUTPATIENT
Start: 2023-05-23 | End: 2023-05-23 | Stop reason: SDUPTHER

## 2023-05-23 RX ORDER — LISINOPRIL 40 MG/1
40 TABLET ORAL DAILY
Qty: 90 TABLET | Refills: 3 | Status: SHIPPED | OUTPATIENT
Start: 2023-05-23

## 2023-05-23 RX ORDER — ROSUVASTATIN CALCIUM 20 MG/1
20 TABLET, COATED ORAL DAILY
Qty: 90 TABLET | Refills: 3 | Status: SHIPPED | OUTPATIENT
Start: 2023-05-23

## 2023-05-23 RX ORDER — AMLODIPINE BESYLATE 5 MG/1
5 TABLET ORAL DAILY
Qty: 90 TABLET | Refills: 3 | Status: SHIPPED | OUTPATIENT
Start: 2023-05-23

## 2023-05-23 NOTE — PROGRESS NOTES
"Problem list     Subjective   Cassy SARA Olvera is a 77 y.o. female     Chief Complaint   Patient presents with   • Follow-up     6 MTH F/U    PROBLEM LIST:      1. CAD.  1.1 Cardiac Cath. \"Failed\" PCI of the RCA. [facility]. Winter 2014- Intraprocedural closure of vessel. ILUMIEN-1 stent mid-LAD, 01/20/2014. [place], [place].  1.2 Nuclear stress test 8-7-18 with lateral wall ischemia  1.3 left heart catheter 11/16/18-2 stents to the right coronary artery, 30-50% ostial narrowing of the circumflex, EF 50-55%, left ventricular end of blood pressure 14  2. Shortness of Breath.  2.1 Echo 8-7-18 with Ef > 65%, mild DD, trace MR, trace TR, pulm. Pressures 30-35 mmHg, no pericardial effusion  2.2 Echo 2/16/2022-EF 60± percent, mild LVH, diastolic dysfunction 1, trivial MR and trivial to mild TR, PA in the high 30s  3. Hyperlipidemia.   4.  Smoking habituation  5.  PVD  5.1 bilateral lower extremity arterial ultrasound 2/4/2020-diffuse arthrosis in both common femoral arteries without obstruction, stimulate mild atherosclerotic involvement of the superficial femoral arteries without stenosis, popliteal arteries are widely patent without aneurysmal dilation, distal vessels are patent, nonobstructive disease  6.  COPD, Dr. Goodrich  7.  Carotid artery disease with right carotid endarterectomy   7.2 bilateral carotid endarterectomies in 2022 by Dr. Aakash SMITH    Patient is a 77-year-old female who presents to the office to be evaluated.  Patient can occasionally have chest discomfort.  She will notice a slight substernal discomfort similar to her previous angina but only mild at this point.  She does not describe taking nitroglycerin.  Her dyspnea is mild.  She does not describe progressive shortness of breath.  No PND orthopnea.    She can occasionally palpitate.  She does not describe any symptoms of dizziness, presyncope, or syncope.  She does not describe any numbness or dysesthesias.  She does not describe any " dysarthria etc.  No complaints of any strokelike symptoms.    She is stable otherwise.  Current Outpatient Medications on File Prior to Visit   Medication Sig Dispense Refill   • aspirin 81 MG tablet Take 1 tablet by mouth Daily. 90 tablet 3   • Coenzyme Q10 (Co Q-10) 100 MG capsule Take 100 mg by mouth 2 (Two) Times a Day. 180 capsule 3   • nicotine (NICODERM CQ) 14 MG/24HR patch Place 1 patch on the skin as directed by provider Daily. 30 each 1   • nitroglycerin (NITROSTAT) 0.4 MG SL tablet Place 1 tablet under the tongue Every 5 (Five) Minutes As Needed for Chest Pain. Take no more than 3 doses in 15 minutes. 25 tablet 6     No current facility-administered medications on file prior to visit.       Patient has no known allergies.    Past Medical History:   Diagnosis Date   • Carotid artery disease    • COPD (chronic obstructive pulmonary disease)    • Coronary artery disease    • COVID-19 vaccine administered     02/26/2021 , 03/26/2021 . 11/04/2021 - Moderna   • Hyperlipidemia    • Hypertension    • On home oxygen therapy     uses PRN and at HS       Social History     Socioeconomic History   • Marital status:    • Number of children: 2   Tobacco Use   • Smoking status: Every Day     Packs/day: 1.00     Years: 50.00     Pack years: 50.00     Types: Cigarettes   • Smokeless tobacco: Never   • Tobacco comments:     Pt states that she is trying quit she has cut down her smoking to 1/2 ppd 8/15/22   Vaping Use   • Vaping Use: Never used   Substance and Sexual Activity   • Alcohol use: No   • Drug use: No   • Sexual activity: Defer       Family History   Problem Relation Age of Onset   • Hypertension Mother    • Heart disease Mother    • Diabetes Mother    • Heart attack Father        Review of Systems   Constitutional: Negative for appetite change, chills and fever.   HENT: Negative.  Negative for ear discharge, facial swelling, mouth sores, rhinorrhea, sinus pain, sneezing and tinnitus.    Eyes: Negative for  "photophobia, pain, redness, itching and visual disturbance.   Respiratory: Positive for cough, choking and shortness of breath. Negative for wheezing.    Cardiovascular: Positive for chest pain. Negative for palpitations and leg swelling.   Gastrointestinal: Negative for blood in stool, constipation and diarrhea.   Genitourinary: Negative.  Negative for difficulty urinating.   Musculoskeletal: Negative for arthralgias, back pain and neck pain.   Skin: Negative for rash and wound.   Neurological: Positive for numbness. Negative for dizziness and weakness.   Psychiatric/Behavioral: Negative for sleep disturbance.       Objective   Vitals:    05/23/23 1435   BP: 147/81   Pulse: 73   SpO2: 91%   Weight: 72.7 kg (160 lb 3.2 oz)   Height: 160 cm (62.99\")      /81   Pulse 73   Ht 160 cm (62.99\")   Wt 72.7 kg (160 lb 3.2 oz)   SpO2 91%   BMI 28.39 kg/m²     Lab Results (most recent)     None          Physical Exam  Vitals and nursing note reviewed.   Constitutional:       General: She is not in acute distress.     Appearance: Normal appearance. She is well-developed.   HENT:      Head: Normocephalic and atraumatic.   Eyes:      General: No scleral icterus.        Right eye: No discharge.         Left eye: No discharge.      Conjunctiva/sclera: Conjunctivae normal.   Neck:      Vascular: No carotid bruit.   Cardiovascular:      Rate and Rhythm: Normal rate and regular rhythm.      Heart sounds: Normal heart sounds. No murmur heard.    No friction rub. No gallop.   Pulmonary:      Effort: Pulmonary effort is normal. No respiratory distress.      Breath sounds: Normal breath sounds. No wheezing or rales.   Chest:      Chest wall: No tenderness.   Musculoskeletal:      Right lower leg: No edema.      Left lower leg: No edema.   Skin:     General: Skin is warm and dry.      Coloration: Skin is not pale.      Findings: No erythema or rash.   Neurological:      Mental Status: She is alert and oriented to person, place, " and time.      Cranial Nerves: No cranial nerve deficit.   Psychiatric:         Behavior: Behavior normal.         Procedure   Procedures       Assessment & Plan     Problems Addressed this Visit        Cardiac and Vasculature    CAD prior RCA stents x2 (Chronic)    Relevant Medications    amLODIPine (NORVASC) 5 MG tablet    isosorbide mononitrate (IMDUR) 30 MG 24 hr tablet    Mixed hyperlipidemia (Chronic)    Relevant Medications    amLODIPine (NORVASC) 5 MG tablet       Pulmonary and Pneumonias    Shortness of breath    Relevant Medications    amLODIPine (NORVASC) 5 MG tablet       Symptoms and Signs    Peripheral edema    Relevant Medications    furosemide (LASIX) 20 MG tablet   Other Visit Diagnoses     Essential hypertension        Relevant Medications    amLODIPine (NORVASC) 5 MG tablet    furosemide (LASIX) 20 MG tablet      Diagnoses       Codes Comments    Essential hypertension     ICD-10-CM: I10  ICD-9-CM: 401.9     Coronary artery disease involving native coronary artery of native heart with angina pectoris     ICD-10-CM: I25.119  ICD-9-CM: 414.01, 413.9     Mixed hyperlipidemia     ICD-10-CM: E78.2  ICD-9-CM: 272.2     Shortness of breath     ICD-10-CM: R06.02  ICD-9-CM: 786.05     Peripheral edema     ICD-10-CM: R60.9  ICD-9-CM: 782.3           Recommendation  1.  Patient is a 77-year-old female who presents back for follow-up who has history of coronary disease.  She has chest discomfort, albeit not severe, and that is concerning.  It apparently is similar to her previous angina but stable.  However, I did discuss testing to include stress testing for an ischemia assessment but she is not interested.  She has nitroglycerin available for chest pain as needed.  I am placing her on long-acting nitrates to help with symptoms as well.  Any chest pain, not resolved by nitroglycerin, recommend ER evaluation.    2.  Patient with lower extremity edema.  I am decreasing her amlodipine to 2.5 mg.  She will  continue Lasix therapy at 20 mg.  We will monitor for now.    3.  Patient with dyslipidemia on statin therapy and labs were followed by PCP.  For now, she wants to be managed medically.  If symptoms were to change or worsen, she was instructed to call the office.  She is to follow with primary as scheduled.           Cassy Queencutt  reports that she has been smoking cigarettes. She has a 50.00 pack-year smoking history. She has never used smokeless tobacco..                 Advance Care Planning   ACP discussion was declined by the patient. Patient does not have an advance directive, declines further assistance.     Electronically signed by:

## 2023-05-23 NOTE — TELEPHONE ENCOUNTER
Refill requested by pt, states the pharmacy wont fill them due to betsy's name being on the refill request.

## 2023-05-23 NOTE — LETTER
"May 24, 2023     Baltazar Joseph MD  [address] [place] [id number]    Patient: Cassy Olvera   YOB: 1945   Date of Visit: 5/23/2023       Dear Baltazar Joseph MD    Cassy Olvera was in my office today. Below is a copy of my note.    If you have questions, please do not hesitate to call me. I look forward to following Cassy along with you.         Sincerely,        EZEQUIEL Arroyo        CC: No Recipients    Problem list     Subjective    Cassy Olvera is a 77 y.o. female     Chief Complaint   Patient presents with   • Follow-up     6 MTH F/U    PROBLEM LIST:      1. CAD.  1.1 Cardiac Cath. \"Failed\" PCI of the RCA. [facility]. Winter 2014- Intraprocedural closure of vessel. ILUMIEN-1 stent mid-LAD, 01/20/2014. [place], [place].  1.2 Nuclear stress test 8-7-18 with lateral wall ischemia  1.3 left heart catheter 11/16/18-2 stents to the right coronary artery, 30-50% ostial narrowing of the circumflex, EF 50-55%, left ventricular end of blood pressure 14  2. Shortness of Breath.  2.1 Echo 8-7-18 with Ef > 65%, mild DD, trace MR, trace TR, pulm. Pressures 30-35 mmHg, no pericardial effusion  2.2 Echo 2/16/2022-EF 60± percent, mild LVH, diastolic dysfunction 1, trivial MR and trivial to mild TR, PA in the high 30s  3. Hyperlipidemia.   4.  Smoking habituation  5.  PVD  5.1 bilateral lower extremity arterial ultrasound 2/4/2020-diffuse arthrosis in both common femoral arteries without obstruction, stimulate mild atherosclerotic involvement of the superficial femoral arteries without stenosis, popliteal arteries are widely patent without aneurysmal dilation, distal vessels are patent, nonobstructive disease  6.  COPD, Dr. Goodrich  7.  Carotid artery disease with right carotid endarterectomy   7.2 bilateral carotid endarterectomies in 2022 by Dr. Aakash SMITH    Patient is a 77-year-old female who presents to the office to be evaluated.  Patient can occasionally have " chest discomfort.  She will notice a slight substernal discomfort similar to her previous angina but only mild at this point.  She does not describe taking nitroglycerin.  Her dyspnea is mild.  She does not describe progressive shortness of breath.  No PND orthopnea.    She can occasionally palpitate.  She does not describe any symptoms of dizziness, presyncope, or syncope.  She does not describe any numbness or dysesthesias.  She does not describe any dysarthria etc.  No complaints of any strokelike symptoms.    She is stable otherwise.  Current Outpatient Medications on File Prior to Visit   Medication Sig Dispense Refill   • aspirin 81 MG tablet Take 1 tablet by mouth Daily. 90 tablet 3   • Coenzyme Q10 (Co Q-10) 100 MG capsule Take 100 mg by mouth 2 (Two) Times a Day. 180 capsule 3   • nicotine (NICODERM CQ) 14 MG/24HR patch Place 1 patch on the skin as directed by provider Daily. 30 each 1   • nitroglycerin (NITROSTAT) 0.4 MG SL tablet Place 1 tablet under the tongue Every 5 (Five) Minutes As Needed for Chest Pain. Take no more than 3 doses in 15 minutes. 25 tablet 6     No current facility-administered medications on file prior to visit.       Patient has no known allergies.    Past Medical History:   Diagnosis Date   • Carotid artery disease    • COPD (chronic obstructive pulmonary disease)    • Coronary artery disease    • COVID-19 vaccine administered     02/26/2021 , 03/26/2021 . 11/04/2021 - Moderna   • Hyperlipidemia    • Hypertension    • On home oxygen therapy     uses PRN and at HS       Social History     Socioeconomic History   • Marital status:    • Number of children: 2   Tobacco Use   • Smoking status: Every Day     Packs/day: 1.00     Years: 50.00     Pack years: 50.00     Types: Cigarettes   • Smokeless tobacco: Never   • Tobacco comments:     Pt states that she is trying quit she has cut down her smoking to 1/2 ppd 8/15/22   Vaping Use   • Vaping Use: Never used   Substance and Sexual  "Activity   • Alcohol use: No   • Drug use: No   • Sexual activity: Defer       Family History   Problem Relation Age of Onset   • Hypertension Mother    • Heart disease Mother    • Diabetes Mother    • Heart attack Father        Review of Systems   Constitutional: Negative for appetite change, chills and fever.   HENT: Negative.  Negative for ear discharge, facial swelling, mouth sores, rhinorrhea, sinus pain, sneezing and tinnitus.    Eyes: Negative for photophobia, pain, redness, itching and visual disturbance.   Respiratory: Positive for cough, choking and shortness of breath. Negative for wheezing.    Cardiovascular: Positive for chest pain. Negative for palpitations and leg swelling.   Gastrointestinal: Negative for blood in stool, constipation and diarrhea.   Genitourinary: Negative.  Negative for difficulty urinating.   Musculoskeletal: Negative for arthralgias, back pain and neck pain.   Skin: Negative for rash and wound.   Neurological: Positive for numbness. Negative for dizziness and weakness.   Psychiatric/Behavioral: Negative for sleep disturbance.       Objective    Vitals:    05/23/23 1435   BP: 147/81   Pulse: 73   SpO2: 91%   Weight: 72.7 kg (160 lb 3.2 oz)   Height: 160 cm (62.99\")      /81   Pulse 73   Ht 160 cm (62.99\")   Wt 72.7 kg (160 lb 3.2 oz)   SpO2 91%   BMI 28.39 kg/m²     Lab Results (most recent)       None            Physical Exam  Vitals and nursing note reviewed.   Constitutional:       General: She is not in acute distress.     Appearance: Normal appearance. She is well-developed.   HENT:      Head: Normocephalic and atraumatic.   Eyes:      General: No scleral icterus.        Right eye: No discharge.         Left eye: No discharge.      Conjunctiva/sclera: Conjunctivae normal.   Neck:      Vascular: No carotid bruit.   Cardiovascular:      Rate and Rhythm: Normal rate and regular rhythm.      Heart sounds: Normal heart sounds. No murmur heard.    No friction rub. No " gallop.   Pulmonary:      Effort: Pulmonary effort is normal. No respiratory distress.      Breath sounds: Normal breath sounds. No wheezing or rales.   Chest:      Chest wall: No tenderness.   Musculoskeletal:      Right lower leg: No edema.      Left lower leg: No edema.   Skin:     General: Skin is warm and dry.      Coloration: Skin is not pale.      Findings: No erythema or rash.   Neurological:      Mental Status: She is alert and oriented to person, place, and time.      Cranial Nerves: No cranial nerve deficit.   Psychiatric:         Behavior: Behavior normal.         Procedure    Procedures      Assessment & Plan     Problems Addressed this Visit          Cardiac and Vasculature    CAD prior RCA stents x2 (Chronic)    Relevant Medications    amLODIPine (NORVASC) 5 MG tablet    isosorbide mononitrate (IMDUR) 30 MG 24 hr tablet    Mixed hyperlipidemia (Chronic)    Relevant Medications    amLODIPine (NORVASC) 5 MG tablet       Pulmonary and Pneumonias    Shortness of breath    Relevant Medications    amLODIPine (NORVASC) 5 MG tablet       Symptoms and Signs    Peripheral edema    Relevant Medications    furosemide (LASIX) 20 MG tablet     Other Visit Diagnoses       Essential hypertension        Relevant Medications    amLODIPine (NORVASC) 5 MG tablet    furosemide (LASIX) 20 MG tablet          Diagnoses         Codes Comments    Essential hypertension     ICD-10-CM: I10  ICD-9-CM: 401.9     Coronary artery disease involving native coronary artery of native heart with angina pectoris     ICD-10-CM: I25.119  ICD-9-CM: 414.01, 413.9     Mixed hyperlipidemia     ICD-10-CM: E78.2  ICD-9-CM: 272.2     Shortness of breath     ICD-10-CM: R06.02  ICD-9-CM: 786.05     Peripheral edema     ICD-10-CM: R60.9  ICD-9-CM: 782.3             Recommendation  1.  Patient is a 77-year-old female who presents back for follow-up who has history of coronary disease.  She has chest discomfort, albeit not severe, and that is  concerning.  It apparently is similar to her previous angina but stable.  However, I did discuss testing to include stress testing for an ischemia assessment but she is not interested.  She has nitroglycerin available for chest pain as needed.  I am placing her on long-acting nitrates to help with symptoms as well.  Any chest pain, not resolved by nitroglycerin, recommend ER evaluation.    2.  Patient with lower extremity edema.  I am decreasing her amlodipine to 2.5 mg.  She will continue Lasix therapy at 20 mg.  We will monitor for now.    3.  Patient with dyslipidemia on statin therapy and labs were followed by PCP.  For now, she wants to be managed medically.  If symptoms were to change or worsen, she was instructed to call the office.  She is to follow with primary as scheduled.          Cassy Olvera  reports that she has been smoking cigarettes. She has a 50.00 pack-year smoking history. She has never used smokeless tobacco..                 Advance Care Planning   ACP discussion was declined by the patient. Patient does not have an advance directive, declines further assistance.    Electronically signed by:

## 2023-06-29 PROBLEM — R42 DIZZINESS: Status: RESOLVED | Noted: 2022-01-14 | Resolved: 2023-06-29

## 2023-06-29 PROBLEM — I65.22 LEFT CAROTID STENOSIS: Status: RESOLVED | Noted: 2022-05-09 | Resolved: 2023-06-29

## 2023-12-21 ENCOUNTER — OFFICE VISIT (OUTPATIENT)
Dept: CARDIOLOGY | Facility: CLINIC | Age: 78
End: 2023-12-21
Payer: MEDICARE

## 2023-12-21 VITALS
HEART RATE: 77 BPM | DIASTOLIC BLOOD PRESSURE: 63 MMHG | HEIGHT: 62 IN | WEIGHT: 171 LBS | SYSTOLIC BLOOD PRESSURE: 108 MMHG | OXYGEN SATURATION: 94 % | BODY MASS INDEX: 31.47 KG/M2

## 2023-12-21 DIAGNOSIS — R06.02 SHORTNESS OF BREATH: ICD-10-CM

## 2023-12-21 DIAGNOSIS — I25.119 CORONARY ARTERY DISEASE INVOLVING NATIVE CORONARY ARTERY OF NATIVE HEART WITH ANGINA PECTORIS: Primary | ICD-10-CM

## 2023-12-21 DIAGNOSIS — R07.9 CHEST PAIN, UNSPECIFIED TYPE: ICD-10-CM

## 2023-12-21 PROCEDURE — 3074F SYST BP LT 130 MM HG: CPT | Performed by: PHYSICIAN ASSISTANT

## 2023-12-21 PROCEDURE — 99214 OFFICE O/P EST MOD 30 MIN: CPT | Performed by: PHYSICIAN ASSISTANT

## 2023-12-21 PROCEDURE — 3078F DIAST BP <80 MM HG: CPT | Performed by: PHYSICIAN ASSISTANT

## 2023-12-21 NOTE — LETTER
"December 21, 2023       No Recipients    Patient: Cassy Olvera   YOB: 1945   Date of Visit: 12/21/2023       Dear Baltazar Joseph MD    Cassy Olvera was in my office today. Below is a copy of my note.    If you have questions, please do not hesitate to call me. I look forward to following Cassy along with you.         Sincerely,        EZEQUIEL Arroyo        CC:   No Recipients    Problem list     Subjective  Cassy Olvera is a 78 y.o. female     Chief Complaint   Patient presents with   • Follow-up     6 months   PROBLEM LIST:      1. CAD.  1.1 Cardiac Cath. \"Failed\" PCI of the RCA. [facility]. Winter 2014- Intraprocedural closure of vessel. ILUMIEN-1 stent mid-LAD, 01/20/2014. [place], [place].  1.2 Nuclear stress test 8-7-18 with lateral wall ischemia  1.3 left heart catheter 11/16/18-2 stents to the right coronary artery, 30-50% ostial narrowing of the circumflex, EF 50-55%, left ventricular end of blood pressure 14  1.4 stress test February 2023 with no evidence of ischemia and preserved LV function  2. Shortness of Breath.  2.1 Echo 8-7-18 with Ef > 65%, mild DD, trace MR, trace TR, pulm. Pressures 30-35 mmHg, no pericardial effusion  2.2 Echo 2/16/2022-EF 60± percent, mild LVH, diastolic dysfunction 1, trivial MR and trivial to mild TR, PA in the high 30s  3. Hyperlipidemia.   4.  Smoking habituation  5.  PVD  5.1 bilateral lower extremity arterial ultrasound 2/4/2020-diffuse arthrosis in both common femoral arteries without obstruction, stimulate mild atherosclerotic involvement of the superficial femoral arteries without stenosis, popliteal arteries are widely patent without aneurysmal dilation, distal vessels are patent, nonobstructive disease  6.  COPD, Dr. Goodrich  7.  Carotid artery disease with right carotid endarterectomy   7.2 bilateral carotid endarterectomies in 2022 by Dr. Aakash SMITH    Patient is a 78-year-old female who was back to the office for " follow-up.  Patient complains of chest discomfort.  Patient had stress test earlier this year but continues to feel pressure.  It has mildly progressed since her last visit.  She has felt this intermittently despite medical therapy.  He seems to occur at rest.    Patient has dyspnea when exerting or trying to do activity.  Patient does not describe progressive symptoms.  No PND or orthopnea.    Patient does not describe palpitating nor does patient complain of dysrhythmic symptoms.  She is stable otherwise.      Current Outpatient Medications on File Prior to Visit   Medication Sig Dispense Refill   • amLODIPine (NORVASC) 5 MG tablet Take 1 tablet by mouth Daily. 90 tablet 3   • aspirin 81 MG tablet Take 1 tablet by mouth Daily. 90 tablet 3   • Coenzyme Q10 (Co Q-10) 100 MG capsule Take 100 mg by mouth 2 (Two) Times a Day. (Patient taking differently: Take 100 mg by mouth 2 (Two) Times a Day. Every other day) 180 capsule 3   • furosemide (LASIX) 20 MG tablet Take 1 tablet by mouth Daily. 90 tablet 3   • isosorbide mononitrate (IMDUR) 30 MG 24 hr tablet Take 1 tablet by mouth Every Morning. 90 tablet 3   • lisinopril (PRINIVIL,ZESTRIL) 40 MG tablet Take 1 tablet by mouth Daily. 90 tablet 3   • nitroglycerin (NITROSTAT) 0.4 MG SL tablet Place 1 tablet under the tongue Every 5 (Five) Minutes As Needed for Chest Pain. Take no more than 3 doses in 15 minutes. 25 tablet 6   • rosuvastatin (CRESTOR) 20 MG tablet Take 1 tablet by mouth Daily. 90 tablet 3     No current facility-administered medications on file prior to visit.       Patient has no known allergies.    Past Medical History:   Diagnosis Date   • Carotid artery disease    • COPD (chronic obstructive pulmonary disease)    • Coronary artery disease    • COVID-19 vaccine administered     02/26/2021 , 03/26/2021 . 11/04/2021 - Moderna   • Dizziness 01/14/2022   • Hyperlipidemia    • Hypertension    • On home oxygen therapy     uses PRN and at HS       Social History  "    Socioeconomic History   • Marital status:    • Number of children: 2   Tobacco Use   • Smoking status: Every Day     Packs/day: 1.00     Years: 50.00     Additional pack years: 0.00     Total pack years: 50.00     Types: Cigarettes   • Smokeless tobacco: Never   • Tobacco comments:     Pt states that she is trying quit she has cut down her smoking to 1/2 ppd 8/15/22   Vaping Use   • Vaping Use: Never used   Substance and Sexual Activity   • Alcohol use: No   • Drug use: No   • Sexual activity: Defer       Family History   Problem Relation Age of Onset   • Hypertension Mother    • Heart disease Mother    • Diabetes Mother    • Heart attack Father        Review of Systems   Constitutional:  Positive for fatigue. Negative for activity change, appetite change, chills and fever.   HENT: Negative.     Eyes: Negative.  Negative for visual disturbance.   Respiratory:  Positive for cough and shortness of breath. Negative for apnea, chest tightness and wheezing.    Cardiovascular:  Positive for chest pain. Negative for palpitations and leg swelling.   Gastrointestinal: Negative.  Negative for blood in stool.   Endocrine: Negative.  Negative for cold intolerance and heat intolerance.   Genitourinary:  Negative for hematuria.   Musculoskeletal: Negative.  Negative for arthralgias, back pain and gait problem.   Skin: Negative.  Negative for color change, rash and wound.   Allergic/Immunologic: Negative.  Negative for environmental allergies and food allergies.   Neurological:  Negative for dizziness, syncope, weakness, light-headedness, numbness and headaches.   Hematological: Negative.  Does not bruise/bleed easily.   Psychiatric/Behavioral: Negative.  Negative for sleep disturbance.        Objective  Vitals:    12/21/23 1315   BP: 108/63   BP Location: Left arm   Patient Position: Sitting   Cuff Size: Adult   Pulse: 77   SpO2: 94%   Weight: 77.6 kg (171 lb)   Height: 157.5 cm (62\")      /63 (BP Location: Left " "arm, Patient Position: Sitting, Cuff Size: Adult)   Pulse 77   Ht 157.5 cm (62\")   Wt 77.6 kg (171 lb)   SpO2 94%   BMI 31.28 kg/m²     Lab Results (most recent)       None            Physical Exam  Vitals and nursing note reviewed.   Constitutional:       General: She is not in acute distress.     Appearance: Normal appearance. She is well-developed.   HENT:      Head: Normocephalic and atraumatic.   Eyes:      General: No scleral icterus.        Right eye: No discharge.         Left eye: No discharge.      Conjunctiva/sclera: Conjunctivae normal.   Neck:      Vascular: No carotid bruit.   Cardiovascular:      Rate and Rhythm: Normal rate and regular rhythm.      Heart sounds: Normal heart sounds. No murmur heard.     No friction rub. No gallop.   Pulmonary:      Effort: Pulmonary effort is normal. No respiratory distress.      Breath sounds: Normal breath sounds. No wheezing or rales.   Chest:      Chest wall: No tenderness.   Musculoskeletal:      Right lower leg: No edema.      Left lower leg: No edema.   Skin:     General: Skin is warm and dry.      Coloration: Skin is not pale.      Findings: No erythema or rash.   Neurological:      Mental Status: She is alert and oriented to person, place, and time.      Cranial Nerves: No cranial nerve deficit.   Psychiatric:         Behavior: Behavior normal.         Procedure  Procedures       Assessment & Plan    Problems Addressed this Visit          Cardiac and Vasculature    CAD prior RCA stents x2 - Primary (Chronic)    Chest pain       Pulmonary and Pneumonias    Shortness of breath     Diagnoses         Codes Comments    Coronary artery disease involving native coronary artery of native heart with angina pectoris    -  Primary ICD-10-CM: I25.119  ICD-9-CM: 414.01, 413.9     Shortness of breath     ICD-10-CM: R06.02  ICD-9-CM: 786.05     Chest pain, unspecified type     ICD-10-CM: R07.9  ICD-9-CM: 786.50             Recommendation  1.  Patient is a 78-year-old " "female who presents back to the office for follow-up.  She has history of coronary disease with recent stress test being negative for ischemia.  She was placed on antianginal therapy and continues to feel pressure that is quite significant and has taken nitroglycerin in the past.  I tried to convince the patient to consider catheterization because of her concern for eventual MI.  She is not interested.  She will continue medical therapy.  She does not feel her symptoms are \"that much worse\" than what she had felt in the past.  However, we discussed that with progression, we would like definitive evaluation.  She will wait for now and call us for any worsening symptoms.  If she develops any chest pain, not resolved by nitroglycerin, I recommend ER evaluation.      2.  Otherwise, patient with chronic dyspnea and underlying lung disease.  This is likely the etiology of her significant dyspnea.    3.  Patient is stable otherwise. Patient is on statin therapy and has labs followed by primary.  We will continue the same and see her back for follow-up in 6 months or sooner as symptoms discussed.  Follow-up with primary as scheduled.           Cassy Olvera  reports that she has been smoking cigarettes. She has a 50.00 pack-year smoking history. She has never used smokeless tobacco.. I have educated her on the risk of diseases from using tobacco products such as cancer, COPD, and heart disease.     I advised her to quit and she is not willing to quit.    I spent 3  minutes counseling the patient.      Patient brought in medicine list to appointment, it's been reviewed with patient and med list was updated in the chart.       electronically signed by:    "

## 2023-12-21 NOTE — PROGRESS NOTES
"Problem list     Subjective   Cassy SARA Olvera is a 78 y.o. female     Chief Complaint   Patient presents with    Follow-up     6 months   PROBLEM LIST:      1. CAD.  1.1 Cardiac Cath. \"Failed\" PCI of the RCA. [facility]. Winter 2014- Intraprocedural closure of vessel. ILUMIEN-1 stent mid-LAD, 01/20/2014. [place], [place].  1.2 Nuclear stress test 8-7-18 with lateral wall ischemia  1.3 left heart catheter 11/16/18-2 stents to the right coronary artery, 30-50% ostial narrowing of the circumflex, EF 50-55%, left ventricular end of blood pressure 14  1.4 stress test February 2023 with no evidence of ischemia and preserved LV function  2. Shortness of Breath.  2.1 Echo 8-7-18 with Ef > 65%, mild DD, trace MR, trace TR, pulm. Pressures 30-35 mmHg, no pericardial effusion  2.2 Echo 2/16/2022-EF 60± percent, mild LVH, diastolic dysfunction 1, trivial MR and trivial to mild TR, PA in the high 30s  3. Hyperlipidemia.   4.  Smoking habituation  5.  PVD  5.1 bilateral lower extremity arterial ultrasound 2/4/2020-diffuse arthrosis in both common femoral arteries without obstruction, stimulate mild atherosclerotic involvement of the superficial femoral arteries without stenosis, popliteal arteries are widely patent without aneurysmal dilation, distal vessels are patent, nonobstructive disease  6.  COPD, Dr. Goodrich  7.  Carotid artery disease with right carotid endarterectomy   7.2 bilateral carotid endarterectomies in 2022 by Dr. Aakash SMITH    Patient is a 78-year-old female who was back to the office for follow-up.  Patient complains of chest discomfort.  Patient had stress test earlier this year but continues to feel pressure.  It has mildly progressed since her last visit.  She has felt this intermittently despite medical therapy.  He seems to occur at rest.    Patient has dyspnea when exerting or trying to do activity.  Patient does not describe progressive symptoms.  No PND or orthopnea.    Patient does not describe " palpitating nor does patient complain of dysrhythmic symptoms.  She is stable otherwise.      Current Outpatient Medications on File Prior to Visit   Medication Sig Dispense Refill    amLODIPine (NORVASC) 5 MG tablet Take 1 tablet by mouth Daily. 90 tablet 3    aspirin 81 MG tablet Take 1 tablet by mouth Daily. 90 tablet 3    Coenzyme Q10 (Co Q-10) 100 MG capsule Take 100 mg by mouth 2 (Two) Times a Day. (Patient taking differently: Take 100 mg by mouth 2 (Two) Times a Day. Every other day) 180 capsule 3    furosemide (LASIX) 20 MG tablet Take 1 tablet by mouth Daily. 90 tablet 3    isosorbide mononitrate (IMDUR) 30 MG 24 hr tablet Take 1 tablet by mouth Every Morning. 90 tablet 3    lisinopril (PRINIVIL,ZESTRIL) 40 MG tablet Take 1 tablet by mouth Daily. 90 tablet 3    nitroglycerin (NITROSTAT) 0.4 MG SL tablet Place 1 tablet under the tongue Every 5 (Five) Minutes As Needed for Chest Pain. Take no more than 3 doses in 15 minutes. 25 tablet 6    rosuvastatin (CRESTOR) 20 MG tablet Take 1 tablet by mouth Daily. 90 tablet 3     No current facility-administered medications on file prior to visit.       Patient has no known allergies.    Past Medical History:   Diagnosis Date    Carotid artery disease     COPD (chronic obstructive pulmonary disease)     Coronary artery disease     COVID-19 vaccine administered     02/26/2021 , 03/26/2021 . 11/04/2021 - Moderna    Dizziness 01/14/2022    Hyperlipidemia     Hypertension     On home oxygen therapy     uses PRN and at HS       Social History     Socioeconomic History    Marital status:     Number of children: 2   Tobacco Use    Smoking status: Every Day     Packs/day: 1.00     Years: 50.00     Additional pack years: 0.00     Total pack years: 50.00     Types: Cigarettes    Smokeless tobacco: Never    Tobacco comments:     Pt states that she is trying quit she has cut down her smoking to 1/2 ppd 8/15/22   Vaping Use    Vaping Use: Never used   Substance and Sexual  "Activity    Alcohol use: No    Drug use: No    Sexual activity: Defer       Family History   Problem Relation Age of Onset    Hypertension Mother     Heart disease Mother     Diabetes Mother     Heart attack Father        Review of Systems   Constitutional:  Positive for fatigue. Negative for activity change, appetite change, chills and fever.   HENT: Negative.     Eyes: Negative.  Negative for visual disturbance.   Respiratory:  Positive for cough and shortness of breath. Negative for apnea, chest tightness and wheezing.    Cardiovascular:  Positive for chest pain. Negative for palpitations and leg swelling.   Gastrointestinal: Negative.  Negative for blood in stool.   Endocrine: Negative.  Negative for cold intolerance and heat intolerance.   Genitourinary:  Negative for hematuria.   Musculoskeletal: Negative.  Negative for arthralgias, back pain and gait problem.   Skin: Negative.  Negative for color change, rash and wound.   Allergic/Immunologic: Negative.  Negative for environmental allergies and food allergies.   Neurological:  Negative for dizziness, syncope, weakness, light-headedness, numbness and headaches.   Hematological: Negative.  Does not bruise/bleed easily.   Psychiatric/Behavioral: Negative.  Negative for sleep disturbance.        Objective   Vitals:    12/21/23 1315   BP: 108/63   BP Location: Left arm   Patient Position: Sitting   Cuff Size: Adult   Pulse: 77   SpO2: 94%   Weight: 77.6 kg (171 lb)   Height: 157.5 cm (62\")      /63 (BP Location: Left arm, Patient Position: Sitting, Cuff Size: Adult)   Pulse 77   Ht 157.5 cm (62\")   Wt 77.6 kg (171 lb)   SpO2 94%   BMI 31.28 kg/m²     Lab Results (most recent)       None            Physical Exam  Vitals and nursing note reviewed.   Constitutional:       General: She is not in acute distress.     Appearance: Normal appearance. She is well-developed.   HENT:      Head: Normocephalic and atraumatic.   Eyes:      General: No scleral icterus. "        Right eye: No discharge.         Left eye: No discharge.      Conjunctiva/sclera: Conjunctivae normal.   Neck:      Vascular: No carotid bruit.   Cardiovascular:      Rate and Rhythm: Normal rate and regular rhythm.      Heart sounds: Normal heart sounds. No murmur heard.     No friction rub. No gallop.   Pulmonary:      Effort: Pulmonary effort is normal. No respiratory distress.      Breath sounds: Normal breath sounds. No wheezing or rales.   Chest:      Chest wall: No tenderness.   Musculoskeletal:      Right lower leg: No edema.      Left lower leg: No edema.   Skin:     General: Skin is warm and dry.      Coloration: Skin is not pale.      Findings: No erythema or rash.   Neurological:      Mental Status: She is alert and oriented to person, place, and time.      Cranial Nerves: No cranial nerve deficit.   Psychiatric:         Behavior: Behavior normal.         Procedure   Procedures       Assessment & Plan     Problems Addressed this Visit          Cardiac and Vasculature    CAD prior RCA stents x2 - Primary (Chronic)    Chest pain       Pulmonary and Pneumonias    Shortness of breath     Diagnoses         Codes Comments    Coronary artery disease involving native coronary artery of native heart with angina pectoris    -  Primary ICD-10-CM: I25.119  ICD-9-CM: 414.01, 413.9     Shortness of breath     ICD-10-CM: R06.02  ICD-9-CM: 786.05     Chest pain, unspecified type     ICD-10-CM: R07.9  ICD-9-CM: 786.50             Recommendation  1.  Patient is a 78-year-old female who presents back to the office for follow-up.  She has history of coronary disease with recent stress test being negative for ischemia.  She was placed on antianginal therapy and continues to feel pressure that is quite significant and has taken nitroglycerin in the past.  I tried to convince the patient to consider catheterization because of her concern for eventual MI.  She is not interested.  She will continue medical therapy.  She  "does not feel her symptoms are \"that much worse\" than what she had felt in the past.  However, we discussed that with progression, we would like definitive evaluation.  She will wait for now and call us for any worsening symptoms.  If she develops any chest pain, not resolved by nitroglycerin, I recommend ER evaluation.      2.  Otherwise, patient with chronic dyspnea and underlying lung disease.  This is likely the etiology of her significant dyspnea.    3.  Patient is stable otherwise. Patient is on statin therapy and has labs followed by primary.  We will continue the same and see her back for follow-up in 6 months or sooner as symptoms discussed.  Follow-up with primary as scheduled.           Cassy SARA May  reports that she has been smoking cigarettes. She has a 50.00 pack-year smoking history. She has never used smokeless tobacco.. I have educated her on the risk of diseases from using tobacco products such as cancer, COPD, and heart disease.     I advised her to quit and she is not willing to quit.    I spent 3  minutes counseling the patient.      Patient brought in medicine list to appointment, it's been reviewed with patient and med list was updated in the chart.       electronically signed by:    "

## 2024-05-08 DIAGNOSIS — I65.23 CAROTID STENOSIS, ASYMPTOMATIC, BILATERAL: Primary | ICD-10-CM

## 2024-05-13 ENCOUNTER — TELEPHONE (OUTPATIENT)
Dept: CARDIAC SURGERY | Facility: CLINIC | Age: 79
End: 2024-05-13
Payer: MEDICARE

## 2024-05-24 DIAGNOSIS — I10 ESSENTIAL HYPERTENSION: ICD-10-CM

## 2024-05-28 RX ORDER — LISINOPRIL 40 MG/1
40 TABLET ORAL DAILY
Qty: 90 TABLET | Refills: 3 | Status: SHIPPED | OUTPATIENT
Start: 2024-05-28

## 2024-05-29 ENCOUNTER — TELEPHONE (OUTPATIENT)
Dept: CARDIOLOGY | Facility: CLINIC | Age: 79
End: 2024-05-29
Payer: MEDICARE

## 2024-05-29 RX ORDER — ISOSORBIDE MONONITRATE 30 MG/1
30 TABLET, EXTENDED RELEASE ORAL EVERY MORNING
Qty: 90 TABLET | Refills: 3 | Status: SHIPPED | OUTPATIENT
Start: 2024-05-29

## 2024-05-29 NOTE — TELEPHONE ENCOUNTER
Patient called triage left VM needing refill on a medication but VM was cut off.      Left VM for patient to return call

## 2024-05-29 NOTE — TELEPHONE ENCOUNTER
Caller: Ventura Olvera    Relationship: Emergency Contact    Best call back number: [phone]    Requested Prescriptions:   Requested Prescriptions     Pending Prescriptions Disp Refills    isosorbide mononitrate (IMDUR) 30 MG 24 hr tablet [Pharmacy Med Name: ISOSORBIDE MONONITRATE 30MG ER TABS] 90 tablet 3     Sig: TAKE 1 TABLET BY MOUTH EVERY MORNING        Pharmacy where request should be sent: [facility] #[id number] - [address] [facility] [address] [facility] [phone] [facility] [phone] [place]     Last office visit with prescribing clinician: 12/21/2023   Last telemedicine visit with prescribing clinician: Visit date not found   Next office visit with prescribing clinician: 5/29/2024     Additional details provided by patient: PATIENT IS COMPLETELY OUT     Does the patient have less than a 3 day supply:  [x] Yes  [] No    Would you like a call back once the refill request has been completed: [] Yes [x] No    If the office needs to give you a call back, can they leave a voicemail: [] Yes [x] No    Barak Roque Rep   05/29/24 15:44 EDT

## 2024-06-09 DIAGNOSIS — E78.2 MIXED HYPERLIPIDEMIA: ICD-10-CM

## 2024-06-09 DIAGNOSIS — I25.119 CORONARY ARTERY DISEASE INVOLVING NATIVE CORONARY ARTERY OF NATIVE HEART WITH ANGINA PECTORIS: ICD-10-CM

## 2024-06-09 DIAGNOSIS — R06.02 SHORTNESS OF BREATH: ICD-10-CM

## 2024-06-10 RX ORDER — ROSUVASTATIN CALCIUM 20 MG/1
20 TABLET, COATED ORAL DAILY
Qty: 90 TABLET | Refills: 3 | Status: SHIPPED | OUTPATIENT
Start: 2024-06-10

## 2024-06-24 ENCOUNTER — OFFICE VISIT (OUTPATIENT)
Dept: CARDIOLOGY | Facility: CLINIC | Age: 79
End: 2024-06-24
Payer: MEDICARE

## 2024-06-24 VITALS
RESPIRATION RATE: 20 BRPM | WEIGHT: 167 LBS | OXYGEN SATURATION: 98 % | BODY MASS INDEX: 30.73 KG/M2 | HEART RATE: 60 BPM | HEIGHT: 62 IN | SYSTOLIC BLOOD PRESSURE: 123 MMHG | DIASTOLIC BLOOD PRESSURE: 71 MMHG

## 2024-06-24 DIAGNOSIS — I73.9 INTERMITTENT CLAUDICATION: Primary | ICD-10-CM

## 2024-06-24 DIAGNOSIS — R06.02 SHORTNESS OF BREATH: ICD-10-CM

## 2024-06-24 DIAGNOSIS — I25.119 CORONARY ARTERY DISEASE INVOLVING NATIVE CORONARY ARTERY OF NATIVE HEART WITH ANGINA PECTORIS: ICD-10-CM

## 2024-06-24 DIAGNOSIS — I10 ESSENTIAL HYPERTENSION: ICD-10-CM

## 2024-06-24 PROCEDURE — 3074F SYST BP LT 130 MM HG: CPT | Performed by: PHYSICIAN ASSISTANT

## 2024-06-24 PROCEDURE — 99214 OFFICE O/P EST MOD 30 MIN: CPT | Performed by: PHYSICIAN ASSISTANT

## 2024-06-24 PROCEDURE — 3078F DIAST BP <80 MM HG: CPT | Performed by: PHYSICIAN ASSISTANT

## 2024-06-24 NOTE — LETTER
"June 25, 2024       No Recipients    Patient: Cassy Olvera   YOB: 1945   Date of Visit: 6/24/2024       Dear Baltazar Joseph MD    Cassy Olvera was in my office today. Below is a copy of my note.    If you have questions, please do not hesitate to call me. I look forward to following Cassy along with you.         Sincerely,        EZEQUIEL Arroyo        CC:   No Recipients    Problem list     Subjective  Cassy Olvera is a 78 y.o. female     Chief Complaint   Patient presents with   • Follow-up     6 month   • Leg Swelling   PROBLEM LIST:      1. CAD.  1.1 Cardiac Cath. \"Failed\" PCI of the RCA. [facility]. Winter 2014- Intraprocedural closure of vessel. ILUMIEN-1 stent mid-LAD, 01/20/2014. [place], [place].  1.2 Nuclear stress test 8-7-18 with lateral wall ischemia  1.3 left heart catheter 11/16/18-2 stents to the right coronary artery, 30-50% ostial narrowing of the circumflex, EF 50-55%, left ventricular end of blood pressure 14  1.4 stress test February 2023 with no evidence of ischemia and preserved LV function  2. Shortness of Breath.  2.1 Echo 8-7-18 with Ef > 65%, mild DD, trace MR, trace TR, pulm. Pressures 30-35 mmHg, no pericardial effusion  2.2 Echo 2/16/2022-EF 60± percent, mild LVH, diastolic dysfunction 1, trivial MR and trivial to mild TR, PA in the high 30s  3. Hyperlipidemia.   4.  Smoking habituation  5.  PVD  5.1 bilateral lower extremity arterial ultrasound 2/4/2020-diffuse arthrosis in both common femoral arteries without obstruction, stimulate mild atherosclerotic involvement of the superficial femoral arteries without stenosis, popliteal arteries are widely patent without aneurysmal dilation, distal vessels are patent, nonobstructive disease  6.  COPD, Dr. Goodrich  7.  Carotid artery disease with right carotid endarterectomy   7.2 bilateral carotid endarterectomies in 2022 by Dr. Finn  7 point 3 repeat carotid duplex May 2023 with no obstruction " noted.    HPI    Patient is a 78-year-old female presenting back to the office for routine assessment.  She has history of coronary artery disease.  She also has history of normal systolic function as well as vascular disease.  She has had bilateral carotid endarterectomies and has had lower extremity disease which apparently has not been severe in the past.    She has chronic chest discomfort.  She does not describe any progressive or severe chest pain.  It seems to be a chronic discomfort that she experiences randomly.  She has a degree of dyspnea that is moderate at baseline.  She has smoked for quite some time and continues to display her heart disease and lung disease.  She does not describe PND, orthopnea, or severe edema.    She occasionally may palpitate.  She might sense a flutter sensation randomly.  She does not describe dizziness, presyncope, or syncope.  She is stable otherwise.      Current Outpatient Medications on File Prior to Visit   Medication Sig Dispense Refill   • amLODIPine (NORVASC) 5 MG tablet Take 1 tablet by mouth Daily. 90 tablet 3   • aspirin 81 MG tablet Take 1 tablet by mouth Daily. 90 tablet 3   • Coenzyme Q10 (Co Q-10) 100 MG capsule Take 100 mg by mouth 2 (Two) Times a Day. (Patient taking differently: Take 100 mg by mouth 2 (Two) Times a Day. Every other day) 180 capsule 3   • furosemide (LASIX) 20 MG tablet Take 1 tablet by mouth Daily. 90 tablet 3   • isosorbide mononitrate (IMDUR) 30 MG 24 hr tablet TAKE 1 TABLET BY MOUTH EVERY MORNING 90 tablet 3   • lisinopril (PRINIVIL,ZESTRIL) 40 MG tablet TAKE 1 TABLET BY MOUTH DAILY 90 tablet 3   • nitroglycerin (NITROSTAT) 0.4 MG SL tablet Place 1 tablet under the tongue Every 5 (Five) Minutes As Needed for Chest Pain. Take no more than 3 doses in 15 minutes. 25 tablet 6   • rosuvastatin (CRESTOR) 20 MG tablet TAKE 1 TABLET BY MOUTH DAILY 90 tablet 3     No current facility-administered medications on file prior to visit.       Patient  has no known allergies.    Past Medical History:   Diagnosis Date   • Carotid artery disease    • COPD (chronic obstructive pulmonary disease)    • Coronary artery disease    • COVID-19 vaccine administered     02/26/2021 , 03/26/2021 . 11/04/2021 - Moderna   • Dizziness 01/14/2022   • Hyperlipidemia    • Hypertension    • On home oxygen therapy     uses PRN and at HS       Social History     Socioeconomic History   • Marital status:    • Number of children: 2   Tobacco Use   • Smoking status: Every Day     Current packs/day: 1.00     Average packs/day: 1 pack/day for 50.0 years (50.0 ttl pk-yrs)     Types: Cigarettes   • Smokeless tobacco: Never   • Tobacco comments:     Pt states that she is trying quit she has cut down her smoking to 1/2 ppd 8/15/22   Vaping Use   • Vaping status: Never Used   Substance and Sexual Activity   • Alcohol use: No   • Drug use: No   • Sexual activity: Defer       Family History   Problem Relation Age of Onset   • Hypertension Mother    • Heart disease Mother    • Diabetes Mother    • Heart attack Father        Review of Systems   Constitutional:  Positive for appetite change and fatigue.   HENT:  Negative for congestion, sinus pressure and sinus pain.    Eyes: Negative.    Respiratory:  Positive for shortness of breath and wheezing. Negative for apnea and chest tightness.    Cardiovascular:  Positive for chest pain, palpitations and leg swelling.   Gastrointestinal: Negative.    Endocrine: Negative.    Genitourinary:  Positive for frequency. Negative for difficulty urinating.   Musculoskeletal:  Positive for back pain. Negative for neck pain.   Skin:  Positive for color change.   Allergic/Immunologic: Negative.    Neurological:  Positive for dizziness, light-headedness and headaches.       Objective  Vitals:    06/24/24 1401   BP: 123/71   BP Location: Right arm   Patient Position: Sitting   Cuff Size: Adult   Pulse: 60   Resp: 20   SpO2: 98%   Weight: 75.8 kg (167 lb)  "  Height: 157.5 cm (62\")      /71 (BP Location: Right arm, Patient Position: Sitting, Cuff Size: Adult)   Pulse 60   Resp 20   Ht 157.5 cm (62\")   Wt 75.8 kg (167 lb)   SpO2 98%   BMI 30.54 kg/m²     Lab Results (most recent)       None            Physical Exam  Vitals and nursing note reviewed.   Constitutional:       General: She is not in acute distress.     Appearance: Normal appearance. She is well-developed.   HENT:      Head: Normocephalic and atraumatic.   Eyes:      General: No scleral icterus.        Right eye: No discharge.         Left eye: No discharge.      Conjunctiva/sclera: Conjunctivae normal.   Neck:      Vascular: No carotid bruit.   Cardiovascular:      Rate and Rhythm: Normal rate and regular rhythm.      Heart sounds: Normal heart sounds. No murmur heard.     No friction rub. No gallop.   Pulmonary:      Effort: Pulmonary effort is normal. No respiratory distress.      Breath sounds: Normal breath sounds. No wheezing or rales.   Chest:      Chest wall: No tenderness.   Musculoskeletal:      Right lower leg: No edema.      Left lower leg: No edema.   Skin:     General: Skin is warm and dry.      Coloration: Skin is not pale.      Findings: No erythema or rash.   Neurological:      Mental Status: She is alert and oriented to person, place, and time.      Cranial Nerves: No cranial nerve deficit.   Psychiatric:         Behavior: Behavior normal.         Procedure  Procedures       Assessment & Plan    Problems Addressed this Visit          Cardiac and Vasculature    CAD prior RCA stents x2 (Chronic)    Essential hypertension    Intermittent claudication - Primary    Relevant Orders    Duplex Lower Extremity Art / Grafts - Bilateral CAR       Pulmonary and Pneumonias    Shortness of breath     Diagnoses         Codes Comments    Intermittent claudication    -  Primary ICD-10-CM: I73.9  ICD-9-CM: 443.9     Coronary artery disease involving native coronary artery of native heart with " angina pectoris     ICD-10-CM: I25.119  ICD-9-CM: 414.01, 413.9     Shortness of breath     ICD-10-CM: R06.02  ICD-9-CM: 786.05     Essential hypertension     ICD-10-CM: I10  ICD-9-CM: 401.9           Recommendations  1.  Patient is a 78-year-old female presenting back for routine cardiac assessment.  Patient complains of lower extremity discomfort.  I will schedule arterial duplex to evaluate for symptoms concerning for claudication.    2.  Patient with known coronary disease with chest pain and dyspnea.  She continues to smoke.  We have discussed repeat testing but she does not seem interested.  She is on antianginal therapy continues to feel chest discomfort.    She did have a stress test last year and if she fails medical therapy, would recommend further coronary evaluation.  However, she appears to want to stay like she is for now.  I will make no changes but if she has any chest pain not resolved by nitroglycerin, I recommend she go to the ER.    3.  Patient with baseline hypertension doing well medical therapy and I will make no changes.    4.  Patient with chronic dyspnea with continued tobacco use and underlying lung disease.  Her dyspnea is likely multifactorial but likely more related to her lung status.  We can monitor for now.    5.  Patient can palpitate occasionally.  She might sense occasional fluttering sensation.  No strokelike symptoms.  She does not seem interested in any further testing other than a evaluation of her lower extremity disease.  If symptoms worsen, I want her to call the office.  We will see her back for follow-up on the above and recommend further.  Follow-up with primary as scheduled.             Electronically signed by:

## 2024-06-24 NOTE — PROGRESS NOTES
"Problem list     Subjective   Cassy SARA Olvera is a 78 y.o. female     Chief Complaint   Patient presents with    Follow-up     6 month    Leg Swelling   PROBLEM LIST:      1. CAD.  1.1 Cardiac Cath. \"Failed\" PCI of the RCA. [facility]. Winter 2014- Intraprocedural closure of vessel. ILUMIEN-1 stent mid-LAD, 01/20/2014. [place], [place].  1.2 Nuclear stress test 8-7-18 with lateral wall ischemia  1.3 left heart catheter 11/16/18-2 stents to the right coronary artery, 30-50% ostial narrowing of the circumflex, EF 50-55%, left ventricular end of blood pressure 14  1.4 stress test February 2023 with no evidence of ischemia and preserved LV function  2. Shortness of Breath.  2.1 Echo 8-7-18 with Ef > 65%, mild DD, trace MR, trace TR, pulm. Pressures 30-35 mmHg, no pericardial effusion  2.2 Echo 2/16/2022-EF 60± percent, mild LVH, diastolic dysfunction 1, trivial MR and trivial to mild TR, PA in the high 30s  3. Hyperlipidemia.   4.  Smoking habituation  5.  PVD  5.1 bilateral lower extremity arterial ultrasound 2/4/2020-diffuse arthrosis in both common femoral arteries without obstruction, stimulate mild atherosclerotic involvement of the superficial femoral arteries without stenosis, popliteal arteries are widely patent without aneurysmal dilation, distal vessels are patent, nonobstructive disease  6.  COPD, Dr. Goodrich  7.  Carotid artery disease with right carotid endarterectomy   7.2 bilateral carotid endarterectomies in 2022 by Dr. Finn  7 point 3 repeat carotid duplex May 2023 with no obstruction noted.    HPI    Patient is a 78-year-old female presenting back to the office for routine assessment.  She has history of coronary artery disease.  She also has history of normal systolic function as well as vascular disease.  She has had bilateral carotid endarterectomies and has had lower extremity disease which apparently has not been severe in the past.    She has chronic chest discomfort.  She does not describe " any progressive or severe chest pain.  It seems to be a chronic discomfort that she experiences randomly.  She has a degree of dyspnea that is moderate at baseline.  She has smoked for quite some time and continues to display her heart disease and lung disease.  She does not describe PND, orthopnea, or severe edema.    She occasionally may palpitate.  She might sense a flutter sensation randomly.  She does not describe dizziness, presyncope, or syncope.  She is stable otherwise.      Current Outpatient Medications on File Prior to Visit   Medication Sig Dispense Refill    amLODIPine (NORVASC) 5 MG tablet Take 1 tablet by mouth Daily. 90 tablet 3    aspirin 81 MG tablet Take 1 tablet by mouth Daily. 90 tablet 3    Coenzyme Q10 (Co Q-10) 100 MG capsule Take 100 mg by mouth 2 (Two) Times a Day. (Patient taking differently: Take 100 mg by mouth 2 (Two) Times a Day. Every other day) 180 capsule 3    furosemide (LASIX) 20 MG tablet Take 1 tablet by mouth Daily. 90 tablet 3    isosorbide mononitrate (IMDUR) 30 MG 24 hr tablet TAKE 1 TABLET BY MOUTH EVERY MORNING 90 tablet 3    lisinopril (PRINIVIL,ZESTRIL) 40 MG tablet TAKE 1 TABLET BY MOUTH DAILY 90 tablet 3    nitroglycerin (NITROSTAT) 0.4 MG SL tablet Place 1 tablet under the tongue Every 5 (Five) Minutes As Needed for Chest Pain. Take no more than 3 doses in 15 minutes. 25 tablet 6    rosuvastatin (CRESTOR) 20 MG tablet TAKE 1 TABLET BY MOUTH DAILY 90 tablet 3     No current facility-administered medications on file prior to visit.       Patient has no known allergies.    Past Medical History:   Diagnosis Date    Carotid artery disease     COPD (chronic obstructive pulmonary disease)     Coronary artery disease     COVID-19 vaccine administered     02/26/2021 , 03/26/2021 . 11/04/2021 - Moderna    Dizziness 01/14/2022    Hyperlipidemia     Hypertension     On home oxygen therapy     uses PRN and at HS       Social History     Socioeconomic History    Marital status:  "    Number of children: 2   Tobacco Use    Smoking status: Every Day     Current packs/day: 1.00     Average packs/day: 1 pack/day for 50.0 years (50.0 ttl pk-yrs)     Types: Cigarettes    Smokeless tobacco: Never    Tobacco comments:     Pt states that she is trying quit she has cut down her smoking to 1/2 ppd 8/15/22   Vaping Use    Vaping status: Never Used   Substance and Sexual Activity    Alcohol use: No    Drug use: No    Sexual activity: Defer       Family History   Problem Relation Age of Onset    Hypertension Mother     Heart disease Mother     Diabetes Mother     Heart attack Father        Review of Systems   Constitutional:  Positive for appetite change and fatigue.   HENT:  Negative for congestion, sinus pressure and sinus pain.    Eyes: Negative.    Respiratory:  Positive for shortness of breath and wheezing. Negative for apnea and chest tightness.    Cardiovascular:  Positive for chest pain, palpitations and leg swelling.   Gastrointestinal: Negative.    Endocrine: Negative.    Genitourinary:  Positive for frequency. Negative for difficulty urinating.   Musculoskeletal:  Positive for back pain. Negative for neck pain.   Skin:  Positive for color change.   Allergic/Immunologic: Negative.    Neurological:  Positive for dizziness, light-headedness and headaches.       Objective   Vitals:    06/24/24 1401   BP: 123/71   BP Location: Right arm   Patient Position: Sitting   Cuff Size: Adult   Pulse: 60   Resp: 20   SpO2: 98%   Weight: 75.8 kg (167 lb)   Height: 157.5 cm (62\")      /71 (BP Location: Right arm, Patient Position: Sitting, Cuff Size: Adult)   Pulse 60   Resp 20   Ht 157.5 cm (62\")   Wt 75.8 kg (167 lb)   SpO2 98%   BMI 30.54 kg/m²     Lab Results (most recent)       None            Physical Exam  Vitals and nursing note reviewed.   Constitutional:       General: She is not in acute distress.     Appearance: Normal appearance. She is well-developed.   HENT:      Head: " Normocephalic and atraumatic.   Eyes:      General: No scleral icterus.        Right eye: No discharge.         Left eye: No discharge.      Conjunctiva/sclera: Conjunctivae normal.   Neck:      Vascular: No carotid bruit.   Cardiovascular:      Rate and Rhythm: Normal rate and regular rhythm.      Heart sounds: Normal heart sounds. No murmur heard.     No friction rub. No gallop.   Pulmonary:      Effort: Pulmonary effort is normal. No respiratory distress.      Breath sounds: Normal breath sounds. No wheezing or rales.   Chest:      Chest wall: No tenderness.   Musculoskeletal:      Right lower leg: No edema.      Left lower leg: No edema.   Skin:     General: Skin is warm and dry.      Coloration: Skin is not pale.      Findings: No erythema or rash.   Neurological:      Mental Status: She is alert and oriented to person, place, and time.      Cranial Nerves: No cranial nerve deficit.   Psychiatric:         Behavior: Behavior normal.         Procedure   Procedures       Assessment & Plan     Problems Addressed this Visit          Cardiac and Vasculature    CAD prior RCA stents x2 (Chronic)    Essential hypertension    Intermittent claudication - Primary    Relevant Orders    Duplex Lower Extremity Art / Grafts - Bilateral CAR       Pulmonary and Pneumonias    Shortness of breath     Diagnoses         Codes Comments    Intermittent claudication    -  Primary ICD-10-CM: I73.9  ICD-9-CM: 443.9     Coronary artery disease involving native coronary artery of native heart with angina pectoris     ICD-10-CM: I25.119  ICD-9-CM: 414.01, 413.9     Shortness of breath     ICD-10-CM: R06.02  ICD-9-CM: 786.05     Essential hypertension     ICD-10-CM: I10  ICD-9-CM: 401.9           Recommendations  1.  Patient is a 78-year-old female presenting back for routine cardiac assessment.  Patient complains of lower extremity discomfort.  I will schedule arterial duplex to evaluate for symptoms concerning for claudication.    2.   Patient with known coronary disease with chest pain and dyspnea.  She continues to smoke.  We have discussed repeat testing but she does not seem interested.  She is on antianginal therapy continues to feel chest discomfort.    She did have a stress test last year and if she fails medical therapy, would recommend further coronary evaluation.  However, she appears to want to stay like she is for now.  I will make no changes but if she has any chest pain not resolved by nitroglycerin, I recommend she go to the ER.    3.  Patient with baseline hypertension doing well medical therapy and I will make no changes.    4.  Patient with chronic dyspnea with continued tobacco use and underlying lung disease.  Her dyspnea is likely multifactorial but likely more related to her lung status.  We can monitor for now.    5.  Patient can palpitate occasionally.  She might sense occasional fluttering sensation.  No strokelike symptoms.  She does not seem interested in any further testing other than a evaluation of her lower extremity disease.  If symptoms worsen, I want her to call the office.  We will see her back for follow-up on the above and recommend further.  Follow-up with primary as scheduled.             Electronically signed by:

## 2024-07-05 ENCOUNTER — TELEPHONE (OUTPATIENT)
Dept: CARDIAC SURGERY | Facility: CLINIC | Age: 79
End: 2024-07-05
Payer: MEDICARE

## 2024-07-05 NOTE — TELEPHONE ENCOUNTER
Spoke with patient, she is scheduled for carotid duplex and lower extremity duplex 7/10/24 at Gustavo Alanis's office in [place].  Scheduled her appointment to see us 7/17/24 for carotid duplex results.

## 2024-07-05 NOTE — TELEPHONE ENCOUNTER
"    Caller: Cassy Olvera \"Carmen\"    Relationship to patient: Self    Best call back number:[phone]        Type of visit: F/U    Requested date: ASAP     If rescheduling, when is the original appointment: 7/ 8/24    Additional notes:PT HAD DR. SANCHEZ CANCEL F/U BUT NOW WANTS IT BACK. HUB WAS UNSURE IF TESTING WAS NEEDED. PLEASE GIVE PT A CALL BACK TO DISCUSS. THANK YOU         "

## 2024-07-10 ENCOUNTER — HOSPITAL ENCOUNTER (OUTPATIENT)
Dept: CARDIOLOGY | Facility: HOSPITAL | Age: 79
Discharge: HOME | End: 2024-07-10
Payer: MEDICARE

## 2024-07-10 DIAGNOSIS — I65.23 CAROTID STENOSIS, ASYMPTOMATIC, BILATERAL: ICD-10-CM

## 2024-07-10 DIAGNOSIS — I73.9 INTERMITTENT CLAUDICATION: ICD-10-CM

## 2024-07-10 LAB
BH CV LEA LEFT ANT TIBIAL A DISTAL PSV: 69 CM/S
BH CV LEA LEFT CFA PROX PSV: 153 CM/S
BH CV LEA LEFT DFA PROX PSV: 93 CM/S
BH CV LEA LEFT POPITEAL A  PROX PSV: 104 CM/S
BH CV LEA LEFT PTA DISTAL PSV: 94 CM/S
BH CV LEA LEFT SFA DISTAL PSV: 90 CM/S
BH CV LEA LEFT SFA MID PSV: -84.1 CM/S
BH CV LEA LEFT SFA PROX PSV: -91.1 CM/S
BH CV LEA RIGHT ANT TIBIAL A DISTAL PSV: 58 CM/S
BH CV LEA RIGHT CFA PROX PSV: 133 CM/S
BH CV LEA RIGHT DFA PROX PSV: 62 CM/S
BH CV LEA RIGHT POPITEAL A  PROX PSV: 51 CM/S
BH CV LEA RIGHT PTA DISTAL PSV: 80 CM/S
BH CV LEA RIGHT SFA DISTAL PSV: -96.2 CM/S
BH CV LEA RIGHT SFA MID PSV: -72.9 CM/S
BH CV LEA RIGHT SFA PROX PSV: -86.9 CM/S
LEFT GROIN CFA SYS: 153 CM/SEC
RIGHT GROIN CFA SYS: 133 CM/SEC

## 2024-07-10 PROCEDURE — 93880 EXTRACRANIAL BILAT STUDY: CPT

## 2024-07-10 PROCEDURE — 93925 LOWER EXTREMITY STUDY: CPT

## 2024-07-17 ENCOUNTER — OFFICE VISIT (OUTPATIENT)
Dept: CARDIAC SURGERY | Facility: CLINIC | Age: 79
End: 2024-07-17
Payer: MEDICARE

## 2024-07-17 VITALS
BODY MASS INDEX: 28.34 KG/M2 | DIASTOLIC BLOOD PRESSURE: 50 MMHG | OXYGEN SATURATION: 92 % | SYSTOLIC BLOOD PRESSURE: 138 MMHG | TEMPERATURE: 97 F | HEART RATE: 73 BPM | HEIGHT: 64 IN | WEIGHT: 166 LBS

## 2024-07-17 DIAGNOSIS — I65.23 CAROTID STENOSIS, ASYMPTOMATIC, BILATERAL: Primary | ICD-10-CM

## 2024-07-17 NOTE — PROGRESS NOTES
..     [facility] Cardiothoracic Surgery Office Follow Up Note    Date of Encounter: 2024     Name: Cassy Olvera  : 1945     Referred By: No ref. provider found  PCP: Provider, No Known    Chief Complaint:    Chief Complaint   Patient presents with    Follow-up     1 YR FU with Carotid Duplex-Hx of Bilateral CEA's-Continues to Have Left Sided Neck Numbness and into Back Area       Subjective      History of Present Illness:    It was nice to see Cassy Olvera in follow up.  She is a pleasant 78 y.o. female with PMH significant for current tobacco dependence (1 ppd), hypertension, hyperlipidemia on statin therapy, COPD on supplemental oxygen as needed, peripheral vascular disease, coronary artery disease with prior RCA stenting x2, and bilateral carotid artery stenosis  s/p right carotid endarterectomy by Dr. Finn on 2022 and left carotid endarterectomy with EEG by Dr. Finn on 2022.     Patient was last seen in office by myself on 2023 at which time she continued to experience hoarseness.  She had been evaluated by ENT and treated with an injection.  She also continued to experience numbness in the let neck and partially down her left arm.  Ms. Olvera presents today for surveillance with imaging.  Patient denies symptoms of TIA or CVA since last visit.  She denies acute neurologic dysfunction or vision changes, specifically amaurosis fugax.  Patient continues to report numbness in her neck and left arm down to her elbow.  She follows with PCP and Cardiology, Dr. Byrne.     Review of Systems:  Review of Systems   Constitutional: Positive for malaise/fatigue and weight loss (due to current dental work-difficulty eating). Negative for chills, decreased appetite, diaphoresis, fever, night sweats and weight gain.   HENT:  Negative for hoarse voice.    Eyes:  Negative for blurred vision, double vision and visual disturbance.   Cardiovascular:  Positive for chest  pain and leg swelling (left leg). Negative for claudication, dyspnea on exertion, irregular heartbeat, near-syncope, orthopnea, palpitations, paroxysmal nocturnal dyspnea and syncope.   Respiratory:  Positive for cough, shortness of breath and wheezing. Negative for hemoptysis and sputum production.    Hematologic/Lymphatic: Negative for adenopathy and bleeding problem. Bruises/bleeds easily.   Skin:  Negative for color change, nail changes, poor wound healing and rash.   Musculoskeletal:  Positive for back pain. Negative for falls and muscle cramps.   Gastrointestinal:  Negative for abdominal pain, dysphagia and heartburn.   Genitourinary:  Negative for flank pain.   Neurological:  Positive for dizziness, light-headedness and numbness (left neck area and back). Negative for brief paralysis, disturbances in coordination, focal weakness, headaches, loss of balance, paresthesias, sensory change, vertigo and weakness.   Psychiatric/Behavioral:  Negative for depression and suicidal ideas.    Allergic/Immunologic: Negative for persistent infections.       I have reviewed the following portions of the patient's history: problem list, current medications, allergies, past surgical history, past medical history, past social history, past family history, and ROS and confirm it's accurate.    Allergies:  No Known Allergies    Medications:      Current Outpatient Medications:     amLODIPine (NORVASC) 5 MG tablet, Take 1 tablet by mouth Daily., Disp: 90 tablet, Rfl: 3    aspirin 81 MG tablet, Take 1 tablet by mouth Daily., Disp: 90 tablet, Rfl: 3    Coenzyme Q10 (Co Q-10) 100 MG capsule, Take 100 mg by mouth 2 (Two) Times a Day. (Patient taking differently: Take 100 mg by mouth 2 (Two) Times a Day. Every other day), Disp: 180 capsule, Rfl: 3    furosemide (LASIX) 20 MG tablet, Take 1 tablet by mouth Daily., Disp: 90 tablet, Rfl: 3    isosorbide mononitrate (IMDUR) 30 MG 24 hr tablet, TAKE 1 TABLET BY MOUTH EVERY MORNING, Disp:  90 tablet, Rfl: 3    lisinopril (PRINIVIL,ZESTRIL) 40 MG tablet, TAKE 1 TABLET BY MOUTH DAILY, Disp: 90 tablet, Rfl: 3    nitroglycerin (NITROSTAT) 0.4 MG SL tablet, Place 1 tablet under the tongue Every 5 (Five) Minutes As Needed for Chest Pain. Take no more than 3 doses in 15 minutes., Disp: 25 tablet, Rfl: 6    rosuvastatin (CRESTOR) 20 MG tablet, TAKE 1 TABLET BY MOUTH DAILY, Disp: 90 tablet, Rfl: 3    History:   Past Medical History:   Diagnosis Date    Carotid artery disease     COPD (chronic obstructive pulmonary disease)     Coronary artery disease     COVID-19 vaccine administered     02/26/2021 , 03/26/2021 . 11/04/2021 - Moderna    Dizziness 01/14/2022    Hyperlipidemia     Hypertension     On home oxygen therapy     uses PRN and at HS       Past Surgical History:   Procedure Laterality Date    CARDIAC CATHETERIZATION  2018    stents x 2 to RCA    CAROTID ENDARTERECTOMY Right 5/17/2022    Procedure: CAROTID ENDARTERECTOMY WITH EEG RIGHT;  Surgeon: Shiraz Finn MD;  Location:  [facility];  Service: Vascular;  Laterality: Right;    CAROTID ENDARTERECTOMY Left 8/4/2022    Procedure: CAROTID ENDARTERECTOMY WITH EEG LEFT;  Surgeon: Shiraz Finn MD;  Location:  [facility];  Service: Vascular;  Laterality: Left;  CLAMP ON: 0727  CLAMP OFF: 0745    COLONOSCOPY W/ BIOPSIES      CORONARY STENT PLACEMENT  01/20/2014    Stent x1 01/20/14    ELBOW PROCEDURE Left     fracture repair    HYSTERECTOMY         Social History     Socioeconomic History    Marital status:     Number of children: 2   Tobacco Use    Smoking status: Every Day     Current packs/day: 1.00     Average packs/day: 1 pack/day for 50.0 years (50.0 ttl pk-yrs)     Types: Cigarettes    Smokeless tobacco: Never    Tobacco comments:     Pt states that she is trying quit she has cut down her smoking to 1/2 ppd 8/15/22   Vaping Use    Vaping status: Never Used   Substance and Sexual Activity    Alcohol use: No    Drug use: No    Sexual  "activity: Defer        Family History   Problem Relation Age of Onset    Hypertension Mother     Heart disease Mother     Diabetes Mother     Heart attack Father        Objective     Physical Exam:  Vitals:    07/17/24 0933 07/17/24 0934   BP: 130/50 138/50   BP Location: Left arm Right arm   Patient Position: Sitting Sitting   Pulse: 73    Temp: 97 °F (36.1 °C)    SpO2: 92%    Weight: 75.3 kg (166 lb)    Height: 162.6 cm (64\")  Comment: patient reported       Body mass index is 28.49 kg/m².    Physical Exam  Vitals reviewed.   Constitutional:       General: She is not in acute distress.     Appearance: Normal appearance. She is not ill-appearing.   Eyes:      Pupils: Pupils are equal, round, and reactive to light.   Neck:      Vascular: No carotid bruit.   Cardiovascular:      Rate and Rhythm: Normal rate and regular rhythm.      Heart sounds: No murmur heard.     No friction rub.   Pulmonary:      Effort: Pulmonary effort is normal.   Musculoskeletal:      Right lower leg: Edema (mild) present.      Left lower leg: Edema (mild) present.   Neurological:      General: No focal deficit present.      Mental Status: She is alert and oriented to person, place, and time.   Psychiatric:         Behavior: Behavior normal.         Thought Content: Thought content normal.         Judgment: Judgment normal.         Imaging/Labs:  US Carotid Bilateral (07/10/2024 14:32)   US Carotid Bilateral  Result Date: 7/10/2024  1.  50-69% stenosis in the right internal carotid artery due to atherosclerotic plaque. 2.  50-69% stenosis in the left internal carotid artery due to atherosclerotic plaque. 3.  Some scattered bilateral ICA calcified plaque.   This report was finalized on 7/10/2024 2:39 PM by Dr. Cory Alicea MD.      Right ICA PSV:  137 cm/s  Left ICA PSV:  170 cm/s    ..I personally reviewed this report and imaging.    Assessment / Plan    Assessment / Plan:  Bilateral carotid artery stenosis  S/p right carotid endarterectomy " with EEG by Dr. Finn on 5/17/2022.  S/p left carotid endarterectomy with EEG by Dr. Finn on 8/4/2022.   Last seen in office by myself on 6/29/2023.  Continue to experience hoarseness; had been evaluated by ENT and treated with and injection.    Reported numbness in the neck and partially down her left arm.   Presents today for surveillance with imaging.   Denies symptoms of TIA or CVA since last visit.  Denies acute neurologic dysfunction or vision changes, specifically amaurosis fugax.   Continues to report numbness in her neck and left arm down to her elbow.   Carotid duplex with stable findings as resulted above under imaging/labs.   Follows with PCP and Cardiology, Dr. Byrne.  She does continue to smoke -- cessation encouraged.       Patient Education:  ..The BE FAST acronym helps you remember the signs and symptoms of a stroke: Balance loss, Eyesight loss, Facial drooping, Arm weakness, Speech difficulty, and Time to call 911    Follow Up:     Patient encouraged to call the office with any questions or concerns.     Thank you for allowing me to participate in your care.  Best Regards,    SHEILA Wellington  [facility] Cardiothoracic Surgery  07/17/24  09:36 EDT    I spent 25 minutes caring for Ms. Olvera on this date of service. This time includes time spent by me in the following activities: preparing for the visit, reviewing tests, obtaining and/or reviewing a separately obtained history, performing a medically appropriate examination and/or evaluation, counseling and educating the patient/family/caregiver, ordering medications, tests, or procedures, referring and communicating with other health care professionals, documenting information in the medical record, independently interpreting results and communicating that information with the patient/family/caregiver, and care coordination.

## 2024-07-21 LAB
BH CV LEA LEFT ANT TIBIAL A DISTAL PSV: 69 CM/S
BH CV LEA LEFT CFA PROX PSV: 153 CM/S
BH CV LEA LEFT DFA PROX PSV: 93 CM/S
BH CV LEA LEFT POPITEAL A  PROX PSV: 104 CM/S
BH CV LEA LEFT PTA DISTAL PSV: 94 CM/S
BH CV LEA LEFT SFA DISTAL PSV: -89.7 CM/S
BH CV LEA LEFT SFA MID PSV: -84.1 CM/S
BH CV LEA LEFT SFA PROX PSV: -91.1 CM/S
BH CV LEA RIGHT ANT TIBIAL A DISTAL PSV: 58 CM/S
BH CV LEA RIGHT CFA PROX PSV: 133 CM/S
BH CV LEA RIGHT DFA PROX PSV: 62 CM/S
BH CV LEA RIGHT POPITEAL A  PROX PSV: 51 CM/S
BH CV LEA RIGHT PTA DISTAL PSV: 80 CM/S
BH CV LEA RIGHT SFA DISTAL PSV: -96.2 CM/S
BH CV LEA RIGHT SFA MID PSV: -72.9 CM/S
BH CV LEA RIGHT SFA PROX PSV: -86.9 CM/S
LEFT GROIN CFA SYS: 153 CM/SEC
RIGHT GROIN CFA SYS: 133 CM/SEC

## 2024-07-23 ENCOUNTER — TELEPHONE (OUTPATIENT)
Dept: CARDIOLOGY | Facility: CLINIC | Age: 79
End: 2024-07-23
Payer: MEDICARE

## 2024-07-23 NOTE — TELEPHONE ENCOUNTER
PATIENT AWARE OF RECOMMENDATIONS.        -Duplex Lower Extremity Art / Grafts - Bilateral CAR ---- Message from Gustavo Alanis sent at 7/22/2024  1:24 PM EDT -----  Routine follow-up

## 2024-08-16 DIAGNOSIS — E78.2 MIXED HYPERLIPIDEMIA: ICD-10-CM

## 2024-08-16 DIAGNOSIS — R06.02 SHORTNESS OF BREATH: ICD-10-CM

## 2024-08-16 DIAGNOSIS — I25.119 CORONARY ARTERY DISEASE INVOLVING NATIVE CORONARY ARTERY OF NATIVE HEART WITH ANGINA PECTORIS: ICD-10-CM

## 2024-08-16 DIAGNOSIS — I10 ESSENTIAL HYPERTENSION: ICD-10-CM

## 2024-08-16 NOTE — TELEPHONE ENCOUNTER
"Caller: Cassy Olvera \"Carmen\"    Relationship: Self    Best call back number: [phone]     What is the best time to reach you: ANYTIME     What was the call regarding: CHECKING ON THE STATUS OF THESE REFILLS, STATES SHE WILL BE OUT IN 4 DAYS     Is it okay if the provider responds through MyChart: CALL   " Physical Therapy Treatment    Patient Name:  Yayo Carver   MRN:  [id number]    Recommendations:     Discharge Recommendations: outpatient PT  Discharge Equipment Recommendations: bath bench  Barriers to discharge: None    Assessment:     Yayo Carver is a 57 y.o. female admitted with a medical diagnosis of Osteoarthritis of both knees.  She presents with the following impairments/functional limitations: decreased ROM, impaired endurance, impaired functional mobility (decreased lower extremity function; orthopedic precautions; pain) . presents with  improved overall functional mobility requiring decreased assistance and increased activity tolerance to perform functional mobility.Pt would continue to benefit from skilled PT to address overall functional mobility, to return to functional baseline and goals. Goals remain appropriate.      Rehab Prognosis: Good; patient would benefit from acute skilled PT services to address these deficits and reach maximum level of function.    Recent Surgery: Procedure(s) (LRB):  ARTHROPLASTY, KNEE, TOTAL: LEFT: DEPUY - ATTUNE (Left) 1 Day Post-Op    Plan:     During this hospitalization, patient to be seen daily to address the identified rehab impairments via gait training, therapeutic activities, therapeutic exercises and progress toward the following goals:    Plan of Care Expires:  12/21/22    Subjective     Chief Complaint:  pain and stiffness in L knee    Pain/Comfort:  Pain Rating 1: 4/10  Location - Side 1: Left  Location - Orientation 1: posterior  Location 1: calf (and knee)  Pain Addressed 1: Distraction, Cessation of Activity, Pre-medicate for activity      Objective:     Communicated with RN prior to session.  Patient found up in chair with  (cryotherapy; FCD; peripheral IV) upon PT entry to room.     General Precautions: Standard, fall  Orthopedic Precautions: LLE weight bearing as tolerated  Braces: N/A  Respiratory Status: Room air     Functional Mobility:  Bed  Mobility:     Supine to Sit: stand by assistance  Sit to Supine: minimum assistance  Transfers:   cues for hand placement and foot placement  Sit to Stand:  contact guard assistance with rolling walker  Gait: pt amb with RW SBA x 150 ft x 2 with Patient required cues for position in walker, sequencing, and upright posture to increase independence and safety  Stairs:  Pt ascended/descended 4 stair(s) with No Assistive Device with bilateral handrails with Contact Guard Assistance.   Vcs for sequencing and technique    AM-PAC 6 CLICK MOBILITY  Turning over in bed (including adjusting bedclothes, sheets and blankets)?: 3  Sitting down on and standing up from a chair with arms (e.g., wheelchair, bedside commode, etc.): 3  Moving from lying on back to sitting on the side of the bed?: 3  Moving to and from a bed to a chair (including a wheelchair)?: 3  Need to walk in hospital room?: 3  Climbing 3-5 steps with a railing?: 3  Basic Mobility Total Score: 18       Treatment & Education:  Therapist provided instruction and educated of  patient on progress, safety,d/c,PT POC,   proper body mechanics, energy conservation, and fall prevention strategies during tasks listed above, on the effects of prolonged immobility and the importance of performing OOB activity and exercises to promote healing and reduce recovery time      Patient  facilitated therex X 10 reps  therex per TKR protocol Patient required skilled PTA for instruction of exercises and appropriate cues to perform exercises safely, sequencing and appropriately.   Exercises performed to develop and maintain pt's strength, endurance and flexibility.  Updated white board with appropriate PT mobility information for medical team notification      Call nursing/pct to transfer to chair/use bathroom. Pt stated understanding      Bedside table in front of patient and area set up for function, convenience, and safety. RN aware of patient's mobility needs and status.  Questions/concerns addressed within PTA scope of practice; patient  with no further questions. Time was provided for active listening, discussion of health disposition, and discussion of safe discharge. Pt?verbalized?agreement .    Patient left up in chair with all lines intact, call button in reach, nsg notified, and pharmacy present..    GOALS:   Multidisciplinary Problems       Physical Therapy Goals          Problem: Physical Therapy    Goal Priority Disciplines Outcome Goal Variances Interventions   Physical Therapy Goal     PT, PT/OT Ongoing, Progressing     Description: Goals to be met by: 22     Patient will increase functional independence with mobility by performin. Supine to sit with supervision  2. Sit to stand transfer with Supervision  3. Gait x300 feet with Supervision using Rolling Walker  4. Ascend/Descend 4 steps with Stand by assistance using one hand rail with patient preferred side  5. Lower extremity exercise program x30 reps per handout, with supervision                            Time Tracking:     PT Received On: 22  PT Start Time: 939     PT Stop Time: 1033  PT Total Time (min): 54 min     Billable Minutes: Gait Training 30 and Therapeutic Exercise 24    Treatment Type: Treatment  PT/PTA: PTA     PTA Visit Number: 1     2022

## 2024-08-19 RX ORDER — AMLODIPINE BESYLATE 5 MG/1
5 TABLET ORAL DAILY
Qty: 90 TABLET | Refills: 3 | Status: SHIPPED | OUTPATIENT
Start: 2024-08-19

## 2024-08-19 RX ORDER — ROSUVASTATIN CALCIUM 20 MG/1
20 TABLET, COATED ORAL DAILY
Qty: 90 TABLET | Refills: 3 | Status: SHIPPED | OUTPATIENT
Start: 2024-08-19

## 2024-12-09 ENCOUNTER — OFFICE VISIT (OUTPATIENT)
Dept: CARDIOLOGY | Facility: CLINIC | Age: 79
End: 2024-12-09
Payer: MEDICARE

## 2024-12-09 VITALS
WEIGHT: 159 LBS | SYSTOLIC BLOOD PRESSURE: 128 MMHG | DIASTOLIC BLOOD PRESSURE: 69 MMHG | HEART RATE: 96 BPM | BODY MASS INDEX: 29.26 KG/M2 | HEIGHT: 62 IN

## 2024-12-09 DIAGNOSIS — I49.8 JUNCTIONAL RHYTHM: ICD-10-CM

## 2024-12-09 DIAGNOSIS — R06.02 SHORTNESS OF BREATH: ICD-10-CM

## 2024-12-09 DIAGNOSIS — I25.119 CORONARY ARTERY DISEASE INVOLVING NATIVE CORONARY ARTERY OF NATIVE HEART WITH ANGINA PECTORIS: Primary | ICD-10-CM

## 2024-12-09 DIAGNOSIS — R07.9 CHEST PAIN, UNSPECIFIED TYPE: ICD-10-CM

## 2024-12-09 DIAGNOSIS — R00.2 PALPITATIONS: ICD-10-CM

## 2024-12-09 PROCEDURE — 99214 OFFICE O/P EST MOD 30 MIN: CPT | Performed by: PHYSICIAN ASSISTANT

## 2024-12-09 PROCEDURE — 93000 ELECTROCARDIOGRAM COMPLETE: CPT | Performed by: PHYSICIAN ASSISTANT

## 2024-12-09 PROCEDURE — 3074F SYST BP LT 130 MM HG: CPT | Performed by: PHYSICIAN ASSISTANT

## 2024-12-09 PROCEDURE — 3078F DIAST BP <80 MM HG: CPT | Performed by: PHYSICIAN ASSISTANT

## 2024-12-09 RX ORDER — UBIDECARENONE 100 MG
100 CAPSULE ORAL 2 TIMES DAILY
COMMUNITY

## 2024-12-09 NOTE — PROGRESS NOTES
"Problem list     Subjective   Cassy SARA Olvera is a 79 y.o. female     Chief Complaint   Patient presents with    Brest pain   PROBLEM LIST:      1. CAD.  1.1 Cardiac Cath. \"Failed\" PCI of the RCA. [facility]. Winter 2014- Intraprocedural closure of vessel. ILUMIEN-1 stent mid-LAD, 01/20/2014. [place], [place].  1.2 Nuclear stress test 8-7-18 with lateral wall ischemia  1.3 left heart catheter 11/16/18-2 stents to the right coronary artery, 30-50% ostial narrowing of the circumflex, EF 50-55%, left ventricular end of blood pressure 14  1.4 stress test February 2023 with no evidence of ischemia and preserved LV function  2. Shortness of Breath.  2.1 Echo 8-7-18 with Ef > 65%, mild DD, trace MR, trace TR, pulm. Pressures 30-35 mmHg, no pericardial effusion  2.2 Echo 2/16/2022-EF 60± percent, mild LVH, diastolic dysfunction 1, trivial MR and trivial to mild TR, PA in the high 30s  3. Hyperlipidemia.   4.  Smoking habituation  5.  PVD  5.1 bilateral lower extremity arterial ultrasound 2/4/2020-diffuse arthrosis in both common femoral arteries without obstruction, stimulate mild atherosclerotic involvement of the superficial femoral arteries without stenosis, popliteal arteries are widely patent without aneurysmal dilation, distal vessels are patent, nonobstructive disease  6.  COPD, Dr. Goodrich  7.  Carotid artery disease with right carotid endarterectomy   7.2 bilateral carotid endarterectomies in 2022 by Dr. Finn  7 point 3 repeat carotid duplex May 2023 with no obstruction noted.    HPI    Patient is a 79-year-old female presenting to the office for evaluation.  She presents back having issues in regards to left sided chest discomfort.  She notices this near the precordial area but also notices this involving her left breast.  She has an atypical discomfort involving her left breast that when she puts pressure on her breast, it actually improves her symptoms.    She also has some discomfort in that area as well.  " She is chronically dyspneic  and has underlying lung disease.  Dyspnea can be significant at times.  She does not describe PND or orthopnea.    Patient  may occasionally palpitate.  She does not describe associated dizziness, presyncope, or syncope.  She does not describe any strokelike symptoms.  Overall, she is stable.  Current Outpatient Medications on File Prior to Visit   Medication Sig Dispense Refill    amLODIPine (NORVASC) 5 MG tablet TAKE 1 TABLET BY MOUTH DAILY 90 tablet 3    aspirin 81 MG tablet Take 1 tablet by mouth Daily. 90 tablet 3    coenzyme Q10 100 MG capsule Take 1 capsule by mouth 2 (Two) Times a Day.      furosemide (LASIX) 20 MG tablet Take 1 tablet by mouth Daily. 90 tablet 3    isosorbide mononitrate (IMDUR) 30 MG 24 hr tablet TAKE 1 TABLET BY MOUTH EVERY MORNING 90 tablet 3    lisinopril (PRINIVIL,ZESTRIL) 40 MG tablet TAKE 1 TABLET BY MOUTH DAILY 90 tablet 3    nitroglycerin (NITROSTAT) 0.4 MG SL tablet Place 1 tablet under the tongue Every 5 (Five) Minutes As Needed for Chest Pain. Take no more than 3 doses in 15 minutes. 25 tablet 6    rosuvastatin (CRESTOR) 20 MG tablet TAKE 1 TABLET BY MOUTH DAILY 90 tablet 3     No current facility-administered medications on file prior to visit.       Patient has no known allergies.    Past Medical History:   Diagnosis Date    Carotid artery disease     COPD (chronic obstructive pulmonary disease)     Coronary artery disease     COVID-19 vaccine administered     02/26/2021 , 03/26/2021 . 11/04/2021 - Moderna    Dizziness 01/14/2022    Hyperlipidemia     Hypertension     On home oxygen therapy     uses PRN and at HS       Social History     Socioeconomic History    Marital status:     Number of children: 2   Tobacco Use    Smoking status: Every Day     Current packs/day: 1.00     Average packs/day: 1 pack/day for 50.0 years (50.0 ttl pk-yrs)     Types: Cigarettes    Smokeless tobacco: Never    Tobacco comments:     Pt states that she is trying  "quit she has cut down her smoking to 1/2 ppd 8/15/22   Vaping Use    Vaping status: Never Used   Substance and Sexual Activity    Alcohol use: No    Drug use: No    Sexual activity: Defer       Family History   Problem Relation Age of Onset    Hypertension Mother     Heart disease Mother     Diabetes Mother     Heart attack Father        Review of Systems   Constitutional:  Negative for activity change, appetite change, chills, fatigue and fever.   HENT: Negative.  Negative for congestion, sinus pressure and sinus pain.    Eyes: Negative.  Negative for visual disturbance.   Respiratory:  Positive for cough, chest tightness, shortness of breath and wheezing. Negative for apnea.    Cardiovascular:  Positive for chest pain and palpitations. Negative for leg swelling.   Gastrointestinal: Negative.  Negative for blood in stool.   Endocrine: Negative.  Negative for cold intolerance and heat intolerance.   Genitourinary: Negative.  Negative for hematuria.   Musculoskeletal: Negative.  Negative for gait problem.   Skin: Negative.  Negative for color change, rash and wound.   Allergic/Immunologic: Negative.  Negative for environmental allergies and food allergies.   Neurological: Negative.  Positive for light-headedness. Negative for dizziness, tremors, speech difficulty, weakness, numbness and headaches.   Hematological:  Negative for adenopathy. Does not bruise/bleed easily.   Psychiatric/Behavioral: Negative.  Negative for sleep disturbance.        Objective   Vitals:    12/09/24 1324   BP: 128/69   BP Location: Right arm   Patient Position: Sitting   Cuff Size: Adult   Pulse: 96   Weight: 72.1 kg (159 lb)   Height: 157.5 cm (62\")      /69 (BP Location: Right arm, Patient Position: Sitting, Cuff Size: Adult)   Pulse 96   Ht 157.5 cm (62\")   Wt 72.1 kg (159 lb)   BMI 29.08 kg/m²     Lab Results (most recent)       None            Physical Exam  Vitals and nursing note reviewed.   Constitutional:       General: " She is not in acute distress.     Appearance: Normal appearance. She is well-developed.   HENT:      Head: Normocephalic and atraumatic.   Eyes:      General: No scleral icterus.        Right eye: No discharge.         Left eye: No discharge.      Conjunctiva/sclera: Conjunctivae normal.   Neck:      Vascular: No carotid bruit.   Cardiovascular:      Rate and Rhythm: Normal rate and regular rhythm.      Heart sounds: Normal heart sounds. No murmur heard.     No friction rub. No gallop.   Pulmonary:      Effort: Pulmonary effort is normal. No respiratory distress.      Breath sounds: Normal breath sounds. No wheezing or rales.   Chest:      Chest wall: No tenderness.   Musculoskeletal:      Right lower leg: No edema.      Left lower leg: No edema.   Skin:     General: Skin is warm and dry.      Coloration: Skin is not pale.      Findings: No erythema or rash.   Neurological:      Mental Status: She is alert and oriented to person, place, and time.      Cranial Nerves: No cranial nerve deficit.   Psychiatric:         Behavior: Behavior normal.         Procedure     ECG 12 Lead    Date/Time: 12/9/2024 1:29 PM  Performed by: Gustavo Alanis PA    Authorized by: Gustavo Alanis PA  Comparison: compared with previous ECG from 5/17/2022  Comparison to previous ECG: EKG demonstrates sinus rhythm that converted to a hide junctional rhythm at 85 bpm, right bundle branch block and no acute ST changes             Assessment & Plan     Problems Addressed this Visit          Cardiac and Vasculature    CAD prior RCA stents x2 - Primary (Chronic)    Relevant Orders    Adult Transthoracic Echo Complete W/ Cont if Necessary Per Protocol    Stress Test With Myocardial Perfusion One Day    Cardiac Event Monitor (DEMETRI) or Mobile Cardiac Outpatient Telemetry (MCT)    Chest pain    Relevant Orders    Adult Transthoracic Echo Complete W/ Cont if Necessary Per Protocol    Stress Test With Myocardial Perfusion One Day    Cardiac Event  Monitor (DEMETRI) or Mobile Cardiac Outpatient Telemetry (MCT)    Palpitations    Relevant Orders    Adult Transthoracic Echo Complete W/ Cont if Necessary Per Protocol    Stress Test With Myocardial Perfusion One Day    Cardiac Event Monitor (DEMETRI) or Mobile Cardiac Outpatient Telemetry (MCT)    Junctional rhythm    Relevant Orders    Adult Transthoracic Echo Complete W/ Cont if Necessary Per Protocol    Stress Test With Myocardial Perfusion One Day    Cardiac Event Monitor (DEMETRI) or Mobile Cardiac Outpatient Telemetry (MCT)       Pulmonary and Pneumonias    Shortness of breath    Relevant Orders    Adult Transthoracic Echo Complete W/ Cont if Necessary Per Protocol    Stress Test With Myocardial Perfusion One Day    Cardiac Event Monitor (DEMETRI) or Mobile Cardiac Outpatient Telemetry (MCT)     Diagnoses         Codes Comments    Coronary artery disease involving native coronary artery of native heart with angina pectoris    -  Primary ICD-10-CM: I25.119  ICD-9-CM: 414.01, 413.9     Chest pain, unspecified type     ICD-10-CM: R07.9  ICD-9-CM: 786.50     Shortness of breath     ICD-10-CM: R06.02  ICD-9-CM: 786.05     Palpitations     ICD-10-CM: R00.2  ICD-9-CM: 785.1     Junctional rhythm     ICD-10-CM: I49.8  ICD-9-CM: 427.89           Recommendations  1.  Patient is a 79-year-old female with complaints of chest pain and dyspnea as well as some atypical  chest pain.  I would like to order testing to evaluate.    2.  Nuclear stress test will be ordered for ischemia assessment.    3.  Echo to assess LV systolic and diastolic performance, valvular structures etc.    4.  Abnormal EKG in which patient developed a degree of a junctional rhythm that converted back to sinus.  I would have her to wear a 2-week event monitor to evaluate for any further arrhythmic substrate.    5.  Any chest pain, not resolved with nitroglycerin, I want her to go to the ER.  We will start with the above testing and see her back for follow-up.    6.   We will continue antiplatelet and statin therapy.  We can reassess lipid parameters upon follow-up.  Follow-up with primary as scheduled.             Cassy Queencutt  reports that she has been smoking cigarettes. She has a 50 pack-year smoking history. She has never used smokeless tobacco. I have educated her on the risk of diseases from using tobacco products such as cancer, COPD, and heart disease.     I advised her to quit and she is not willing to quit.    I spent 3  minutes counseling the patient.          Patient brought in medicine list to appointment, it's been reviewed with patient and med list was updated in the chart.        Advance Care Planning   ACP discussion was declined by the patient. Patient does not have an advance directive, declines further assistance.        Electronically signed by:

## 2024-12-23 ENCOUNTER — TELEPHONE (OUTPATIENT)
Dept: CARDIOLOGY | Facility: CLINIC | Age: 79
End: 2024-12-23
Payer: MEDICARE

## 2024-12-23 NOTE — TELEPHONE ENCOUNTER
New onset of Atrial Fibrillation.  Start Eliquis 5 mg bid.Aware of the importance of medication and risk of stroke. She does not have sever anemia or hx of brain bleed.     Per Dean Garcia,PAC  Eliquis 5 mg bid.    THONY TOOK DOWN 30 DAY FREE TRIAL AT [facility]

## 2025-01-15 ENCOUNTER — HOSPITAL ENCOUNTER (OUTPATIENT)
Dept: CARDIOLOGY | Facility: HOSPITAL | Age: 80
Discharge: HOME | End: 2025-01-15
Payer: MEDICARE

## 2025-01-15 DIAGNOSIS — R06.02 SHORTNESS OF BREATH: ICD-10-CM

## 2025-01-15 DIAGNOSIS — I49.8 JUNCTIONAL RHYTHM: ICD-10-CM

## 2025-01-15 DIAGNOSIS — R00.2 PALPITATIONS: ICD-10-CM

## 2025-01-15 DIAGNOSIS — R07.9 CHEST PAIN, UNSPECIFIED TYPE: ICD-10-CM

## 2025-01-15 DIAGNOSIS — I25.119 CORONARY ARTERY DISEASE INVOLVING NATIVE CORONARY ARTERY OF NATIVE HEART WITH ANGINA PECTORIS: ICD-10-CM

## 2025-01-15 LAB
AV MEAN PRESS GRAD SYS DOP V1V2: 4.2 MMHG
AV VMAX SYS DOP: 124.9 CM/SEC
BH CV ECHO MEAS - ACS: 1.46 CM
BH CV ECHO MEAS - AO MAX PG: 6.2 MMHG
BH CV ECHO MEAS - AO ROOT DIAM: 2.7 CM
BH CV ECHO MEAS - AO V2 VTI: 28 CM
BH CV ECHO MEAS - EDV(CUBED): 25.7 ML
BH CV ECHO MEAS - EDV(MOD-SP4): 62.6 ML
BH CV ECHO MEAS - EF(MOD-SP4): 80 %
BH CV ECHO MEAS - ESV(CUBED): 2.3 ML
BH CV ECHO MEAS - ESV(MOD-SP4): 12.5 ML
BH CV ECHO MEAS - FS: 55.3 %
BH CV ECHO MEAS - IVS/LVPW: 0.94 CM
BH CV ECHO MEAS - IVSD: 1.37 CM
BH CV ECHO MEAS - LA DIMENSION: 2.9 CM
BH CV ECHO MEAS - LAT PEAK E' VEL: 9.7 CM/SEC
BH CV ECHO MEAS - LV DIASTOLIC VOL/BSA (35-75): 36.1 CM2
BH CV ECHO MEAS - LV MASS(C)D: 139.1 GRAMS
BH CV ECHO MEAS - LV SYSTOLIC VOL/BSA (12-30): 7.2 CM2
BH CV ECHO MEAS - LVIDD: 3 CM
BH CV ECHO MEAS - LVIDS: 1.32 CM
BH CV ECHO MEAS - LVPWD: 1.45 CM
BH CV ECHO MEAS - MED PEAK E' VEL: 6.3 CM/SEC
BH CV ECHO MEAS - MV A MAX VEL: 122.1 CM/SEC
BH CV ECHO MEAS - MV DEC TIME: 0.42 SEC
BH CV ECHO MEAS - MV E MAX VEL: 66.8 CM/SEC
BH CV ECHO MEAS - MV E/A: 0.55
BH CV ECHO MEAS - RAP SYSTOLE: 10 MMHG
BH CV ECHO MEAS - RVDD: 3.2 CM
BH CV ECHO MEAS - RVSP: 38.6 MMHG
BH CV ECHO MEAS - SV(MOD-SP4): 50.1 ML
BH CV ECHO MEAS - SVI(MOD-SP4): 28.9 ML/M2
BH CV ECHO MEAS - TR MAX PG: 28.6 MMHG
BH CV ECHO MEAS - TR MAX VEL: 267.4 CM/SEC
BH CV ECHO MEASUREMENTS AVERAGE E/E' RATIO: 8.35
LEFT ATRIUM VOLUME INDEX: 8.5 ML/M2
LV EF 3D SEGMENTATION: 63 %

## 2025-01-15 PROCEDURE — 25010000002 REGADENOSON 0.4 MG/5ML SOLUTION: Performed by: INTERNAL MEDICINE

## 2025-01-15 PROCEDURE — 78452 HT MUSCLE IMAGE SPECT MULT: CPT

## 2025-01-15 PROCEDURE — 93306 TTE W/DOPPLER COMPLETE: CPT

## 2025-01-15 PROCEDURE — 34310000005 TECHNETIUM SESTAMIBI: Performed by: INTERNAL MEDICINE

## 2025-01-15 PROCEDURE — A9500 TC99M SESTAMIBI: HCPCS | Performed by: INTERNAL MEDICINE

## 2025-01-15 PROCEDURE — 93017 CV STRESS TEST TRACING ONLY: CPT

## 2025-01-15 RX ORDER — REGADENOSON 0.08 MG/ML
0.4 INJECTION, SOLUTION INTRAVENOUS
Status: COMPLETED | OUTPATIENT
Start: 2025-01-15 | End: 2025-01-15

## 2025-01-15 RX ADMIN — REGADENOSON 0.4 MG: 0.08 INJECTION, SOLUTION INTRAVENOUS at 11:12

## 2025-01-15 RX ADMIN — TECHNETIUM TC 99M SESTAMIBI 1 DOSE: 1 INJECTION INTRAVENOUS at 09:07

## 2025-01-15 RX ADMIN — TECHNETIUM TC 99M SESTAMIBI 1 DOSE: 1 INJECTION INTRAVENOUS at 11:12

## 2025-01-18 LAB
BH CV REST NUCLEAR ISOTOPE DOSE: 10 MCI
BH CV STRESS COMMENTS STAGE 1: NORMAL
BH CV STRESS DOSE REGADENOSON STAGE 1: 0.4
BH CV STRESS DURATION MIN STAGE 1: 0
BH CV STRESS DURATION SEC STAGE 1: 10
BH CV STRESS NUCLEAR ISOTOPE DOSE: 30 MCI
BH CV STRESS PROTOCOL 1: NORMAL
BH CV STRESS RECOVERY BP: NORMAL MMHG
BH CV STRESS RECOVERY HR: 80 BPM
BH CV STRESS STAGE 1: 1
MAXIMAL PREDICTED HEART RATE: 141 BPM
PERCENT MAX PREDICTED HR: 63.83 %
STRESS BASELINE BP: NORMAL MMHG
STRESS BASELINE HR: 78 BPM
STRESS PERCENT HR: 75 %
STRESS POST PEAK BP: NORMAL MMHG
STRESS POST PEAK HR: 90 BPM
STRESS TARGET HR: 120 BPM

## 2025-01-20 ENCOUNTER — TELEPHONE (OUTPATIENT)
Dept: CARDIOLOGY | Facility: CLINIC | Age: 80
End: 2025-01-20
Payer: MEDICARE

## 2025-01-20 NOTE — TELEPHONE ENCOUNTER
STRESS  Pt notified of no acute findings. Provider will discuss results at f/u. Pt reminded of appt date and time.  ----- Message from Gustavo Alanis sent at 1/20/2025 11:07 AM EST -----  Keep follow-up on the 28th

## 2025-01-23 ENCOUNTER — TELEPHONE (OUTPATIENT)
Dept: CARDIOLOGY | Facility: CLINIC | Age: 80
End: 2025-01-23
Payer: MEDICARE

## 2025-01-23 NOTE — TELEPHONE ENCOUNTER
Received cardiac clearance request from DR DAVIN BAHENA stating pt has EXTRACTIONS ALL REMAINING LOWER TEETH AND IMPLANT X4 and is requiring a cardiac clearance. Placed cardiac clearance request in KELIN'S inbox to review and address with provider.

## 2025-01-24 ENCOUNTER — TELEPHONE (OUTPATIENT)
Dept: CARDIOLOGY | Facility: CLINIC | Age: 80
End: 2025-01-24
Payer: MEDICARE

## 2025-01-24 NOTE — TELEPHONE ENCOUNTER
ECHO  Pt notified of no acute findings. Provider will discuss results at f/u. Pt reminded of appt date and time.  ----- Message from Kitty STAFFORD sent at 1/23/2025  1:38 PM EST -----    ----- Message -----  From: Gustavo Alanis PA  Sent: 1/23/2025   1:21 PM EST  To: Kitty Carolina MA    Routine follow up

## 2025-01-28 ENCOUNTER — OFFICE VISIT (OUTPATIENT)
Dept: CARDIOLOGY | Facility: CLINIC | Age: 80
End: 2025-01-28
Payer: MEDICARE

## 2025-01-28 VITALS
HEIGHT: 62 IN | WEIGHT: 169 LBS | HEART RATE: 83 BPM | SYSTOLIC BLOOD PRESSURE: 125 MMHG | BODY MASS INDEX: 31.1 KG/M2 | DIASTOLIC BLOOD PRESSURE: 72 MMHG

## 2025-01-28 DIAGNOSIS — R07.9 CHEST PAIN, UNSPECIFIED TYPE: ICD-10-CM

## 2025-01-28 DIAGNOSIS — I48.0 PAROXYSMAL ATRIAL FIBRILLATION: ICD-10-CM

## 2025-01-28 DIAGNOSIS — I10 ESSENTIAL HYPERTENSION: ICD-10-CM

## 2025-01-28 DIAGNOSIS — I25.119 CORONARY ARTERY DISEASE INVOLVING NATIVE CORONARY ARTERY OF NATIVE HEART WITH ANGINA PECTORIS: Primary | ICD-10-CM

## 2025-01-28 PROCEDURE — 3074F SYST BP LT 130 MM HG: CPT | Performed by: PHYSICIAN ASSISTANT

## 2025-01-28 PROCEDURE — 3078F DIAST BP <80 MM HG: CPT | Performed by: PHYSICIAN ASSISTANT

## 2025-01-28 PROCEDURE — 99214 OFFICE O/P EST MOD 30 MIN: CPT | Performed by: PHYSICIAN ASSISTANT

## 2025-01-28 NOTE — PROGRESS NOTES
"Problem list     Subjective   Cassy SARA Olvera is a 79 y.o. female     Chief Complaint   Patient presents with    Testing follow up     CAD     PROBLEM LIST:      1. CAD.  1.1 Cardiac Cath. \"Failed\" PCI of the RCA. [facility]. Winter 2014- Intraprocedural closure of vessel. ILUMIEN-1 stent mid-LAD, 01/20/2014. [place], [place].  1.2 Nuclear stress test 8-7-18 with lateral wall ischemia  1.3 left heart catheter 11/16/18-2 stents to the right coronary artery, 30-50% ostial narrowing of the circumflex, EF 50-55%, left ventricular end of blood pressure 14  1.4 stress test February 2023 with no evidence of ischemia and preserved LV function  1.5 repeat stress test January 2025 with no evidence of ischemia preserved LV function  2.  Atrial fibrillation  2.1 diagnosed by cardiac monitor January 2025 with paroxysmal atrial fibrillation noted.  2.2 patient currently on anticoagulation with Eliquis  3. Hyperlipidemia.   4.  Tobacco habituation  5.  PVD  5.1 bilateral lower extremity arterial ultrasound 2/4/2020-diffuse arthrosis in both common femoral arteries without obstruction, stimulate mild atherosclerotic involvement of the superficial femoral arteries without stenosis, popliteal arteries are widely patent without aneurysmal dilation, distal vessels are patent, nonobstructive disease  6.  COPD, Dr. Goodrich  7.  Carotid artery disease with right carotid endarterectomy   7.2 bilateral carotid endarterectomies in 2022 by Dr. Finn  7.3 repeat carotid duplex May 2023 with no obstruction noted.    HPI    Patient is a 79-year-old female presenting back to the office to be assessed.    Patient has done well since her last visit.  She occasionally might have some mild discomfort but nothing that has been severe.  Her dyspnea is chronic.  She has history of lung disease and tobacco use.  She does not describe any severe progression of her dyspnea.  It is mild to moderate at baseline.  She does not describe PND or " orthopnea.    She may occasionally sense palpitations.  She does not describe dizziness, presyncope or syncope.  She is stable otherwise.      Current Outpatient Medications on File Prior to Visit   Medication Sig Dispense Refill    amLODIPine (NORVASC) 5 MG tablet TAKE 1 TABLET BY MOUTH DAILY 90 tablet 3    apixaban (ELIQUIS) 5 MG tablet tablet Take 1 tablet by mouth 2 (Two) Times a Day. 60 tablet 5    aspirin 81 MG tablet Take 1 tablet by mouth Daily. 90 tablet 3    coenzyme Q10 100 MG capsule Take 1 capsule by mouth 2 (Two) Times a Day.      isosorbide mononitrate (IMDUR) 30 MG 24 hr tablet TAKE 1 TABLET BY MOUTH EVERY MORNING 90 tablet 3    lisinopril (PRINIVIL,ZESTRIL) 40 MG tablet TAKE 1 TABLET BY MOUTH DAILY 90 tablet 3    nitroglycerin (NITROSTAT) 0.4 MG SL tablet Place 1 tablet under the tongue Every 5 (Five) Minutes As Needed for Chest Pain. Take no more than 3 doses in 15 minutes. 25 tablet 6    rosuvastatin (CRESTOR) 20 MG tablet TAKE 1 TABLET BY MOUTH DAILY 90 tablet 3    [DISCONTINUED] furosemide (LASIX) 20 MG tablet Take 1 tablet by mouth Daily. 90 tablet 3     No current facility-administered medications on file prior to visit.       Patient has no known allergies.    Past Medical History:   Diagnosis Date    Carotid artery disease     COPD (chronic obstructive pulmonary disease)     Coronary artery disease     COVID-19 vaccine administered     02/26/2021 , 03/26/2021 . 11/04/2021 - Moderna    Dizziness 01/14/2022    Hyperlipidemia     Hypertension     On home oxygen therapy     uses PRN and at HS       Social History     Socioeconomic History    Marital status:     Number of children: 2   Tobacco Use    Smoking status: Every Day     Current packs/day: 1.00     Average packs/day: 1 pack/day for 50.0 years (50.0 ttl pk-yrs)     Types: Cigarettes    Smokeless tobacco: Never    Tobacco comments:     Pt states that she is trying quit she has cut down her smoking to 1/2 ppd 8/15/22   Vaping Use     "Vaping status: Never Used   Substance and Sexual Activity    Alcohol use: No    Drug use: No    Sexual activity: Defer       Family History   Problem Relation Age of Onset    Hypertension Mother     Heart disease Mother     Diabetes Mother     Heart attack Father        Review of Systems   Constitutional:  Negative for activity change, appetite change, chills, fatigue and fever.   HENT:  Negative for congestion, sinus pressure and sinus pain.    Eyes: Negative.  Negative for visual disturbance.   Respiratory:  Positive for shortness of breath and wheezing. Negative for apnea, cough, chest tightness and stridor.    Cardiovascular:  Positive for chest pain. Negative for leg swelling.   Gastrointestinal: Negative.  Negative for blood in stool.   Endocrine: Negative.  Negative for cold intolerance and heat intolerance.   Genitourinary: Negative.  Negative for hematuria.   Musculoskeletal: Negative.  Negative for gait problem.   Skin: Negative.  Negative for color change, rash and wound.   Allergic/Immunologic: Negative.  Negative for environmental allergies and food allergies.   Neurological: Negative.  Negative for dizziness, syncope, weakness, light-headedness, numbness and headaches.   Hematological:  Bruises/bleeds easily.   Psychiatric/Behavioral: Negative.  Negative for sleep disturbance.        Objective   Vitals:    01/28/25 1437   BP: 125/72   BP Location: Right arm   Patient Position: Sitting   Cuff Size: Adult   Pulse: 83   Weight: 76.7 kg (169 lb)   Height: 157.5 cm (62\")      /72 (BP Location: Right arm, Patient Position: Sitting, Cuff Size: Adult)   Pulse 83   Ht 157.5 cm (62\")   Wt 76.7 kg (169 lb)   BMI 30.91 kg/m²     Lab Results (most recent)       None            Physical Exam  Vitals and nursing note reviewed.   Constitutional:       General: She is not in acute distress.     Appearance: Normal appearance. She is well-developed.   HENT:      Head: Normocephalic and atraumatic.   Eyes:      " General: No scleral icterus.        Right eye: No discharge.         Left eye: No discharge.      Conjunctiva/sclera: Conjunctivae normal.   Neck:      Vascular: No JVD.   Cardiovascular:      Rate and Rhythm: Normal rate and regular rhythm.      Heart sounds: Normal heart sounds. No murmur heard.     No friction rub. No gallop.   Pulmonary:      Effort: Pulmonary effort is normal. No respiratory distress.      Breath sounds: Normal breath sounds. No wheezing or rales.   Chest:      Chest wall: No tenderness.   Musculoskeletal:      Right lower leg: No edema.      Left lower leg: No edema.   Skin:     General: Skin is warm and dry.      Coloration: Skin is not pale.      Findings: No erythema or rash.   Neurological:      Mental Status: She is alert and oriented to person, place, and time.      Cranial Nerves: No cranial nerve deficit.   Psychiatric:         Behavior: Behavior normal.         Procedure   Procedures       Assessment & Plan     Problems Addressed this Visit          Cardiac and Vasculature    CAD prior RCA stents x2 - Primary (Chronic)    Essential hypertension    Chest pain    Paroxysmal atrial fibrillation     Diagnoses         Codes Comments    Coronary artery disease involving native coronary artery of native heart with angina pectoris    -  Primary ICD-10-CM: I25.119  ICD-9-CM: 414.01, 413.9     Chest pain, unspecified type     ICD-10-CM: R07.9  ICD-9-CM: 786.50     Essential hypertension     ICD-10-CM: I10  ICD-9-CM: 401.9     Paroxysmal atrial fibrillation     ICD-10-CM: I48.0  ICD-9-CM: 427.31             Recommendations  1.  Patient is a 79-year-old female presenting back to the office for follow-up.  She has known coronary disease with recent stress test showing no evidence of ischemia.  She has an occasional discomfort but nothing that has been progressive.  She has done well at this time on antianginal therapy.  For now, we can monitor the patient and consider further evaluation if  symptoms worsen.    2.  Patient with baseline hypertension doing well on medical therapy.  I will make no changes.    3.  Patient with paroxysmal A-fib.  She currently is stable with only occasional palpitations.  She has no complaints of bleeding on Eliquis.  We can monitor for now.    4.  We will see her back for follow-up in 6 months or sooner if needed.  Follow-up with primary as scheduled.           Cassy Olvera  reports that she has been smoking cigarettes. She has a 50 pack-year smoking history. She has never used smokeless tobacco. I have educated her on the risk of diseases from using tobacco products such as cancer, COPD, and heart disease.     I advised her to quit and she is not willing to quit.    I spent 3  minutes counseling the patient.        Advance Care Planning   ACP discussion was declined by the patient. Patient does not have an advance directive, declines further assistance.        Patient brought in medicine list to appointment, it's been reviewed with patient and med list was updated in the chart.      Electronically signed by:

## 2025-05-09 DIAGNOSIS — I10 ESSENTIAL HYPERTENSION: ICD-10-CM

## 2025-05-12 RX ORDER — LISINOPRIL 40 MG/1
40 TABLET ORAL DAILY
Qty: 90 TABLET | Refills: 3 | Status: SHIPPED | OUTPATIENT
Start: 2025-05-12

## 2025-05-12 RX ORDER — ISOSORBIDE MONONITRATE 30 MG/1
30 TABLET, EXTENDED RELEASE ORAL EVERY MORNING
Qty: 90 TABLET | Refills: 3 | Status: SHIPPED | OUTPATIENT
Start: 2025-05-12

## 2025-06-09 RX ORDER — APIXABAN 5 MG/1
5 TABLET, FILM COATED ORAL 2 TIMES DAILY
Qty: 60 TABLET | Refills: 5 | Status: SHIPPED | OUTPATIENT
Start: 2025-06-09

## 2025-07-29 ENCOUNTER — OFFICE VISIT (OUTPATIENT)
Dept: CARDIOLOGY | Facility: CLINIC | Age: 80
End: 2025-07-29
Payer: MEDICARE

## 2025-07-29 VITALS
OXYGEN SATURATION: 94 % | WEIGHT: 140 LBS | SYSTOLIC BLOOD PRESSURE: 135 MMHG | BODY MASS INDEX: 25.76 KG/M2 | DIASTOLIC BLOOD PRESSURE: 68 MMHG | HEART RATE: 79 BPM | HEIGHT: 62 IN

## 2025-07-29 DIAGNOSIS — I25.119 CORONARY ARTERY DISEASE INVOLVING NATIVE CORONARY ARTERY OF NATIVE HEART WITH ANGINA PECTORIS: Primary | ICD-10-CM

## 2025-07-29 DIAGNOSIS — I10 ESSENTIAL HYPERTENSION: ICD-10-CM

## 2025-07-29 DIAGNOSIS — R07.9 CHEST PAIN, UNSPECIFIED TYPE: ICD-10-CM

## 2025-07-29 DIAGNOSIS — I48.0 PAROXYSMAL ATRIAL FIBRILLATION: ICD-10-CM

## 2025-07-29 PROCEDURE — 3078F DIAST BP <80 MM HG: CPT | Performed by: PHYSICIAN ASSISTANT

## 2025-07-29 PROCEDURE — 99214 OFFICE O/P EST MOD 30 MIN: CPT | Performed by: PHYSICIAN ASSISTANT

## 2025-07-29 PROCEDURE — 3075F SYST BP GE 130 - 139MM HG: CPT | Performed by: PHYSICIAN ASSISTANT

## 2025-07-29 RX ORDER — HYDROCHLOROTHIAZIDE 12.5 MG/1
1 TABLET ORAL DAILY
COMMUNITY
Start: 2025-07-10 | End: 2025-07-29 | Stop reason: SDUPTHER

## 2025-07-29 RX ORDER — HYDROCHLOROTHIAZIDE 12.5 MG/1
12.5 TABLET ORAL DAILY
Qty: 30 TABLET | Refills: 5 | Status: SHIPPED | OUTPATIENT
Start: 2025-07-29

## 2025-07-29 NOTE — PROGRESS NOTES
"Problem list     Subjective   Cassy SARA Olvera is a 79 y.o. female     Chief Complaint   Patient presents with    6 month follow up     CAD   PROBLEM LIST:      1. CAD.  1.1 Cardiac Cath. \"Failed\" PCI of the RCA. [facility]. Winter 2014- Intraprocedural closure of vessel. ILUMIEN-1 stent mid-LAD, 01/20/2014. [place], [place].  1.2 Nuclear stress test 8-7-18 with lateral wall ischemia  1.3 left heart catheter 11/16/18-2 stents to the right coronary artery, 30-50% ostial narrowing of the circumflex, EF 50-55%, left ventricular end of blood pressure 14  1.4 stress test February 2023 with no evidence of ischemia and preserved LV function  1.5 repeat stress test January 2025 with no evidence of ischemia preserved LV function  2.  Atrial fibrillation  2.1 diagnosed by cardiac monitor January 2025 with paroxysmal atrial fibrillation noted.  2.2 patient currently on anticoagulation with Eliquis  3. Hyperlipidemia.   4.  Tobacco habituation  5.  PVD  5.1 bilateral lower extremity arterial ultrasound 2/4/2020-diffuse arthrosis in both common femoral arteries without obstruction, stimulate mild atherosclerotic involvement of the superficial femoral arteries without stenosis, popliteal arteries are widely patent without aneurysmal dilation, distal vessels are patent, nonobstructive disease  6.  COPD, Dr. Goodrich  7.  Carotid artery disease with right carotid endarterectomy   7.2 bilateral carotid endarterectomies in 2022 by Dr. Finn  7.3 repeat carotid duplex May 2023 with no obstruction noted.       HPI    Patient is a 79-year-old female presenting to the office for evaluation.  Patient is doing remarkably well.  She has chronic chest pain that is mild.  She does not describe any progressive pain at all.  She will feel an occasional discomfort but it is mild.  She does not describe taking nitroglycerin.  She has a degree of dyspnea that is mild to moderate at baseline.  It has not been progressive per her report.  Her " dyspnea apparently has been chronic.  She does not describe any PND or orthopnea.    She does not palpitate nor does she complain of dysrhythmic symptoms.  Overall, patient has done remarkably well.  Her blood pressure has been controlled.  She has a degree of mild lower extremity edema but has been stable on medication.  She is doing well at this time.      Current Outpatient Medications on File Prior to Visit   Medication Sig Dispense Refill    amLODIPine (NORVASC) 5 MG tablet TAKE 1 TABLET BY MOUTH DAILY 90 tablet 3    aspirin 81 MG tablet Take 1 tablet by mouth Daily. 90 tablet 3    coenzyme Q10 100 MG capsule Take 1 capsule by mouth 2 (Two) Times a Day.      Eliquis 5 MG tablet tablet TAKE 1 TABLET BY MOUTH TWICE DAILY 60 tablet 5    isosorbide mononitrate (IMDUR) 30 MG 24 hr tablet TAKE 1 TABLET BY MOUTH EVERY MORNING 90 tablet 3    lisinopril (PRINIVIL,ZESTRIL) 40 MG tablet TAKE 1 TABLET BY MOUTH DAILY 90 tablet 3    nitroglycerin (NITROSTAT) 0.4 MG SL tablet Place 1 tablet under the tongue Every 5 (Five) Minutes As Needed for Chest Pain. Take no more than 3 doses in 15 minutes. 25 tablet 6    rosuvastatin (CRESTOR) 20 MG tablet TAKE 1 TABLET BY MOUTH DAILY 90 tablet 3    [DISCONTINUED] hydroCHLOROthiazide 12.5 MG tablet Take 1 tablet by mouth Daily.       No current facility-administered medications on file prior to visit.       Patient has no known allergies.    Past Medical History:   Diagnosis Date    Carotid artery disease     COPD (chronic obstructive pulmonary disease)     Coronary artery disease     COVID-19 vaccine administered     02/26/2021 , 03/26/2021 . 11/04/2021 - Moderna    Dizziness 01/14/2022    Hyperlipidemia     Hypertension     On home oxygen therapy     uses PRN and at HS       Social History     Socioeconomic History    Marital status:     Number of children: 2   Tobacco Use    Smoking status: Every Day     Current packs/day: 1.00     Average packs/day: 1 pack/day for 50.0 years  "(50.0 ttl pk-yrs)     Types: Cigarettes    Smokeless tobacco: Never    Tobacco comments:     Pt states that she is trying quit she has cut down her smoking to 1/2 ppd 8/15/22   Vaping Use    Vaping status: Never Used   Substance and Sexual Activity    Alcohol use: No    Drug use: No    Sexual activity: Defer       Family History   Problem Relation Age of Onset    Hypertension Mother     Heart disease Mother     Diabetes Mother     Heart attack Father        Review of Systems   Constitutional:  Negative for activity change, appetite change, chills, fatigue and fever.   Eyes: Negative.  Negative for visual disturbance.   Respiratory:  Positive for shortness of breath. Negative for apnea, cough, chest tightness and wheezing.    Cardiovascular:  Positive for chest pain (ongoing). Negative for palpitations and leg swelling.   Gastrointestinal: Negative.  Negative for blood in stool.   Endocrine: Negative for cold intolerance and heat intolerance.   Genitourinary: Negative.  Negative for hematuria.   Musculoskeletal: Negative.  Negative for gait problem.   Skin: Negative.  Negative for color change, rash and wound.   Allergic/Immunologic: Negative.  Negative for environmental allergies and food allergies.   Neurological: Negative.  Negative for dizziness, syncope, weakness, light-headedness, numbness and headaches.   Hematological:  Bruises/bleeds easily.   Psychiatric/Behavioral: Negative.  Negative for sleep disturbance.        Objective   Vitals:    07/29/25 1411   BP: 135/68   BP Location: Left arm   Patient Position: Sitting   Cuff Size: Adult   Pulse: 79   SpO2: 94%   Weight: 63.5 kg (140 lb)   Height: 157.5 cm (62\")      /68 (BP Location: Left arm, Patient Position: Sitting, Cuff Size: Adult)   Pulse 79   Ht 157.5 cm (62\")   Wt 63.5 kg (140 lb)   SpO2 94%   BMI 25.61 kg/m²     Lab Results (most recent)       None            Physical Exam  Vitals and nursing note reviewed.   Constitutional:       General: " She is not in acute distress.     Appearance: Normal appearance. She is well-developed.   HENT:      Head: Normocephalic and atraumatic.   Eyes:      General: No scleral icterus.        Right eye: No discharge.         Left eye: No discharge.      Conjunctiva/sclera: Conjunctivae normal.   Neck:      Vascular: No carotid bruit or JVD.   Cardiovascular:      Rate and Rhythm: Normal rate and regular rhythm.      Heart sounds: Normal heart sounds. No murmur heard.     No friction rub. No gallop.   Pulmonary:      Effort: Pulmonary effort is normal. No respiratory distress.      Breath sounds: Normal breath sounds. No wheezing or rales.   Chest:      Chest wall: No tenderness.   Musculoskeletal:      Right lower leg: No edema.      Left lower leg: No edema.   Skin:     General: Skin is warm and dry.      Coloration: Skin is not pale.      Findings: No erythema or rash.   Neurological:      Mental Status: She is alert and oriented to person, place, and time.      Cranial Nerves: No cranial nerve deficit.   Psychiatric:         Behavior: Behavior normal.         Procedure   Procedures       Assessment & Plan     Problems Addressed this Visit          Cardiac and Vasculature    CAD prior RCA stents x2 - Primary (Chronic)    Relevant Medications    hydroCHLOROthiazide 12.5 MG tablet    Essential hypertension    Relevant Medications    hydroCHLOROthiazide 12.5 MG tablet    Chest pain    Relevant Medications    hydroCHLOROthiazide 12.5 MG tablet    Paroxysmal atrial fibrillation     Diagnoses         Codes Comments      Coronary artery disease involving native coronary artery of native heart with angina pectoris    -  Primary ICD-10-CM: I25.119  ICD-9-CM: 414.01, 413.9       Paroxysmal atrial fibrillation     ICD-10-CM: I48.0  ICD-9-CM: 427.31       Essential hypertension     ICD-10-CM: I10  ICD-9-CM: 401.9       Chest pain, unspecified type     ICD-10-CM: R07.9  ICD-9-CM: 786.50             Recommendations  1.  Patient is a  79-year-old female presenting for evaluation with known coronary disease.  She has recent stress test being negative for ischemia.  Chest pain is mild and stable.  No progression while she taken nitroglycerin.  For now, we can monitor.    2.  Patient with paroxysmal A-fib doing well at this time with no complaints of bleeding on Eliquis.  We can monitor for now.    3.  Blood pressure is controlled at this time.  I will make no changes.    4.  Patient's symptoms are stable.  We will monitor and see her back for follow-up in 6 months.  I do recommend smoking cessation with her known coronary disease.  Also recommended LDL goal less than 70 and she is on 20 mg of Crestor.  Patient's cholesterol was well-controlled by last lipids that we have were reviewed.    5.  We will see patient back for follow-up in 6 months or sooner for symptoms as discussed.  Follow-up with primary as scheduled.           Cassy Olvera  reports that she has been smoking cigarettes. She has a 50 pack-year smoking history. She has never used smokeless tobacco. I have educated her on the risk of diseases from using tobacco products such as cancer, COPD, and heart disease.     I advised her to quit and she is not willing to quit.    I spent 3  minutes counseling the patient.       Patient did not bring med list or medicine bottles to appointment, med list has been reviewed and updated based on patient's knowledge of their meds.      Electronically signed by:

## 2025-08-07 DIAGNOSIS — I25.119 CORONARY ARTERY DISEASE INVOLVING NATIVE CORONARY ARTERY OF NATIVE HEART WITH ANGINA PECTORIS: ICD-10-CM

## 2025-08-07 DIAGNOSIS — R06.02 SHORTNESS OF BREATH: ICD-10-CM

## 2025-08-07 DIAGNOSIS — E78.2 MIXED HYPERLIPIDEMIA: ICD-10-CM

## 2025-08-07 RX ORDER — ROSUVASTATIN CALCIUM 20 MG/1
20 TABLET, COATED ORAL DAILY
Qty: 90 TABLET | Refills: 3 | Status: SHIPPED | OUTPATIENT
Start: 2025-08-07

## 2025-08-08 DIAGNOSIS — E78.2 MIXED HYPERLIPIDEMIA: ICD-10-CM

## 2025-08-08 DIAGNOSIS — I10 ESSENTIAL HYPERTENSION: ICD-10-CM

## 2025-08-08 DIAGNOSIS — I25.119 CORONARY ARTERY DISEASE INVOLVING NATIVE CORONARY ARTERY OF NATIVE HEART WITH ANGINA PECTORIS: ICD-10-CM

## 2025-08-08 DIAGNOSIS — R06.02 SHORTNESS OF BREATH: ICD-10-CM

## 2025-08-11 RX ORDER — AMLODIPINE BESYLATE 5 MG/1
5 TABLET ORAL DAILY
Qty: 90 TABLET | Refills: 3 | Status: SHIPPED | OUTPATIENT
Start: 2025-08-11

## (undated) DEVICE — SUT SILK 4/0 TIES 18IN A183H

## (undated) DEVICE — STERILE PVP: Brand: MEDLINE INDUSTRIES, INC.

## (undated) DEVICE — 3M™ IOBAN™ 2 ANTIMICROBIAL INCISE DRAPE 6650EZ: Brand: IOBAN™ 2

## (undated) DEVICE — ELECTRD BLD EZ CLN STD 2.5IN

## (undated) DEVICE — SUT SILK 3/0 TIES 18IN A184H

## (undated) DEVICE — PATIENT RETURN ELECTRODE, SINGLE-USE, CONTACT QUALITY MONITORING, ADULT, WITH 9FT CORD, FOR PATIENTS WEIGING OVER 33LBS. (15KG): Brand: MEGADYNE

## (undated) DEVICE — SUPPL CALC CITRATE CHEWY BITE CARAMEL

## (undated) DEVICE — NDL HYPO ECLPS SFTY 22G 1 1/2IN

## (undated) DEVICE — SPNG GZ WOVN 4X4IN 12PLY 10/BX STRL

## (undated) DEVICE — GLV SURG BIOGEL LTX PF 7 1/2

## (undated) DEVICE — JACKSON-PRATT 100CC BULB RESERVOIR: Brand: CARDINAL HEALTH

## (undated) DEVICE — SUT SILK 2/0 TIES 18IN A185H

## (undated) DEVICE — Device: Brand: JELCO

## (undated) DEVICE — 1 ML TUBERCULIN SYRINGE REGULAR TIP: Brand: MONOJECT

## (undated) DEVICE — SYR CONTRL LUERLOK 10CC

## (undated) DEVICE — TBG PENCL TELESCP MEGADYNE SMOKE EVAC 10FT

## (undated) DEVICE — PENCL ROCKRSWCH MEGADYNE W/HOLSTR 10FT SS

## (undated) DEVICE — SUT PROLN 7/0 BV1 D/A 24IN 8702H

## (undated) DEVICE — ANTIBACTERIAL UNDYED BRAIDED (POLYGLACTIN 910), SYNTHETIC ABSORBABLE SUTURE: Brand: COATED VICRYL

## (undated) DEVICE — SUT PROLN 6/0 C1 D/A 30IN 8706H

## (undated) DEVICE — SUT PROLN SURGILENE 5.0 24IN BLU 9702H

## (undated) DEVICE — TRAP FLD MINIVAC MEGADYNE 100ML

## (undated) DEVICE — SUCTION CANISTER 2500CC: Brand: DEROYAL

## (undated) DEVICE — SUT SILK 2/0 PS 18IN 1588H

## (undated) DEVICE — 3M™ STERI-DRAPE™ INSTRUMENT POUCH 1018: Brand: STERI-DRAPE™

## (undated) DEVICE — GLV SURG BIOGEL LTX PF 8

## (undated) DEVICE — DRAIN JACKSON PRATT ROUND 15FR: Brand: CARDINAL HEALTH

## (undated) DEVICE — DECANTER BAG 9": Brand: MEDLINE INDUSTRIES, INC.

## (undated) DEVICE — PK VASC 10